# Patient Record
Sex: FEMALE | Race: WHITE | NOT HISPANIC OR LATINO | Employment: OTHER | URBAN - METROPOLITAN AREA
[De-identification: names, ages, dates, MRNs, and addresses within clinical notes are randomized per-mention and may not be internally consistent; named-entity substitution may affect disease eponyms.]

---

## 2017-03-07 ENCOUNTER — TELEPHONE (OUTPATIENT)
Dept: ONCOLOGY | Facility: HOSPITAL | Age: 77
End: 2017-03-07

## 2017-03-07 DIAGNOSIS — Z12.31 ENCOUNTER FOR MAMMOGRAM TO ESTABLISH BASELINE MAMMOGRAM: ICD-10-CM

## 2017-03-07 DIAGNOSIS — Z79.899 ENCOUNTER FOR LONG-TERM (CURRENT) USE OF MEDICATIONS: ICD-10-CM

## 2017-03-07 DIAGNOSIS — Z79.811 USE OF AROMATASE INHIBITORS: ICD-10-CM

## 2017-03-07 DIAGNOSIS — Z51.81 ENCOUNTER FOR THERAPEUTIC DRUG MONITORING: Primary | ICD-10-CM

## 2017-03-07 DIAGNOSIS — Z85.3 HISTORY OF BREAST CANCER: ICD-10-CM

## 2017-03-07 DIAGNOSIS — M85.9 DISORDER OF BONE DENSITY AND STRUCTURE, UNSPECIFIED: ICD-10-CM

## 2017-03-07 NOTE — TELEPHONE ENCOUNTER
----- Message from Yaneth David sent at 3/7/2017 12:10 PM EST -----  Contact: [phone]   Pt needs mammo and bone density ordered .

## 2017-03-07 NOTE — TELEPHONE ENCOUNTER
Pt would like a bone density scan and mammogram. Pt had last mammogram in May of 2016 and last bone density in May of 2015. D/W Dr. Agustin. OK to order both of these. Orders placed and message sent to scheduling.

## 2017-04-11 ENCOUNTER — APPOINTMENT (OUTPATIENT)
Dept: ONCOLOGY | Facility: CLINIC | Age: 77
End: 2017-04-11

## 2017-04-11 ENCOUNTER — APPOINTMENT (OUTPATIENT)
Dept: LAB | Facility: HOSPITAL | Age: 77
End: 2017-04-11

## 2017-05-23 ENCOUNTER — APPOINTMENT (OUTPATIENT)
Dept: WOMENS IMAGING | Facility: HOSPITAL | Age: 77
End: 2017-05-23

## 2017-05-23 PROCEDURE — 77063 BREAST TOMOSYNTHESIS BI: CPT | Performed by: RADIOLOGY

## 2017-05-23 PROCEDURE — 77080 DXA BONE DENSITY AXIAL: CPT | Performed by: RADIOLOGY

## 2017-05-23 PROCEDURE — G0202 SCR MAMMO BI INCL CAD: HCPCS | Performed by: RADIOLOGY

## 2017-05-30 ENCOUNTER — LAB (OUTPATIENT)
Dept: LAB | Facility: HOSPITAL | Age: 77
End: 2017-05-30

## 2017-05-30 ENCOUNTER — OFFICE VISIT (OUTPATIENT)
Dept: ONCOLOGY | Facility: CLINIC | Age: 77
End: 2017-05-30

## 2017-05-30 VITALS
BODY MASS INDEX: 41.25 KG/M2 | RESPIRATION RATE: 16 BRPM | HEIGHT: 65 IN | TEMPERATURE: 98.2 F | HEART RATE: 73 BPM | SYSTOLIC BLOOD PRESSURE: 178 MMHG | DIASTOLIC BLOOD PRESSURE: 84 MMHG | WEIGHT: 247.6 LBS | OXYGEN SATURATION: 94 %

## 2017-05-30 DIAGNOSIS — C50.112 MALIGNANT NEOPLASM OF CENTRAL PORTION OF LEFT FEMALE BREAST (HCC): Primary | ICD-10-CM

## 2017-05-30 DIAGNOSIS — C50.112 MALIGNANT NEOPLASM OF CENTRAL PORTION OF LEFT FEMALE BREAST (HCC): ICD-10-CM

## 2017-05-30 DIAGNOSIS — I82.502 CHRONIC DEEP VEIN THROMBOSIS (DVT) OF LEFT LOWER EXTREMITY, UNSPECIFIED VEIN (HCC): ICD-10-CM

## 2017-05-30 DIAGNOSIS — I82.502 CHRONIC DEEP VEIN THROMBOSIS (DVT) OF LEFT LOWER EXTREMITY, UNSPECIFIED VEIN (HCC): Primary | ICD-10-CM

## 2017-05-30 LAB
ALBUMIN SERPL-MCNC: 4.2 G/DL (ref 3.5–5.2)
ALBUMIN/GLOB SERPL: 1.3 G/DL (ref 1.1–2.4)
ALP SERPL-CCNC: 107 U/L (ref 38–116)
ALT SERPL W P-5'-P-CCNC: 15 U/L (ref 0–33)
ANION GAP SERPL CALCULATED.3IONS-SCNC: 11.1 MMOL/L
AST SERPL-CCNC: 17 U/L (ref 0–32)
BASOPHILS # BLD AUTO: 0.06 10*3/MM3 (ref 0–0.1)
BASOPHILS NFR BLD AUTO: 0.9 % (ref 0–1.1)
BILIRUB SERPL-MCNC: 0.4 MG/DL (ref 0.1–1.2)
BUN BLD-MCNC: 15 MG/DL (ref 6–20)
BUN/CREAT SERPL: 14.2 (ref 7.3–30)
CALCIUM SPEC-SCNC: 9.8 MG/DL (ref 8.5–10.2)
CHLORIDE SERPL-SCNC: 100 MMOL/L (ref 98–107)
CO2 SERPL-SCNC: 28.9 MMOL/L (ref 22–29)
CREAT BLD-MCNC: 1.06 MG/DL (ref 0.6–1.1)
DEPRECATED RDW RBC AUTO: 47.8 FL (ref 37–49)
EOSINOPHIL # BLD AUTO: 0.11 10*3/MM3 (ref 0–0.36)
EOSINOPHIL NFR BLD AUTO: 1.7 % (ref 1–5)
ERYTHROCYTE [DISTWIDTH] IN BLOOD BY AUTOMATED COUNT: 13.3 % (ref 11.7–14.5)
GFR SERPL CREATININE-BSD FRML MDRD: 51 ML/MIN/1.73
GLOBULIN UR ELPH-MCNC: 3.3 GM/DL (ref 1.8–3.5)
GLUCOSE BLD-MCNC: 102 MG/DL (ref 74–124)
HCT VFR BLD AUTO: 40.6 % (ref 34–45)
HGB BLD-MCNC: 12.9 G/DL (ref 11.5–14.9)
HOLD SPECIMEN: NORMAL
IMM GRANULOCYTES # BLD: 0.01 10*3/MM3 (ref 0–0.03)
IMM GRANULOCYTES NFR BLD: 0.2 % (ref 0–0.5)
LYMPHOCYTES # BLD AUTO: 1.98 10*3/MM3 (ref 1–3.5)
LYMPHOCYTES NFR BLD AUTO: 31 % (ref 20–49)
MCH RBC QN AUTO: 30.7 PG (ref 27–33)
MCHC RBC AUTO-ENTMCNC: 31.8 G/DL (ref 32–35)
MCV RBC AUTO: 96.7 FL (ref 83–97)
MONOCYTES # BLD AUTO: 0.61 10*3/MM3 (ref 0.25–0.8)
MONOCYTES NFR BLD AUTO: 9.6 % (ref 4–12)
NEUTROPHILS # BLD AUTO: 3.61 10*3/MM3 (ref 1.5–7)
NEUTROPHILS NFR BLD AUTO: 56.6 % (ref 39–75)
NRBC BLD MANUAL-RTO: 0 /100 WBC (ref 0–0)
PLATELET # BLD AUTO: 290 10*3/MM3 (ref 150–375)
PMV BLD AUTO: 9.5 FL (ref 8.9–12.1)
POTASSIUM BLD-SCNC: 4.7 MMOL/L (ref 3.5–4.7)
PROT SERPL-MCNC: 7.5 G/DL (ref 6.3–8)
RBC # BLD AUTO: 4.2 10*6/MM3 (ref 3.9–5)
SODIUM BLD-SCNC: 140 MMOL/L (ref 134–145)
WBC NRBC COR # BLD: 6.38 10*3/MM3 (ref 4–10)

## 2017-05-30 PROCEDURE — 80053 COMPREHEN METABOLIC PANEL: CPT

## 2017-05-30 PROCEDURE — 99213 OFFICE O/P EST LOW 20 MIN: CPT | Performed by: INTERNAL MEDICINE

## 2017-05-30 PROCEDURE — 85025 COMPLETE CBC W/AUTO DIFF WBC: CPT

## 2017-05-30 PROCEDURE — 36415 COLL VENOUS BLD VENIPUNCTURE: CPT

## 2017-07-18 RX ORDER — ANASTROZOLE 1 MG/1
TABLET ORAL
Qty: 90 TABLET | Refills: 0 | Status: SHIPPED | OUTPATIENT
Start: 2017-07-18 | End: 2018-01-05 | Stop reason: SDUPTHER

## 2017-10-16 RX ORDER — ANASTROZOLE 1 MG/1
TABLET ORAL
Qty: 90 TABLET | Refills: 0 | Status: SHIPPED | OUTPATIENT
Start: 2017-10-16 | End: 2018-01-05 | Stop reason: SDUPTHER

## 2018-01-05 ENCOUNTER — OFFICE VISIT (OUTPATIENT)
Dept: ONCOLOGY | Facility: CLINIC | Age: 78
End: 2018-01-05

## 2018-01-05 ENCOUNTER — LAB (OUTPATIENT)
Dept: LAB | Facility: HOSPITAL | Age: 78
End: 2018-01-05

## 2018-01-05 VITALS
OXYGEN SATURATION: 96 % | RESPIRATION RATE: 16 BRPM | SYSTOLIC BLOOD PRESSURE: 140 MMHG | HEIGHT: 66 IN | BODY MASS INDEX: 39.66 KG/M2 | HEART RATE: 63 BPM | TEMPERATURE: 98.1 F | WEIGHT: 246.8 LBS | DIASTOLIC BLOOD PRESSURE: 78 MMHG

## 2018-01-05 DIAGNOSIS — I82.502 CHRONIC DEEP VEIN THROMBOSIS (DVT) OF LEFT LOWER EXTREMITY, UNSPECIFIED VEIN (HCC): ICD-10-CM

## 2018-01-05 DIAGNOSIS — C50.112 MALIGNANT NEOPLASM OF CENTRAL PORTION OF LEFT FEMALE BREAST (HCC): ICD-10-CM

## 2018-01-05 DIAGNOSIS — C50.112 MALIGNANT NEOPLASM OF CENTRAL PORTION OF LEFT BREAST IN FEMALE, ESTROGEN RECEPTOR POSITIVE (HCC): Primary | ICD-10-CM

## 2018-01-05 DIAGNOSIS — Z17.0 MALIGNANT NEOPLASM OF CENTRAL PORTION OF LEFT BREAST IN FEMALE, ESTROGEN RECEPTOR POSITIVE (HCC): Primary | ICD-10-CM

## 2018-01-05 LAB
ALBUMIN SERPL-MCNC: 4.3 G/DL (ref 3.5–5.2)
ALBUMIN/GLOB SERPL: 1.3 G/DL (ref 1.1–2.4)
ALP SERPL-CCNC: 119 U/L (ref 38–116)
ALT SERPL W P-5'-P-CCNC: 14 U/L (ref 0–33)
ANION GAP SERPL CALCULATED.3IONS-SCNC: 10.4 MMOL/L
AST SERPL-CCNC: 19 U/L (ref 0–32)
BASOPHILS # BLD AUTO: 0.05 10*3/MM3 (ref 0–0.1)
BASOPHILS NFR BLD AUTO: 0.7 % (ref 0–1.1)
BILIRUB SERPL-MCNC: 0.5 MG/DL (ref 0.1–1.2)
BUN BLD-MCNC: 20 MG/DL (ref 6–20)
BUN/CREAT SERPL: 17.9 (ref 7.3–30)
CALCIUM SPEC-SCNC: 9.9 MG/DL (ref 8.5–10.2)
CHLORIDE SERPL-SCNC: 103 MMOL/L (ref 98–107)
CO2 SERPL-SCNC: 28.6 MMOL/L (ref 22–29)
CREAT BLD-MCNC: 1.12 MG/DL (ref 0.6–1.1)
DEPRECATED RDW RBC AUTO: 45.1 FL (ref 37–49)
EOSINOPHIL # BLD AUTO: 0.11 10*3/MM3 (ref 0–0.36)
EOSINOPHIL NFR BLD AUTO: 1.6 % (ref 1–5)
ERYTHROCYTE [DISTWIDTH] IN BLOOD BY AUTOMATED COUNT: 12.8 % (ref 11.7–14.5)
GFR SERPL CREATININE-BSD FRML MDRD: 47 ML/MIN/1.73
GLOBULIN UR ELPH-MCNC: 3.3 GM/DL (ref 1.8–3.5)
GLUCOSE BLD-MCNC: 109 MG/DL (ref 74–124)
HCT VFR BLD AUTO: 41.9 % (ref 34–45)
HGB BLD-MCNC: 13.1 G/DL (ref 11.5–14.9)
IMM GRANULOCYTES # BLD: 0.01 10*3/MM3 (ref 0–0.03)
IMM GRANULOCYTES NFR BLD: 0.1 % (ref 0–0.5)
LYMPHOCYTES # BLD AUTO: 1.72 10*3/MM3 (ref 1–3.5)
LYMPHOCYTES NFR BLD AUTO: 25.1 % (ref 20–49)
MCH RBC QN AUTO: 29.9 PG (ref 27–33)
MCHC RBC AUTO-ENTMCNC: 31.3 G/DL (ref 32–35)
MCV RBC AUTO: 95.7 FL (ref 83–97)
MONOCYTES # BLD AUTO: 0.66 10*3/MM3 (ref 0.25–0.8)
MONOCYTES NFR BLD AUTO: 9.6 % (ref 4–12)
NEUTROPHILS # BLD AUTO: 4.29 10*3/MM3 (ref 1.5–7)
NEUTROPHILS NFR BLD AUTO: 62.9 % (ref 39–75)
NRBC BLD MANUAL-RTO: 0 /100 WBC (ref 0–0)
PLATELET # BLD AUTO: 283 10*3/MM3 (ref 150–375)
PMV BLD AUTO: 9.5 FL (ref 8.9–12.1)
POTASSIUM BLD-SCNC: 5.4 MMOL/L (ref 3.5–4.7)
PROT SERPL-MCNC: 7.6 G/DL (ref 6.3–8)
RBC # BLD AUTO: 4.38 10*6/MM3 (ref 3.9–5)
SODIUM BLD-SCNC: 142 MMOL/L (ref 134–145)
WBC NRBC COR # BLD: 6.84 10*3/MM3 (ref 4–10)

## 2018-01-05 PROCEDURE — 80053 COMPREHEN METABOLIC PANEL: CPT | Performed by: INTERNAL MEDICINE

## 2018-01-05 PROCEDURE — 85025 COMPLETE CBC W/AUTO DIFF WBC: CPT | Performed by: INTERNAL MEDICINE

## 2018-01-05 PROCEDURE — 99213 OFFICE O/P EST LOW 20 MIN: CPT | Performed by: INTERNAL MEDICINE

## 2018-01-05 PROCEDURE — 36415 COLL VENOUS BLD VENIPUNCTURE: CPT | Performed by: INTERNAL MEDICINE

## 2018-01-05 RX ORDER — EZETIMIBE 10 MG/1
10 TABLET ORAL DAILY
COMMUNITY

## 2018-01-05 RX ORDER — ANASTROZOLE 1 MG/1
TABLET ORAL
Qty: 90 TABLET | Refills: 0 | Status: SHIPPED | OUTPATIENT
Start: 2018-01-05 | End: 2018-04-10 | Stop reason: SDUPTHER

## 2018-01-05 NOTE — PROGRESS NOTES
Subjective       REASONS FOR FOLLOWUP:   1. Extensive deep vein thrombosis of the left lower extremity following knee surgery.   2. Progression of clot in spite of Coumadin therapy. The patient is not felt to be a Coumadin failure but may have been subtherapeutic.   3. Lupus inhibitor identified on thrombophilia evaluation.   4. IVC filter placed by Dr. Perry Rosario September 2012.   5. Resolution of lupus anticoagulant 12/12.   6. Persistent active clot in February 2013, switched to Xarelto lifelong.   7. An 8 mm grade 1 lobular carcinoma, on Arimidex for 5 years. Starting 7/2014  8. Firm plaque-like area in the left breast. Mammographically negative in May 2015.     History of Present Illness  patient is 76-year-old lady with a hypercoagulable state on lifelong anticoagulation and a small lobular breast cancer the right for which she has been on Arimidex for 3.5  years.  She is tolerating Arimidex well has no new complaints.  She is continues her Xarelto without any bleeding complications.  Her leg swelling is under control with support hose.  .  Bone density in 3/17 shows some small improvement in the hips with normal density in the spine and there is no reason to switch off the Arimidex for the time being.  Mammogram in March was also unchanged and benign.  She does complain of a lot of aching in her legs and this may be related to the Arimidex but I don't think we will need to do anything although one month treatment holiday may not be unreasonable    PAST MEDICAL HISTORY:   1.Hypertension.   2.Hyperlipidemia.   3.Obesity.   PAST SURGICAL HISTORY:   1. Hysterectomy.   2. Left knee replacement surgery in July 2012 with Dr. Wilfredo Arambula.   HEMATOLOGIC/ONCOLOGIC HISTORY: History from previous dates can be found in the separate document.     SOCIAL HISTORY: The patient is  and lives with her  in [place], [place]. She is not a smoker and does not drink alcohol.   FAMILY HISTORY: No significant family  "history of clotting disorders.     Review of Systems   A comprehensive 14 point review of systems was performed and was negative except as mentioned.    Current Outpatient Prescriptions on File Prior to Visit   Medication Sig Dispense Refill   • amLODIPine (NORVASC) 10 MG tablet Take  by mouth daily.     • anastrozole (ARIMIDEX) 1 MG tablet TAKE 1 TABLET BY MOUTH EVERY DAY 90 tablet 0   • atorvastatin (LIPITOR) 40 MG tablet Take 80 mg by mouth.     • Calcium Carb-Cholecalciferol 600-200 MG-UNIT tablet Take  by mouth daily.     • ferrous sulfate 325 (65 FE) MG EC tablet Take  by mouth.     • metoprolol succinate XL (TOPROL-XL) 100 MG 24 hr tablet Take  by mouth daily.     • rivaroxaban (XARELTO) 20 MG tablet Take  by mouth.     • [DISCONTINUED] anastrozole (ARIMIDEX) 1 MG tablet TAKE 1 TABLET BY MOUTH EVERY DAY 90 tablet 0     No current facility-administered medications on file prior to visit.          ALLERGIES:    Allergies   Allergen Reactions   • No Known Drug Allergy        Objective      Vitals:    01/05/18 1139   BP: 140/78   Pulse: 63   Resp: 16   Temp: 98.1 °F (36.7 °C)   TempSrc: Oral   SpO2: 96%   Weight: 112 kg (246 lb 12.8 oz)   Height: 166.7 cm (65.63\")   PainSc: 0-No pain     Current Status 1/5/2018   ECOG score 0       Physical Exam   Pulmonary/Chest:           GENERAL:  Well-developed, well-nourished in no acute distress.   SKIN:  Warm, dry without rashes, purpura or petechiae.  HEAD:  Normocephalic.  EYES:  Pupils equal, round and reactive to light.  EOMs intact.  Conjunctivae normal.  EARS:  Hearing intact.  NOSE:  Septum midline.  No excoriations or nasal discharge.  MOUTH:  Tongue is well-papillated; no stomatitis or ulcers.  Lips normal.  THROAT:  Oropharynx without lesions or exudates.  NECK:  Supple with good range of motion; no thyromegaly or masses, no JVD.  LYMPHATICS:  No cervical, supraclavicular, axillary or inguinal adenopathy.  CHEST:  Lungs clear to percussion and auscultation. Good " airflow.  BREASTS: Right breast is benign left breast shows a  thickened area above the areola that is probably a calcified hematoma and stable  CARDIAC:  Regular rate and rhythm without murmurs, rubs or gallops. Normal S1,S2.  ABDOMEN:  Soft, nontender with no organomegaly or masses.  EXTREMITIES:  No clubbing, cyanosis or edema.  NEUROLOGICAL:  Cranial Nerves II-XII grossly intact.  No focal neurological deficits.  PSYCHIATRIC:  Normal affect and mood.        RECENT LABS:  Hematology WBC   Date Value Ref Range Status   01/05/2018 6.84 4.00 - 10.00 10*3/mm3 Final     RBC   Date Value Ref Range Status   01/05/2018 4.38 3.90 - 5.00 10*6/mm3 Final     Hemoglobin   Date Value Ref Range Status   01/05/2018 13.1 11.5 - 14.9 g/dL Final     Hematocrit   Date Value Ref Range Status   01/05/2018 41.9 34.0 - 45.0 % Final     MCV   Date Value Ref Range Status   01/05/2018 95.7 83.0 - 97.0 fL Final     MCH   Date Value Ref Range Status   01/05/2018 29.9 27.0 - 33.0 pg Final     MCHC   Date Value Ref Range Status   01/05/2018 31.3 (L) 32.0 - 35.0 g/dL Final     RDW   Date Value Ref Range Status   01/05/2018 12.8 11.7 - 14.5 % Final     RDW-SD   Date Value Ref Range Status   01/05/2018 45.1 37.0 - 49.0 fl Final     MPV   Date Value Ref Range Status   01/05/2018 9.5 8.9 - 12.1 fL Final     Platelets   Date Value Ref Range Status   01/05/2018 283 150 - 375 10*3/mm3 Final     Neutrophil %   Date Value Ref Range Status   01/05/2018 62.9 39.0 - 75.0 % Final     Lymphocyte %   Date Value Ref Range Status   01/05/2018 25.1 20.0 - 49.0 % Final     Monocyte %   Date Value Ref Range Status   01/05/2018 9.6 4.0 - 12.0 % Final     Eosinophil %   Date Value Ref Range Status   01/05/2018 1.6 1.0 - 5.0 % Final     Basophil %   Date Value Ref Range Status   01/05/2018 0.7 0.0 - 1.1 % Final     Immature Grans %   Date Value Ref Range Status   01/05/2018 0.1 0.0 - 0.5 % Final     Neutrophils, Absolute   Date Value Ref Range Status   01/05/2018  4.29 1.50 - 7.00 10*3/mm3 Final     Lymphocytes, Absolute   Date Value Ref Range Status   01/05/2018 1.72 1.00 - 3.50 10*3/mm3 Final     Monocytes, Absolute   Date Value Ref Range Status   01/05/2018 0.66 0.25 - 0.80 10*3/mm3 Final     Eosinophils, Absolute   Date Value Ref Range Status   01/05/2018 0.11 0.00 - 0.36 10*3/mm3 Final     Basophils, Absolute   Date Value Ref Range Status   01/05/2018 0.05 0.00 - 0.10 10*3/mm3 Final     Immature Grans, Absolute   Date Value Ref Range Status   01/05/2018 0.01 0.00 - 0.03 10*3/mm3 Final     nRBC   Date Value Ref Range Status   01/05/2018 0.0 0.0 - 0.0 /100 WBC Final       Bone density essentially stable           Assessment/Plan   1.Extensive deep vein thrombosis of the left lower extremity following knee surgery.   2.Progression of clot in spite of Coumadin therapy. The patient is not felt to be a Coumadin failure but may have been subtherapeutic.   3.Lupus inhibitor identified on thrombophilia evaluation.   4.IVC filter placed by Dr. Perry Rosario September 2012.   5. Resolution of lupus anticoagulant 12/12.   6.Persistent active clot in February 2013, switched to Xarelto lifelong.   7.An 8 mm grade 1 lobular carcinoma, on Arimidex for 5 years. Starting 7/2014                  8.Firm plaque-like area in the left breast. Mammographically negative in May 2015      Plan  Continue Arimidex and Xarelto  Return in 6 months for follow-up with labs  Mammogram is due in March 1/5/2018      CC:

## 2018-04-10 RX ORDER — ANASTROZOLE 1 MG/1
TABLET ORAL
Qty: 90 TABLET | Refills: 0 | Status: SHIPPED | OUTPATIENT
Start: 2018-04-10 | End: 2018-07-09 | Stop reason: SDUPTHER

## 2018-06-18 ENCOUNTER — APPOINTMENT (OUTPATIENT)
Dept: WOMENS IMAGING | Facility: HOSPITAL | Age: 78
End: 2018-06-18

## 2018-06-18 PROCEDURE — 77063 BREAST TOMOSYNTHESIS BI: CPT | Performed by: RADIOLOGY

## 2018-06-18 PROCEDURE — 77067 SCR MAMMO BI INCL CAD: CPT | Performed by: RADIOLOGY

## 2018-07-09 RX ORDER — ANASTROZOLE 1 MG/1
TABLET ORAL
Qty: 90 TABLET | Refills: 0 | Status: SHIPPED | OUTPATIENT
Start: 2018-07-09 | End: 2018-10-05 | Stop reason: SDUPTHER

## 2018-07-19 ENCOUNTER — LAB (OUTPATIENT)
Dept: LAB | Facility: HOSPITAL | Age: 78
End: 2018-07-19

## 2018-07-19 ENCOUNTER — OFFICE VISIT (OUTPATIENT)
Dept: ONCOLOGY | Facility: CLINIC | Age: 78
End: 2018-07-19

## 2018-07-19 VITALS
SYSTOLIC BLOOD PRESSURE: 142 MMHG | RESPIRATION RATE: 16 BRPM | HEART RATE: 68 BPM | DIASTOLIC BLOOD PRESSURE: 80 MMHG | HEIGHT: 65 IN | TEMPERATURE: 98.2 F | WEIGHT: 246.6 LBS | OXYGEN SATURATION: 92 % | BODY MASS INDEX: 41.09 KG/M2

## 2018-07-19 DIAGNOSIS — C50.112 MALIGNANT NEOPLASM OF CENTRAL PORTION OF LEFT BREAST IN FEMALE, ESTROGEN RECEPTOR POSITIVE (HCC): ICD-10-CM

## 2018-07-19 DIAGNOSIS — Z17.0 MALIGNANT NEOPLASM OF CENTRAL PORTION OF LEFT BREAST IN FEMALE, ESTROGEN RECEPTOR POSITIVE (HCC): ICD-10-CM

## 2018-07-19 DIAGNOSIS — I82.502 CHRONIC DEEP VEIN THROMBOSIS (DVT) OF LEFT LOWER EXTREMITY, UNSPECIFIED VEIN (HCC): ICD-10-CM

## 2018-07-19 DIAGNOSIS — I82.502 CHRONIC DEEP VEIN THROMBOSIS (DVT) OF LEFT LOWER EXTREMITY, UNSPECIFIED VEIN (HCC): Primary | ICD-10-CM

## 2018-07-19 LAB
ALBUMIN SERPL-MCNC: 4.2 G/DL (ref 3.5–5.2)
ALBUMIN/GLOB SERPL: 1.4 G/DL (ref 1.1–2.4)
ALP SERPL-CCNC: 96 U/L (ref 38–116)
ALT SERPL W P-5'-P-CCNC: 10 U/L (ref 0–33)
ANION GAP SERPL CALCULATED.3IONS-SCNC: 11.9 MMOL/L
AST SERPL-CCNC: 15 U/L (ref 0–32)
BASOPHILS # BLD AUTO: 0.05 10*3/MM3 (ref 0–0.1)
BASOPHILS NFR BLD AUTO: 0.8 % (ref 0–1.1)
BILIRUB SERPL-MCNC: 0.5 MG/DL (ref 0.1–1.2)
BUN BLD-MCNC: 16 MG/DL (ref 6–20)
BUN/CREAT SERPL: 15.7 (ref 7.3–30)
CALCIUM SPEC-SCNC: 9.7 MG/DL (ref 8.5–10.2)
CHLORIDE SERPL-SCNC: 104 MMOL/L (ref 98–107)
CO2 SERPL-SCNC: 27.1 MMOL/L (ref 22–29)
CREAT BLD-MCNC: 1.02 MG/DL (ref 0.6–1.1)
DEPRECATED RDW RBC AUTO: 48.6 FL (ref 37–49)
EOSINOPHIL # BLD AUTO: 0.12 10*3/MM3 (ref 0–0.36)
EOSINOPHIL NFR BLD AUTO: 1.9 % (ref 1–5)
ERYTHROCYTE [DISTWIDTH] IN BLOOD BY AUTOMATED COUNT: 13.8 % (ref 11.7–14.5)
GFR SERPL CREATININE-BSD FRML MDRD: 53 ML/MIN/1.73
GLOBULIN UR ELPH-MCNC: 3 GM/DL (ref 1.8–3.5)
GLUCOSE BLD-MCNC: 102 MG/DL (ref 74–124)
HCT VFR BLD AUTO: 39.2 % (ref 34–45)
HGB BLD-MCNC: 12.3 G/DL (ref 11.5–14.9)
IMM GRANULOCYTES # BLD: 0.02 10*3/MM3 (ref 0–0.03)
IMM GRANULOCYTES NFR BLD: 0.3 % (ref 0–0.5)
LYMPHOCYTES # BLD AUTO: 1.71 10*3/MM3 (ref 1–3.5)
LYMPHOCYTES NFR BLD AUTO: 27.2 % (ref 20–49)
MCH RBC QN AUTO: 30 PG (ref 27–33)
MCHC RBC AUTO-ENTMCNC: 31.4 G/DL (ref 32–35)
MCV RBC AUTO: 95.6 FL (ref 83–97)
MONOCYTES # BLD AUTO: 0.7 10*3/MM3 (ref 0.25–0.8)
MONOCYTES NFR BLD AUTO: 11.1 % (ref 4–12)
NEUTROPHILS # BLD AUTO: 3.69 10*3/MM3 (ref 1.5–7)
NEUTROPHILS NFR BLD AUTO: 58.7 % (ref 39–75)
NRBC BLD MANUAL-RTO: 0 /100 WBC (ref 0–0)
PLATELET # BLD AUTO: 271 10*3/MM3 (ref 150–375)
PMV BLD AUTO: 9.8 FL (ref 8.9–12.1)
POTASSIUM BLD-SCNC: 4.6 MMOL/L (ref 3.5–4.7)
PROT SERPL-MCNC: 7.2 G/DL (ref 6.3–8)
RBC # BLD AUTO: 4.1 10*6/MM3 (ref 3.9–5)
SODIUM BLD-SCNC: 143 MMOL/L (ref 134–145)
WBC NRBC COR # BLD: 6.29 10*3/MM3 (ref 4–10)

## 2018-07-19 PROCEDURE — 36415 COLL VENOUS BLD VENIPUNCTURE: CPT | Performed by: INTERNAL MEDICINE

## 2018-07-19 PROCEDURE — 85025 COMPLETE CBC W/AUTO DIFF WBC: CPT | Performed by: INTERNAL MEDICINE

## 2018-07-19 PROCEDURE — 99213 OFFICE O/P EST LOW 20 MIN: CPT | Performed by: INTERNAL MEDICINE

## 2018-07-19 PROCEDURE — 80053 COMPREHEN METABOLIC PANEL: CPT | Performed by: INTERNAL MEDICINE

## 2018-07-19 NOTE — PROGRESS NOTES
Subjective       REASONS FOR FOLLOWUP:   1. Extensive deep vein thrombosis of the left lower extremity following knee surgery.   2. Progression of clot in spite of Coumadin therapy. The patient is not felt to be a Coumadin failure but may have been subtherapeutic.   3. Lupus inhibitor identified on thrombophilia evaluation.   4. IVC filter placed by Dr. Perry Rosario September 2012.   5. Resolution of lupus anticoagulant 12/12.   6. Persistent active clot in February 2013, switched to Xarelto lifelong.   7. An 8 mm grade 1 lobular carcinoma, on Arimidex for 5 years. Starting 7/2014  8. Firm plaque-like area in the left breast. Mammographically negative in May 2015.     History of Present Illness  patient is 76-year-old lady with a hypercoagulable state on lifelong anticoagulation and a small lobular breast cancer the right for which she has been on Arimidex for 4  years.  She is tolerating Arimidex well has no new complaints.  She is continues her Xarelto without any bleeding complications.  Her leg swelling is under control with support hose.  .  Bone density in 3/17 shows some small improvement in the hips with normal density in the spine and there is no reason to switch off the Arimidex for the time being.      Mammogram on 6/8/18  was also unchanged and benign.        Active Ambulatory Problems     Diagnosis Date Noted   • Malignant neoplasm of central portion of left female breast (CMS/[facility]) 05/08/2016   • DVT (deep venous thrombosis) (CMS/[facility]) 05/08/2016     Resolved Ambulatory Problems     Diagnosis Date Noted   • No Resolved Ambulatory Problems     Past Medical History:   Diagnosis Date   • Arthritis    • Cancer (CMS/[facility]) 2014   • DVT (deep venous thrombosis) (CMS/[facility]) 2012   • Hyperlipidemia    • Hypertension    • Obesity    PAST SURGICAL HISTORY:   1. Hysterectomy.   2. Left knee replacement surgery in July 2012 with Dr. Wilfredo Arambula.   HEMATOLOGIC/ONCOLOGIC HISTORY:  The patient had undergone left total knee  replacement in July.  She unfortunately developed a DVT in the left lower extremity after discontinuing her Coumadin prophylaxis.  At that point she was started on Lovenox and resumed Coumadin.  When her Lovenox s  h  ots were discontinued, she developed some more pain and swelling in the leg and was found to have had some extension of clot in the left leg and was readmitted to [facility].  She underwent IVC filter placement with Dr. Perry Rosario and was starte  d   on IV heparin initially.  Coumadin was continued and we felt that she was not a definite Coumadin failure but that she may have been subtherapeutic when the clot extended.  Her thrombophilia workup did reveal the presence of a lupus inhibitor and for thi  s reason we elected to increase her INR goal to between 2.5 and 3.5.          She also was anemic in the hospital and has been taking oral iron supplementation with some improvement in her hemoglobin.  Repeat iron studies were drawn today but are pending.  Her IN  R in the office today is slightly too high at 3.6 and she will receive a slight Coumadin adjustment dropping from 27 mg of Coumadin weekly down to 24 mg Coumadin weekly (3 mg 5 days a week and 4.5 mg 2 days a week).  Since leaving the hospital she has bee  n faithfully wearing her stocking and is trying to keep the leg elevated and feels that there has been improvement.        Patient seen 10/5/12 doing well.  Lupus anticoagulant and  to be repeated 12 weeks after her original positive lupus anticoagulant.  Her   IVC filter is temporary but I doubt we can remove it at this point.   is probably elevated because of her history of rheumatoid arthritis.        Repeat testing in 12/12 shows resolution of lupus anticoagulant.   is only 1:40 positive.  Plan to repeat her Doppler and assess and consider stopping at six months and switching to aspirin.        The patient was seen on 3/11/3 clinically stable, but repeat Doppler in  February 2013 showed clot that looks active and therefore the patient was switched to Xarelto.          New breast cancer diagnosed mammographically in May 2014. Bridging by stopping her Xarelto 2 days before surgery, Lovenox 40 mg for 3 to 4 days postop and then resumption of Xarelto.      Plan and review her pathology reports in 3 weeks after surgery.              The patient was seen on 10/21/2014, having started Arimidex just 1 week ago. Tolerance so far is good.            Patient seen on 05/12/2015 with persistent induration involving the skin of the left breast  above and around the areola measuring 4 x 3 cm.  Ultrasound and mammogram ordered for followup.              Patient seen on 08/04/2015 doing well on Arimidex with thickening and induration. The plaque-like area in the left breast is unchanged with a negative mammogram and ultrasound in May.     SOCIAL HISTORY: The patient is  and lives with her  in [place], [place]. She is not a smoker and does not drink alcohol.   FAMILY HISTORY: No significant family history of clotting disorders.     Review of Systems   A comprehensive 14 point review of systems was performed and was negative except as mentioned.    Current Outpatient Prescriptions on File Prior to Visit   Medication Sig Dispense Refill   • amLODIPine (NORVASC) 10 MG tablet Take  by mouth daily.     • anastrozole (ARIMIDEX) 1 MG tablet TAKE 1 TABLET BY MOUTH DAILY 90 tablet 0   • atorvastatin (LIPITOR) 40 MG tablet Take 80 mg by mouth.     • Calcium Carb-Cholecalciferol 600-200 MG-UNIT tablet Take  by mouth daily.     • ezetimibe (ZETIA) 10 MG tablet Take 10 mg by mouth Daily.     • metoprolol succinate XL (TOPROL-XL) 100 MG 24 hr tablet Take  by mouth daily.     • rivaroxaban (XARELTO) 20 MG tablet Take  by mouth.       No current facility-administered medications on file prior to visit.          ALLERGIES:    Allergies   Allergen Reactions   • No Known Drug Allergy        Objective  "     Vitals:    07/19/18 1014   BP: 142/80   Pulse: 68   Resp: 16   Temp: 98.2 °F (36.8 °C)   TempSrc: Oral   SpO2: 92%   Weight: 112 kg (246 lb 9.6 oz)   Height: 165.1 cm (65\")  Comment: new height   PainSc: 0-No pain     Current Status 7/19/2018   ECOG score 0       Physical Exam   Pulmonary/Chest:           GENERAL:  Well-developed, well-nourished in no acute distress.   SKIN:  Warm, dry without rashes, purpura or petechiae.  HEAD:  Normocephalic.  EYES:  Pupils equal, round and reactive to light.  EOMs intact.  Conjunctivae normal.  EARS:  Hearing intact.  NOSE:  Septum midline.  No excoriations or nasal discharge.  MOUTH:  Tongue is well-papillated; no stomatitis or ulcers.  Lips normal.  THROAT:  Oropharynx without lesions or exudates.  NECK:  Supple with good range of motion; no thyromegaly or masses, no JVD.  LYMPHATICS:  No cervical, supraclavicular, axillary or inguinal adenopathy.  CHEST:  Lungs clear to percussion and auscultation. Good airflow.  BREASTS: Right breast is benign left breast shows a  thickened area above the areola that is probably a calcified hematoma and stable  CARDIAC:  Regular rate and rhythm without murmurs, rubs or gallops. Normal S1,S2.  ABDOMEN:  Soft, nontender with no organomegaly or masses.  EXTREMITIES:  No clubbing, cyanosis or edema.  NEUROLOGICAL:  Cranial Nerves II-XII grossly intact.  No focal neurological deficits.  PSYCHIATRIC:  Normal affect and mood.        RECENT LABS:  Hematology Lab Results   Component Value Date    WBC 6.29 07/19/2018    HGB 12.3 07/19/2018    HCT 39.2 07/19/2018    MCV 95.6 07/19/2018     07/19/2018         Bone density essentially stable           Assessment/Plan   1.Extensive deep vein thrombosis of the left lower extremity following knee surgery.   2.Progression of clot in spite of Coumadin therapy. The patient is not felt to be a Coumadin failure but may have been subtherapeutic.   3.Lupus inhibitor identified on thrombophilia " evaluation.   4.IVC filter placed by Dr. Perry Rosario September 2012.   5. Resolution of lupus anticoagulant 12/12.   6.Persistent active clot in February 2013, switched to Xarelto lifelong.   7.An 8 mm grade 1 lobular carcinoma, on Arimidex for 5 years. Starting 7/2014                  8.Firm plaque-like area in the left breast. Mammographically negative in May 2015      Plan  Continue Arimidex and Xarelto  Return in 6 months for follow-up with labs                7/19/2018      CC:

## 2018-10-05 RX ORDER — ANASTROZOLE 1 MG/1
TABLET ORAL
Qty: 90 TABLET | Refills: 0 | Status: SHIPPED | OUTPATIENT
Start: 2018-10-05 | End: 2019-01-11 | Stop reason: SDUPTHER

## 2019-01-14 RX ORDER — ANASTROZOLE 1 MG/1
TABLET ORAL
Qty: 90 TABLET | Refills: 0 | Status: SHIPPED | OUTPATIENT
Start: 2019-01-14 | End: 2019-04-10 | Stop reason: SDUPTHER

## 2019-01-18 ENCOUNTER — LAB (OUTPATIENT)
Dept: LAB | Facility: HOSPITAL | Age: 79
End: 2019-01-18

## 2019-01-18 ENCOUNTER — OFFICE VISIT (OUTPATIENT)
Dept: ONCOLOGY | Facility: CLINIC | Age: 79
End: 2019-01-18

## 2019-01-18 VITALS
HEART RATE: 66 BPM | BODY MASS INDEX: 40.57 KG/M2 | TEMPERATURE: 97.8 F | OXYGEN SATURATION: 95 % | WEIGHT: 243.5 LBS | HEIGHT: 65 IN | SYSTOLIC BLOOD PRESSURE: 146 MMHG | DIASTOLIC BLOOD PRESSURE: 83 MMHG | RESPIRATION RATE: 16 BRPM

## 2019-01-18 DIAGNOSIS — Z79.811 USE OF ANASTROZOLE (ARIMIDEX): ICD-10-CM

## 2019-01-18 DIAGNOSIS — C50.112 MALIGNANT NEOPLASM OF CENTRAL PORTION OF LEFT BREAST IN FEMALE, ESTROGEN RECEPTOR POSITIVE (HCC): ICD-10-CM

## 2019-01-18 DIAGNOSIS — Z17.0 MALIGNANT NEOPLASM OF CENTRAL PORTION OF LEFT BREAST IN FEMALE, ESTROGEN RECEPTOR POSITIVE (HCC): ICD-10-CM

## 2019-01-18 DIAGNOSIS — I82.502 CHRONIC DEEP VEIN THROMBOSIS (DVT) OF LEFT LOWER EXTREMITY, UNSPECIFIED VEIN (HCC): ICD-10-CM

## 2019-01-18 DIAGNOSIS — Z79.899 ENCOUNTER FOR LONG-TERM (CURRENT) USE OF HIGH-RISK MEDICATION: ICD-10-CM

## 2019-01-18 DIAGNOSIS — C50.112 MALIGNANT NEOPLASM OF CENTRAL PORTION OF LEFT BREAST IN FEMALE, ESTROGEN RECEPTOR POSITIVE (HCC): Primary | ICD-10-CM

## 2019-01-18 DIAGNOSIS — Z09 ENCOUNTER FOR FOLLOW-UP EXAMINATION AFTER COMPLETED TREATMENT FOR CONDITIONS OTHER THAN MALIGNANT NEOPLASM: ICD-10-CM

## 2019-01-18 DIAGNOSIS — Z17.0 MALIGNANT NEOPLASM OF CENTRAL PORTION OF LEFT BREAST IN FEMALE, ESTROGEN RECEPTOR POSITIVE (HCC): Primary | ICD-10-CM

## 2019-01-18 LAB
ALBUMIN SERPL-MCNC: 4.2 G/DL (ref 3.5–5.2)
ALBUMIN/GLOB SERPL: 1.4 G/DL (ref 1.1–2.4)
ALP SERPL-CCNC: 104 U/L (ref 38–116)
ALT SERPL W P-5'-P-CCNC: 14 U/L (ref 0–33)
ANION GAP SERPL CALCULATED.3IONS-SCNC: 13.4 MMOL/L
AST SERPL-CCNC: 17 U/L (ref 0–32)
BASOPHILS # BLD AUTO: 0.04 10*3/MM3 (ref 0–0.1)
BASOPHILS NFR BLD AUTO: 0.6 % (ref 0–1.1)
BILIRUB SERPL-MCNC: 0.4 MG/DL (ref 0.1–1.2)
BUN BLD-MCNC: 21 MG/DL (ref 6–20)
BUN/CREAT SERPL: 19.6 (ref 7.3–30)
CALCIUM SPEC-SCNC: 9.7 MG/DL (ref 8.5–10.2)
CHLORIDE SERPL-SCNC: 103 MMOL/L (ref 98–107)
CO2 SERPL-SCNC: 25.6 MMOL/L (ref 22–29)
CREAT BLD-MCNC: 1.07 MG/DL (ref 0.6–1.1)
DEPRECATED RDW RBC AUTO: 47 FL (ref 37–49)
EOSINOPHIL # BLD AUTO: 0.1 10*3/MM3 (ref 0–0.36)
EOSINOPHIL NFR BLD AUTO: 1.6 % (ref 1–5)
ERYTHROCYTE [DISTWIDTH] IN BLOOD BY AUTOMATED COUNT: 13.3 % (ref 11.7–14.5)
GFR SERPL CREATININE-BSD FRML MDRD: 50 ML/MIN/1.73
GLOBULIN UR ELPH-MCNC: 3 GM/DL (ref 1.8–3.5)
GLUCOSE BLD-MCNC: 117 MG/DL (ref 74–124)
HCT VFR BLD AUTO: 40 % (ref 34–45)
HGB BLD-MCNC: 12.6 G/DL (ref 11.5–14.9)
IMM GRANULOCYTES # BLD AUTO: 0.01 10*3/MM3 (ref 0–0.03)
IMM GRANULOCYTES NFR BLD AUTO: 0.2 % (ref 0–0.5)
LYMPHOCYTES # BLD AUTO: 1.68 10*3/MM3 (ref 1–3.5)
LYMPHOCYTES NFR BLD AUTO: 26.2 % (ref 20–49)
MCH RBC QN AUTO: 30.1 PG (ref 27–33)
MCHC RBC AUTO-ENTMCNC: 31.5 G/DL (ref 32–35)
MCV RBC AUTO: 95.7 FL (ref 83–97)
MONOCYTES # BLD AUTO: 0.66 10*3/MM3 (ref 0.25–0.8)
MONOCYTES NFR BLD AUTO: 10.3 % (ref 4–12)
NEUTROPHILS # BLD AUTO: 3.92 10*3/MM3 (ref 1.5–7)
NEUTROPHILS NFR BLD AUTO: 61.1 % (ref 39–75)
NRBC BLD AUTO-RTO: 0 /100 WBC (ref 0–0)
PLATELET # BLD AUTO: 272 10*3/MM3 (ref 150–375)
PMV BLD AUTO: 9.6 FL (ref 8.9–12.1)
POTASSIUM BLD-SCNC: 4.3 MMOL/L (ref 3.5–4.7)
PROT SERPL-MCNC: 7.2 G/DL (ref 6.3–8)
RBC # BLD AUTO: 4.18 10*6/MM3 (ref 3.9–5)
SODIUM BLD-SCNC: 142 MMOL/L (ref 134–145)
WBC NRBC COR # BLD: 6.41 10*3/MM3 (ref 4–10)

## 2019-01-18 PROCEDURE — 36415 COLL VENOUS BLD VENIPUNCTURE: CPT | Performed by: INTERNAL MEDICINE

## 2019-01-18 PROCEDURE — 99214 OFFICE O/P EST MOD 30 MIN: CPT | Performed by: INTERNAL MEDICINE

## 2019-01-18 PROCEDURE — 85025 COMPLETE CBC W/AUTO DIFF WBC: CPT | Performed by: INTERNAL MEDICINE

## 2019-01-18 PROCEDURE — 80053 COMPREHEN METABOLIC PANEL: CPT | Performed by: INTERNAL MEDICINE

## 2019-01-18 NOTE — PROGRESS NOTES
Subjective       REASONS FOR FOLLOWUP:   1. Extensive deep vein thrombosis of the left lower extremity following knee surgery.   2. Progression of clot in spite of Coumadin therapy. The patient is not felt to be a Coumadin failure but may have been subtherapeutic.   3. Lupus inhibitor identified on thrombophilia evaluation.   4. IVC filter placed by Dr. Perry Rosario September 2012.   5. Resolution of lupus anticoagulant 12/12.   6. Persistent active clot in February 2013, switched to Xarelto lifelong.   7. An 8 mm grade 1 lobular carcinoma, on Arimidex for 5 years. Starting 7/2014  8. Firm plaque-like area in the left breast. Mammographically negative in May 2015.     History of Present Illness  patient is 76-year-old lady with a hypercoagulable state on lifelong anticoagulation and a small lobular breast cancer the right for which she has been on Arimidex for 4.5  years.  She is tolerating Arimidex well has no new complaints.  She is continues her Xarelto without any bleeding complications.  Her leg swelling is under control with support hose.  .    Bone density in 3/17 shows some small improvement in the hips with normal density in the spine and there is no reason to switch off the Arimidex for the time being.      Mammogram on 6/8/18  was also unchanged and benign.      We discussed repeating your bone density when we see her back for her 5 year checkup in 6 months and if her bone density stable because of the lobular histology I would recommend 2 more years of aromatase inhibitor and she is agreeable      Active Ambulatory Problems     Diagnosis Date Noted   • Malignant neoplasm of central portion of left female breast (CMS/[facility]) 05/08/2016   • DVT (deep venous thrombosis) (CMS/[facility]) 05/08/2016     Resolved Ambulatory Problems     Diagnosis Date Noted   • No Resolved Ambulatory Problems     Past Medical History:   Diagnosis Date   • Arthritis    • Cancer (CMS/[facility]) 2014   • DVT (deep venous thrombosis) (CMS/[facility])  2012   • Hyperlipidemia    • Hypertension    • Obesity    PAST SURGICAL HISTORY:   1. Hysterectomy.   2. Left knee replacement surgery in July 2012 with Dr. Wilfredo Arambula.   HEMATOLOGIC/ONCOLOGIC HISTORY:  The patient had undergone left total knee replacement in July.  She unfortunately developed a DVT in the left lower extremity after discontinuing her Coumadin prophylaxis.  At that point she was started on Lovenox and resumed Coumadin.  When her Lovenox s  h  ots were discontinued, she developed some more pain and swelling in the leg and was found to have had some extension of clot in the left leg and was readmitted to [facility].  She underwent IVC filter placement with Dr. Perry Rosario and was starte  d   on IV heparin initially.  Coumadin was continued and we felt that she was not a definite Coumadin failure but that she may have been subtherapeutic when the clot extended.  Her thrombophilia workup did reveal the presence of a lupus inhibitor and for thi  s reason we elected to increase her INR goal to between 2.5 and 3.5.          She also was anemic in the hospital and has been taking oral iron supplementation with some improvement in her hemoglobin.  Repeat iron studies were drawn today but are pending.  Her IN  R in the office today is slightly too high at 3.6 and she will receive a slight Coumadin adjustment dropping from 27 mg of Coumadin weekly down to 24 mg Coumadin weekly (3 mg 5 days a week and 4.5 mg 2 days a week).  Since leaving the hospital she has bee  n faithfully wearing her stocking and is trying to keep the leg elevated and feels that there has been improvement.        Patient seen 10/5/12 doing well.  Lupus anticoagulant and  to be repeated 12 weeks after her original positive lupus anticoagulant.  Her   IVC filter is temporary but I doubt we can remove it at this point.   is probably elevated because of her history of rheumatoid arthritis.        Repeat testing in 12/12 shows  resolution of lupus anticoagulant.   is only 1:40 positive.  Plan to repeat her Doppler and assess and consider stopping at six months and switching to aspirin.        The patient was seen on 3/11/3 clinically stable, but repeat Doppler in February 2013 showed clot that looks active and therefore the patient was switched to Xarelto.          New breast cancer diagnosed mammographically in May 2014. Bridging by stopping her Xarelto 2 days before surgery, Lovenox 40 mg for 3 to 4 days postop and then resumption of Xarelto.      Plan and review her pathology reports in 3 weeks after surgery.              The patient was seen on 10/21/2014, having started Arimidex just 1 week ago. Tolerance so far is good.            Patient seen on 05/12/2015 with persistent induration involving the skin of the left breast  above and around the areola measuring 4 x 3 cm.  Ultrasound and mammogram ordered for followup.              Patient seen on 08/04/2015 doing well on Arimidex with thickening and induration. The plaque-like area in the left breast is unchanged with a negative mammogram and ultrasound in May.     SOCIAL HISTORY: The patient is  and lives with her  in [place] [address], [place]. She is not a smoker and does not drink alcohol.   FAMILY HISTORY: No significant family history of clotting disorders.     Review of Systems   Constitutional: Positive for fatigue (1/18/19).   Respiratory: Negative for chest tightness and shortness of breath.    Cardiovascular: Negative for chest pain.   Gastrointestinal: Negative for constipation, diarrhea, nausea and vomiting.   Genitourinary: Positive for frequency (1/18/19). Negative for difficulty urinating.   Neurological: Negative for dizziness and headaches.   Psychiatric/Behavioral: Negative for sleep disturbance.      A comprehensive 14 point review of systems was performed and was negative except as mentioned.    Current Outpatient Medications on File Prior to Visit  "  Medication Sig Dispense Refill   • amLODIPine (NORVASC) 10 MG tablet Take  by mouth daily.     • anastrozole (ARIMIDEX) 1 MG tablet TAKE 1 TABLET BY MOUTH DAILY 90 tablet 0   • atorvastatin (LIPITOR) 40 MG tablet Take 80 mg by mouth.     • Calcium Carb-Cholecalciferol 600-200 MG-UNIT tablet Take  by mouth daily.     • ezetimibe (ZETIA) 10 MG tablet Take 10 mg by mouth Daily.     • metoprolol succinate XL (TOPROL-XL) 100 MG 24 hr tablet Take  by mouth daily.     • rivaroxaban (XARELTO) 20 MG tablet Take  by mouth.       No current facility-administered medications on file prior to visit.          ALLERGIES:    Allergies   Allergen Reactions   • No Known Drug Allergy        Objective      Vitals:    01/18/19 1058   BP: 146/83   Pulse: 66   Resp: 16   Temp: 97.8 °F (36.6 °C)   SpO2: 95%   Weight: 110 kg (243 lb 8 oz)   Height: 165.1 cm (65\")   PainSc: 0-No pain     Current Status 1/18/2019   ECOG score 0       Physical Exam   Pulmonary/Chest:           GENERAL:  Well-developed, well-nourished in no acute distress.   SKIN:  Warm, dry without rashes, purpura or petechiae.  HEAD:  Normocephalic.  EYES:  Pupils equal, round and reactive to light.  EOMs intact.  Conjunctivae normal.  EARS:  Hearing intact.  NOSE:  Septum midline.  No excoriations or nasal discharge.  MOUTH:  Tongue is well-papillated; no stomatitis or ulcers.  Lips normal.  THROAT:  Oropharynx without lesions or exudates.  NECK:  Supple with good range of motion; no thyromegaly or masses, no JVD.  LYMPHATICS:  No cervical, supraclavicular, axillary or inguinal adenopathy.  CHEST:  Lungs clear to percussion and auscultation. Good airflow.  BREASTS: Right breast is benign left breast shows a  thickened area above the areola that is probably a calcified hematoma and stable  CARDIAC:  Regular rate and rhythm without murmurs, rubs or gallops. Normal S1,S2.  ABDOMEN:  Soft, nontender with no organomegaly or masses.  EXTREMITIES:  No clubbing, cyanosis or " edema.  NEUROLOGICAL:  Cranial Nerves II-XII grossly intact.  No focal neurological deficits.  PSYCHIATRIC:  Normal affect and mood.        RECENT LABS:  Hematology Lab Results   Component Value Date    WBC 6.41 01/18/2019    HGB 12.6 01/18/2019    HCT 40.0 01/18/2019    MCV 95.7 01/18/2019     01/18/2019         Bone density essentially stable           Assessment/Plan   1.Extensive deep vein thrombosis of the left lower extremity following knee surgery.   2.Progression of clot in spite of Coumadin therapy. The patient is not felt to be a Coumadin failure but may have been subtherapeutic.   3.Lupus inhibitor identified on thrombophilia evaluation.   4.IVC filter placed by Dr. Perry Rosario September 2012.   5. Resolution of lupus anticoagulant 12/12.   6.Persistent active clot in February 2013, switched to Xarelto lifelong.   7.An 8 mm grade 1 lobular carcinoma, on Arimidex for 5 years. Starting 7/2014                  8.Firm plaque-like area in the left breast. Mammographically negative in May 2015      Plan  Continue Arimidex and Xarelto  Return in 6 months for follow-up with labs and DEXA scan and mammogram    We discussed the rationale for extended hormonal therapy beyond 5 years and as long as her bone density is no worse she is agreeable                1/18/2019      CC:

## 2019-04-11 RX ORDER — ANASTROZOLE 1 MG/1
TABLET ORAL
Qty: 90 TABLET | Refills: 1 | Status: SHIPPED | OUTPATIENT
Start: 2019-04-11 | End: 2019-10-15 | Stop reason: SDUPTHER

## 2019-06-25 ENCOUNTER — APPOINTMENT (OUTPATIENT)
Dept: WOMENS IMAGING | Facility: HOSPITAL | Age: 79
End: 2019-06-25

## 2019-06-25 PROCEDURE — 77063 BREAST TOMOSYNTHESIS BI: CPT | Performed by: RADIOLOGY

## 2019-06-25 PROCEDURE — 77067 SCR MAMMO BI INCL CAD: CPT | Performed by: RADIOLOGY

## 2019-06-25 PROCEDURE — 77080 DXA BONE DENSITY AXIAL: CPT | Performed by: RADIOLOGY

## 2019-07-02 ENCOUNTER — LAB (OUTPATIENT)
Dept: LAB | Facility: HOSPITAL | Age: 79
End: 2019-07-02

## 2019-07-02 ENCOUNTER — OFFICE VISIT (OUTPATIENT)
Dept: ONCOLOGY | Facility: CLINIC | Age: 79
End: 2019-07-02

## 2019-07-02 VITALS
DIASTOLIC BLOOD PRESSURE: 94 MMHG | HEIGHT: 65 IN | BODY MASS INDEX: 40.54 KG/M2 | WEIGHT: 243.3 LBS | TEMPERATURE: 98 F | OXYGEN SATURATION: 97 % | SYSTOLIC BLOOD PRESSURE: 172 MMHG | HEART RATE: 68 BPM | RESPIRATION RATE: 16 BRPM

## 2019-07-02 DIAGNOSIS — Z17.0 MALIGNANT NEOPLASM OF CENTRAL PORTION OF LEFT BREAST IN FEMALE, ESTROGEN RECEPTOR POSITIVE (HCC): ICD-10-CM

## 2019-07-02 DIAGNOSIS — C50.112 MALIGNANT NEOPLASM OF CENTRAL PORTION OF LEFT BREAST IN FEMALE, ESTROGEN RECEPTOR POSITIVE (HCC): ICD-10-CM

## 2019-07-02 DIAGNOSIS — Z17.0 MALIGNANT NEOPLASM OF CENTRAL PORTION OF LEFT BREAST IN FEMALE, ESTROGEN RECEPTOR POSITIVE (HCC): Primary | ICD-10-CM

## 2019-07-02 DIAGNOSIS — M85.89 OSTEOPENIA OF MULTIPLE SITES: ICD-10-CM

## 2019-07-02 DIAGNOSIS — I82.502 CHRONIC DEEP VEIN THROMBOSIS (DVT) OF LEFT LOWER EXTREMITY, UNSPECIFIED VEIN (HCC): ICD-10-CM

## 2019-07-02 DIAGNOSIS — C50.112 MALIGNANT NEOPLASM OF CENTRAL PORTION OF LEFT BREAST IN FEMALE, ESTROGEN RECEPTOR POSITIVE (HCC): Primary | ICD-10-CM

## 2019-07-02 LAB
ALBUMIN SERPL-MCNC: 4.5 G/DL (ref 3.5–5.2)
ALBUMIN/GLOB SERPL: 1.5 G/DL (ref 1.1–2.4)
ALP SERPL-CCNC: 110 U/L (ref 38–116)
ALT SERPL W P-5'-P-CCNC: 13 U/L (ref 0–33)
ANION GAP SERPL CALCULATED.3IONS-SCNC: 13.1 MMOL/L (ref 5–15)
AST SERPL-CCNC: 16 U/L (ref 0–32)
BASOPHILS # BLD AUTO: 0.05 10*3/MM3 (ref 0–0.2)
BASOPHILS NFR BLD AUTO: 0.7 % (ref 0–1.5)
BILIRUB SERPL-MCNC: 0.5 MG/DL (ref 0.2–1.2)
BUN BLD-MCNC: 16 MG/DL (ref 6–20)
BUN/CREAT SERPL: 15.5 (ref 7.3–30)
CALCIUM SPEC-SCNC: 9.9 MG/DL (ref 8.5–10.2)
CHLORIDE SERPL-SCNC: 101 MMOL/L (ref 98–107)
CO2 SERPL-SCNC: 25.9 MMOL/L (ref 22–29)
CREAT BLD-MCNC: 1.03 MG/DL (ref 0.6–1.1)
DEPRECATED RDW RBC AUTO: 46.3 FL (ref 37–54)
EOSINOPHIL # BLD AUTO: 0.1 10*3/MM3 (ref 0–0.4)
EOSINOPHIL NFR BLD AUTO: 1.5 % (ref 0.3–6.2)
ERYTHROCYTE [DISTWIDTH] IN BLOOD BY AUTOMATED COUNT: 13.2 % (ref 12.3–15.4)
GFR SERPL CREATININE-BSD FRML MDRD: 52 ML/MIN/1.73
GLOBULIN UR ELPH-MCNC: 3.1 GM/DL (ref 1.8–3.5)
GLUCOSE BLD-MCNC: 97 MG/DL (ref 74–124)
HCT VFR BLD AUTO: 41.5 % (ref 34–46.6)
HGB BLD-MCNC: 13.1 G/DL (ref 12–15.9)
IMM GRANULOCYTES # BLD AUTO: 0.01 10*3/MM3 (ref 0–0.05)
IMM GRANULOCYTES NFR BLD AUTO: 0.1 % (ref 0–0.5)
LYMPHOCYTES # BLD AUTO: 2.09 10*3/MM3 (ref 0.7–3.1)
LYMPHOCYTES NFR BLD AUTO: 31.3 % (ref 19.6–45.3)
MCH RBC QN AUTO: 30.3 PG (ref 26.6–33)
MCHC RBC AUTO-ENTMCNC: 31.6 G/DL (ref 31.5–35.7)
MCV RBC AUTO: 95.8 FL (ref 79–97)
MONOCYTES # BLD AUTO: 0.71 10*3/MM3 (ref 0.1–0.9)
MONOCYTES NFR BLD AUTO: 10.6 % (ref 5–12)
NEUTROPHILS # BLD AUTO: 3.72 10*3/MM3 (ref 1.7–7)
NEUTROPHILS NFR BLD AUTO: 55.8 % (ref 42.7–76)
NRBC BLD AUTO-RTO: 0 /100 WBC (ref 0–0.2)
PLATELET # BLD AUTO: 267 10*3/MM3 (ref 140–450)
PMV BLD AUTO: 9.5 FL (ref 6–12)
POTASSIUM BLD-SCNC: 4.5 MMOL/L (ref 3.5–4.7)
PROT SERPL-MCNC: 7.6 G/DL (ref 6.3–8)
RBC # BLD AUTO: 4.33 10*6/MM3 (ref 3.77–5.28)
SODIUM BLD-SCNC: 140 MMOL/L (ref 134–145)
WBC NRBC COR # BLD: 6.68 10*3/MM3 (ref 3.4–10.8)

## 2019-07-02 PROCEDURE — 85025 COMPLETE CBC W/AUTO DIFF WBC: CPT | Performed by: NURSE PRACTITIONER

## 2019-07-02 PROCEDURE — 99214 OFFICE O/P EST MOD 30 MIN: CPT | Performed by: NURSE PRACTITIONER

## 2019-07-02 PROCEDURE — 36415 COLL VENOUS BLD VENIPUNCTURE: CPT | Performed by: NURSE PRACTITIONER

## 2019-07-02 PROCEDURE — 80053 COMPREHEN METABOLIC PANEL: CPT | Performed by: NURSE PRACTITIONER

## 2019-07-02 NOTE — PROGRESS NOTES
Subjective       REASONS FOR FOLLOWUP:   1. Extensive deep vein thrombosis of the left lower extremity following knee surgery.   2. Progression of clot in spite of Coumadin therapy. The patient is not felt to be a Coumadin failure but may have been subtherapeutic.   3. Lupus inhibitor identified on thrombophilia evaluation.   4. IVC filter placed by Dr. Perry Rosario September 2012.   5. Resolution of lupus anticoagulant 12/12.   6. Persistent active clot in February 2013, switched to Xarelto lifelong.   7. An 8 mm grade 1 lobular carcinoma, on Arimidex for 5 years. Starting 7/2014  8. Firm plaque-like area in the left breast. Mammographically negative in May 2015.     History of Present Illness  Ms. Ferrer is 79-year-old lady with history of hypercoagulable state on lifelong anticoagulation and a small lobular breast cancer on the left, now on Arimidex for 5  years.  She returns today for six-month follow-up.  She just underwent annual mammogram which thankfully was benign.  She also underwent repeat DEXA scan.  We reviewed results of this as well, showing osteopenia with some improvement in bone density.  She does continue to take calcium plus vitamin D.    She is tolerating Arimidex well with no significant arthralgias beyond her baseline arthritis.  She also continues to tolerate Xarelto with no bleeding issues.    She and her  deny any new concerns today.    Active Ambulatory Problems     Diagnosis Date Noted   • Malignant neoplasm of central portion of left female breast (CMS/[facility]) 05/08/2016   • DVT (deep venous thrombosis) (CMS/[facility]) 05/08/2016     Resolved Ambulatory Problems     Diagnosis Date Noted   • No Resolved Ambulatory Problems     Past Medical History:   Diagnosis Date   • Arthritis    • Cancer (CMS/[facility]) 2014   • DVT (deep venous thrombosis) (CMS/[facility]) 2012   • Hyperlipidemia    • Hypertension    • Obesity    PAST SURGICAL HISTORY:   1. Hysterectomy.   2. Left knee replacement surgery in July  2012 with Dr. Wilfredo Arambula.   HEMATOLOGIC/ONCOLOGIC HISTORY:  The patient had undergone left total knee replacement in July.  She unfortunately developed a DVT in the left lower extremity after discontinuing her Coumadin prophylaxis.  At that point she was started on Lovenox and resumed Coumadin.  When her Lovenox s  h  ots were discontinued, she developed some more pain and swelling in the leg and was found to have had some extension of clot in the left leg and was readmitted to [facility].  She underwent IVC filter placement with Dr. Perry Rosario and was starte  d   on IV heparin initially.  Coumadin was continued and we felt that she was not a definite Coumadin failure but that she may have been subtherapeutic when the clot extended.  Her thrombophilia workup did reveal the presence of a lupus inhibitor and for thi  s reason we elected to increase her INR goal to between 2.5 and 3.5.          She also was anemic in the hospital and has been taking oral iron supplementation with some improvement in her hemoglobin.  Repeat iron studies were drawn today but are pending.  Her IN  R in the office today is slightly too high at 3.6 and she will receive a slight Coumadin adjustment dropping from 27 mg of Coumadin weekly down to 24 mg Coumadin weekly (3 mg 5 days a week and 4.5 mg 2 days a week).  Since leaving the hospital she has bee  n faithfully wearing her stocking and is trying to keep the leg elevated and feels that there has been improvement.        Patient seen 10/5/12 doing well.  Lupus anticoagulant and  to be repeated 12 weeks after her original positive lupus anticoagulant.  Her   IVC filter is temporary but I doubt we can remove it at this point.   is probably elevated because of her history of rheumatoid arthritis.        Repeat testing in 12/12 shows resolution of lupus anticoagulant.   is only 1:40 positive.  Plan to repeat her Doppler and assess and consider stopping at six months and  switching to aspirin.        The patient was seen on 3/11/3 clinically stable, but repeat Doppler in February 2013 showed clot that looks active and therefore the patient was switched to Xarelto.          New breast cancer diagnosed mammographically in May 2014. Bridging by stopping her Xarelto 2 days before surgery, Lovenox 40 mg for 3 to 4 days postop and then resumption of Xarelto.      Plan and review her pathology reports in 3 weeks after surgery.              The patient was seen on 10/21/2014, having started Arimidex just 1 week ago. Tolerance so far is good.            Patient seen on 05/12/2015 with persistent induration involving the skin of the left breast  above and around the areola measuring 4 x 3 cm.  Ultrasound and mammogram ordered for followup.              Patient seen on 08/04/2015 doing well on Arimidex with thickening and induration. The plaque-like area in the left breast is unchanged with a negative mammogram and ultrasound in May.     SOCIAL HISTORY: The patient is  and lives with her  in [place], [place]. She is not a smoker and does not drink alcohol.   FAMILY HISTORY: No significant family history of clotting disorders.     Review of Systems   Constitutional: Negative for fatigue.   Respiratory: Negative for chest tightness and shortness of breath.    Cardiovascular: Negative for chest pain.   Gastrointestinal: Negative for constipation, diarrhea, nausea and vomiting.   Genitourinary: Negative for difficulty urinating and frequency.   Neurological: Negative for dizziness and headaches.   Psychiatric/Behavioral: Negative for sleep disturbance.      A comprehensive 14 point review of systems was performed and was negative except as mentioned.    Current Outpatient Medications on File Prior to Visit   Medication Sig Dispense Refill   • amLODIPine (NORVASC) 10 MG tablet Take  by mouth daily.     • anastrozole (ARIMIDEX) 1 MG tablet TAKE 1 TABLET BY MOUTH DAILY 90 tablet 1   •  "atorvastatin (LIPITOR) 40 MG tablet Take 80 mg by mouth.     • Calcium Carb-Cholecalciferol 600-200 MG-UNIT tablet Take  by mouth daily.     • ezetimibe (ZETIA) 10 MG tablet Take 10 mg by mouth Daily.     • metoprolol succinate XL (TOPROL-XL) 100 MG 24 hr tablet Take  by mouth daily.     • rivaroxaban (XARELTO) 20 MG tablet Take  by mouth.       No current facility-administered medications on file prior to visit.          ALLERGIES:    Allergies   Allergen Reactions   • No Known Drug Allergy        Objective      Vitals:    07/02/19 1400   BP: 172/94   Pulse: 68   Resp: 16   Temp: 98 °F (36.7 °C)   TempSrc: Oral   SpO2: 97%   Weight: 110 kg (243 lb 4.8 oz)   Height: 165.1 cm (65\")   PainSc: 0-No pain     Current Status 7/2/2019   ECOG score 0       Physical Exam   Pulmonary/Chest:           GENERAL:  Well-developed, well-nourished in no acute distress.   SKIN:  Warm, dry without rashes, purpura or petechiae.  HEAD:  Normocephalic.  EYES:  Pupils equal, round and reactive to light.  EOMs intact.  Conjunctivae normal.  EARS:  Hearing intact.  NOSE:  Septum midline.  No excoriations or nasal discharge.  MOUTH:  Tongue is well-papillated; no stomatitis or ulcers.  Lips normal.  THROAT:  Oropharynx without lesions or exudates.  NECK:  Supple with good range of motion; no thyromegaly or masses, no JVD.  LYMPHATICS:  No cervical, supraclavicular, axillary or inguinal adenopathy.  CHEST:  Lungs clear to auscultation bilaterally. Good airflow.  BREASTS: Right breast is benign; left breast shows a  thickened area above the areola that is unchanged. No tenderness to palpation.  CARDIAC:  Regular rate and rhythm without murmurs, rubs or gallops. Normal S1,S2.  ABDOMEN:  Soft, nontender with no organomegaly or masses.  EXTREMITIES:  No clubbing, cyanosis or edema.  NEUROLOGICAL:  Cranial Nerves II-XII grossly intact.  No focal neurological deficits.  PSYCHIATRIC:  Normal affect and mood.        RECENT LABS:  Results from last 7 " days   Lab Units 07/02/19  1344   WBC 10*3/mm3 6.68   NEUTROS ABS 10*3/mm3 3.72   HEMOGLOBIN g/dL 13.1   HEMATOCRIT % 41.5   PLATELETS 10*3/mm3 267             RADIOGRAPHIC DATA:  Mammogram 6/25/2019:  Finding 1: stable focal asymmetry in the left breast, benign-negative.  Finding 2: stable calcifications in the left breast are benign-negative    DEXA scan 6/25/2019:  T score in the left femoral neck measures -1.8, in the range of osteopenia compared to previous exam dated 5/23/2017, there is been a 9.7% increase in bone density.    Lumbar spine T score 1.9, in the range of normal.  Compared to previous exam dated 5/23/2017, there has been a 5.2% increase in bone density.    Impression: patient's bone density is in the range of osteopenia.    Above findings of both mammogram and DEXA scan reviewed with patient and her  today, 7/2/2019.    Assessment/Plan   1. Extensive deep vein thrombosis of the left lower extremity following knee surgery.   2. Progression of clot in spite of Coumadin therapy. The patient is not felt to be a Coumadin failure but may have been subtherapeutic. Now on Xarelto.  3. Lupus inhibitor identified on thrombophilia evaluation.   4. IVC filter placed by Dr. Perry Rosario September 2012.   5. Resolution of lupus anticoagulant 12/12.   6. Persistent active clot in February 2013, switched to Xarelto lifelong.   7. An 8 mm grade 1 lobular carcinoma of the breast, on Arimidex for 5 years. Starting 7/2014. Tolerating well.  8. Firm plaque-like area in the left breast. Mammographically negative in May 2015    Plan  1. Continue Arimidex and Xarelto  2. Patient will return in 6 months for follow-up with Dr. Agustin, CBC and CMP.  3.  Based on most recent DEXA scan report showing improved bone mineral density, we will have the patient continue on Arimidex for at least 2 more years.             7/2/2019      CC:

## 2019-08-03 ENCOUNTER — APPOINTMENT (OUTPATIENT)
Dept: CT IMAGING | Facility: HOSPITAL | Age: 79
End: 2019-08-03

## 2019-08-03 ENCOUNTER — APPOINTMENT (OUTPATIENT)
Dept: CARDIOLOGY | Facility: HOSPITAL | Age: 79
End: 2019-08-03

## 2019-08-03 ENCOUNTER — HOSPITAL ENCOUNTER (INPATIENT)
Facility: HOSPITAL | Age: 79
LOS: 5 days | Discharge: HOME | End: 2019-08-08
Attending: EMERGENCY MEDICINE | Admitting: INTERNAL MEDICINE

## 2019-08-03 DIAGNOSIS — R55 NEAR SYNCOPE: ICD-10-CM

## 2019-08-03 DIAGNOSIS — M62.81 MUSCLE WEAKNESS (GENERALIZED): ICD-10-CM

## 2019-08-03 DIAGNOSIS — I48.92 NEW ONSET ATRIAL FLUTTER (HCC): ICD-10-CM

## 2019-08-03 DIAGNOSIS — I49.5 SICK SINUS SYNDROME (HCC): Primary | ICD-10-CM

## 2019-08-03 DIAGNOSIS — R53.1 GENERALIZED WEAKNESS: ICD-10-CM

## 2019-08-03 LAB
ALBUMIN SERPL-MCNC: 3.3 G/DL (ref 3.5–5.2)
ALBUMIN/GLOB SERPL: 0.9 G/DL
ALP SERPL-CCNC: 179 U/L (ref 39–117)
ALT SERPL W P-5'-P-CCNC: 26 U/L (ref 1–33)
ANION GAP SERPL CALCULATED.3IONS-SCNC: 15.7 MMOL/L (ref 5–15)
AST SERPL-CCNC: 31 U/L (ref 1–32)
BACTERIA UR QL AUTO: ABNORMAL /HPF
BASOPHILS # BLD AUTO: 0.04 10*3/MM3 (ref 0–0.2)
BASOPHILS NFR BLD AUTO: 0.3 % (ref 0–1.5)
BILIRUB SERPL-MCNC: 0.8 MG/DL (ref 0.2–1.2)
BILIRUB UR QL STRIP: NEGATIVE
BUN BLD-MCNC: 24 MG/DL (ref 8–23)
BUN/CREAT SERPL: 16.7 (ref 7–25)
CALCIUM SPEC-SCNC: 9.2 MG/DL (ref 8.6–10.5)
CHLORIDE SERPL-SCNC: 98 MMOL/L (ref 98–107)
CLARITY UR: CLEAR
CO2 SERPL-SCNC: 22.3 MMOL/L (ref 22–29)
COLOR UR: YELLOW
CREAT BLD-MCNC: 1.44 MG/DL (ref 0.57–1)
D-LACTATE SERPL-SCNC: 0.9 MMOL/L (ref 0.5–2)
DEPRECATED RDW RBC AUTO: 46.5 FL (ref 37–54)
EOSINOPHIL # BLD AUTO: 0.03 10*3/MM3 (ref 0–0.4)
EOSINOPHIL NFR BLD AUTO: 0.2 % (ref 0.3–6.2)
ERYTHROCYTE [DISTWIDTH] IN BLOOD BY AUTOMATED COUNT: 13.6 % (ref 12.3–15.4)
GFR SERPL CREATININE-BSD FRML MDRD: 35 ML/MIN/1.73
GLOBULIN UR ELPH-MCNC: 3.7 GM/DL
GLUCOSE BLD-MCNC: 192 MG/DL (ref 65–99)
GLUCOSE BLDC GLUCOMTR-MCNC: 156 MG/DL (ref 70–130)
GLUCOSE UR STRIP-MCNC: NEGATIVE MG/DL
HCT VFR BLD AUTO: 34.9 % (ref 34–46.6)
HGB BLD-MCNC: 11.1 G/DL (ref 12–15.9)
HGB UR QL STRIP.AUTO: ABNORMAL
HOLD SPECIMEN: NORMAL
HOLD SPECIMEN: NORMAL
HYALINE CASTS UR QL AUTO: ABNORMAL /LPF
IMM GRANULOCYTES # BLD AUTO: 0.12 10*3/MM3 (ref 0–0.05)
IMM GRANULOCYTES NFR BLD AUTO: 0.9 % (ref 0–0.5)
KETONES UR QL STRIP: NEGATIVE
LEUKOCYTE ESTERASE UR QL STRIP.AUTO: ABNORMAL
LIPASE SERPL-CCNC: 31 U/L (ref 13–60)
LYMPHOCYTES # BLD AUTO: 0.85 10*3/MM3 (ref 0.7–3.1)
LYMPHOCYTES NFR BLD AUTO: 6.3 % (ref 19.6–45.3)
MAGNESIUM SERPL-MCNC: 2.2 MG/DL (ref 1.6–2.4)
MCH RBC QN AUTO: 29.8 PG (ref 26.6–33)
MCHC RBC AUTO-ENTMCNC: 31.8 G/DL (ref 31.5–35.7)
MCV RBC AUTO: 93.6 FL (ref 79–97)
MONOCYTES # BLD AUTO: 1.22 10*3/MM3 (ref 0.1–0.9)
MONOCYTES NFR BLD AUTO: 9 % (ref 5–12)
NEUTROPHILS # BLD AUTO: 11.32 10*3/MM3 (ref 1.7–7)
NEUTROPHILS NFR BLD AUTO: 83.3 % (ref 42.7–76)
NITRITE UR QL STRIP: NEGATIVE
NRBC BLD AUTO-RTO: 0 /100 WBC (ref 0–0.2)
PH UR STRIP.AUTO: 6 [PH] (ref 5–8)
PLATELET # BLD AUTO: 220 10*3/MM3 (ref 140–450)
PMV BLD AUTO: 10.2 FL (ref 6–12)
POTASSIUM BLD-SCNC: 3.9 MMOL/L (ref 3.5–5.2)
PROCALCITONIN SERPL-MCNC: 8.32 NG/ML (ref 0.1–0.25)
PROT SERPL-MCNC: 7 G/DL (ref 6–8.5)
PROT UR QL STRIP: ABNORMAL
RBC # BLD AUTO: 3.73 10*6/MM3 (ref 3.77–5.28)
RBC # UR: ABNORMAL /HPF
REF LAB TEST METHOD: ABNORMAL
SODIUM BLD-SCNC: 136 MMOL/L (ref 136–145)
SP GR UR STRIP: >=1.03 (ref 1–1.03)
SQUAMOUS #/AREA URNS HPF: ABNORMAL /HPF
T3FREE SERPL-MCNC: 2.61 PG/ML (ref 2–4.4)
T4 FREE SERPL-MCNC: 1.05 NG/DL (ref 0.93–1.7)
TROPONIN T SERPL-MCNC: <0.01 NG/ML (ref 0–0.03)
TSH SERPL DL<=0.05 MIU/L-ACNC: 12.1 MIU/ML (ref 0.27–4.2)
UROBILINOGEN UR QL STRIP: ABNORMAL
WBC NRBC COR # BLD: 13.58 10*3/MM3 (ref 3.4–10.8)
WBC UR QL AUTO: ABNORMAL /HPF
WHOLE BLOOD HOLD SPECIMEN: NORMAL
WHOLE BLOOD HOLD SPECIMEN: NORMAL

## 2019-08-03 PROCEDURE — 99284 EMERGENCY DEPT VISIT MOD MDM: CPT

## 2019-08-03 PROCEDURE — 93970 EXTREMITY STUDY: CPT

## 2019-08-03 PROCEDURE — 87150 DNA/RNA AMPLIFIED PROBE: CPT | Performed by: INTERNAL MEDICINE

## 2019-08-03 PROCEDURE — 85025 COMPLETE CBC W/AUTO DIFF WBC: CPT | Performed by: EMERGENCY MEDICINE

## 2019-08-03 PROCEDURE — 87088 URINE BACTERIA CULTURE: CPT | Performed by: INTERNAL MEDICINE

## 2019-08-03 PROCEDURE — 80053 COMPREHEN METABOLIC PANEL: CPT | Performed by: EMERGENCY MEDICINE

## 2019-08-03 PROCEDURE — 83735 ASSAY OF MAGNESIUM: CPT | Performed by: EMERGENCY MEDICINE

## 2019-08-03 PROCEDURE — 84439 ASSAY OF FREE THYROXINE: CPT | Performed by: INTERNAL MEDICINE

## 2019-08-03 PROCEDURE — 93010 ELECTROCARDIOGRAM REPORT: CPT | Performed by: INTERNAL MEDICINE

## 2019-08-03 PROCEDURE — 84484 ASSAY OF TROPONIN QUANT: CPT | Performed by: INTERNAL MEDICINE

## 2019-08-03 PROCEDURE — 87186 SC STD MICRODIL/AGAR DIL: CPT | Performed by: INTERNAL MEDICINE

## 2019-08-03 PROCEDURE — 81001 URINALYSIS AUTO W/SCOPE: CPT | Performed by: EMERGENCY MEDICINE

## 2019-08-03 PROCEDURE — 82962 GLUCOSE BLOOD TEST: CPT

## 2019-08-03 PROCEDURE — 83605 ASSAY OF LACTIC ACID: CPT | Performed by: INTERNAL MEDICINE

## 2019-08-03 PROCEDURE — 25010000002 CEFTRIAXONE PER 250 MG: Performed by: INTERNAL MEDICINE

## 2019-08-03 PROCEDURE — 84443 ASSAY THYROID STIM HORMONE: CPT | Performed by: EMERGENCY MEDICINE

## 2019-08-03 PROCEDURE — 87086 URINE CULTURE/COLONY COUNT: CPT | Performed by: INTERNAL MEDICINE

## 2019-08-03 PROCEDURE — 25010000002 AMIODARONE IN DEXTROSE 5% 360-4.14 MG/200ML-% SOLUTION: Performed by: INTERNAL MEDICINE

## 2019-08-03 PROCEDURE — 74177 CT ABD & PELVIS W/CONTRAST: CPT

## 2019-08-03 PROCEDURE — 25010000002 AMIODARONE IN DEXTROSE 5% 150-4.21 MG/100ML-% SOLUTION: Performed by: INTERNAL MEDICINE

## 2019-08-03 PROCEDURE — 87040 BLOOD CULTURE FOR BACTERIA: CPT | Performed by: INTERNAL MEDICINE

## 2019-08-03 PROCEDURE — 99222 1ST HOSP IP/OBS MODERATE 55: CPT | Performed by: INTERNAL MEDICINE

## 2019-08-03 PROCEDURE — 84481 FREE ASSAY (FT-3): CPT | Performed by: INTERNAL MEDICINE

## 2019-08-03 PROCEDURE — 25010000002 IOPAMIDOL 61 % SOLUTION: Performed by: EMERGENCY MEDICINE

## 2019-08-03 PROCEDURE — 93005 ELECTROCARDIOGRAM TRACING: CPT | Performed by: EMERGENCY MEDICINE

## 2019-08-03 PROCEDURE — 83690 ASSAY OF LIPASE: CPT | Performed by: EMERGENCY MEDICINE

## 2019-08-03 PROCEDURE — 94799 UNLISTED PULMONARY SVC/PX: CPT

## 2019-08-03 PROCEDURE — 84145 PROCALCITONIN (PCT): CPT | Performed by: INTERNAL MEDICINE

## 2019-08-03 RX ORDER — BIOTIN 5 MG
TABLET ORAL
COMMUNITY

## 2019-08-03 RX ORDER — SODIUM CHLORIDE 9 MG/ML
125 INJECTION, SOLUTION INTRAVENOUS CONTINUOUS
Status: DISCONTINUED | OUTPATIENT
Start: 2019-08-03 | End: 2019-08-03

## 2019-08-03 RX ORDER — DEXTROMETHORPHAN HYDROBROMIDE AND PROMETHAZINE HYDROCHLORIDE 15; 6.25 MG/5ML; MG/5ML
SOLUTION ORAL 4 TIMES DAILY PRN
COMMUNITY
End: 2020-07-15

## 2019-08-03 RX ORDER — SODIUM CHLORIDE 9 MG/ML
125 INJECTION, SOLUTION INTRAVENOUS CONTINUOUS
Status: DISCONTINUED | OUTPATIENT
Start: 2019-08-03 | End: 2019-08-05

## 2019-08-03 RX ORDER — NITROFURANTOIN MACROCRYSTALS 100 MG/1
100 CAPSULE ORAL 4 TIMES DAILY
COMMUNITY
End: 2019-08-08 | Stop reason: HOSPADM

## 2019-08-03 RX ORDER — CEFTRIAXONE SODIUM 1 G/50ML
1 INJECTION, SOLUTION INTRAVENOUS EVERY 24 HOURS
Status: DISCONTINUED | OUTPATIENT
Start: 2019-08-03 | End: 2019-08-05

## 2019-08-03 RX ORDER — SODIUM CHLORIDE 0.9 % (FLUSH) 0.9 %
10 SYRINGE (ML) INJECTION AS NEEDED
Status: DISCONTINUED | OUTPATIENT
Start: 2019-08-03 | End: 2019-08-08

## 2019-08-03 RX ORDER — SODIUM CHLORIDE 0.9 % (FLUSH) 0.9 %
3-10 SYRINGE (ML) INJECTION AS NEEDED
Status: DISCONTINUED | OUTPATIENT
Start: 2019-08-03 | End: 2019-08-08

## 2019-08-03 RX ORDER — SODIUM CHLORIDE 0.9 % (FLUSH) 0.9 %
3 SYRINGE (ML) INJECTION EVERY 12 HOURS SCHEDULED
Status: DISCONTINUED | OUTPATIENT
Start: 2019-08-03 | End: 2019-08-08

## 2019-08-03 RX ADMIN — CEFTRIAXONE SODIUM 1 G: 1 INJECTION, SOLUTION INTRAVENOUS at 14:48

## 2019-08-03 RX ADMIN — AMIODARONE HYDROCHLORIDE 0.5 MG/MIN: 1.8 INJECTION, SOLUTION INTRAVENOUS at 21:47

## 2019-08-03 RX ADMIN — AMIODARONE HYDROCHLORIDE 1 MG/MIN: 1.8 INJECTION, SOLUTION INTRAVENOUS at 15:05

## 2019-08-03 RX ADMIN — SODIUM CHLORIDE, PRESERVATIVE FREE 3 ML: 5 INJECTION INTRAVENOUS at 14:00

## 2019-08-03 RX ADMIN — IOPAMIDOL 85 ML: 612 INJECTION, SOLUTION INTRAVENOUS at 10:24

## 2019-08-03 RX ADMIN — SODIUM CHLORIDE 125 ML/HR: 9 INJECTION, SOLUTION INTRAVENOUS at 09:31

## 2019-08-03 RX ADMIN — RIVAROXABAN 20 MG: 20 TABLET, FILM COATED ORAL at 21:47

## 2019-08-03 RX ADMIN — AMIODARONE HYDROCHLORIDE 150 MG: 1.5 INJECTION, SOLUTION INTRAVENOUS at 14:48

## 2019-08-03 RX ADMIN — SODIUM CHLORIDE, PRESERVATIVE FREE 3 ML: 5 INJECTION INTRAVENOUS at 20:38

## 2019-08-03 NOTE — ED PROVIDER NOTES
" EMERGENCY DEPARTMENT ENCOUNTER    CHIEF COMPLAINT  Chief Complaint: diarrhea  History given by: patient, patient's family  History limited by: none  Room Number: N338/1  PMD: Micheal Dixon      HPI:  Pt is a 79 y.o. female who presents complaining of diarrhea and nausea that started one week ago. Pt has also been having dysuria for the last week and was diagnosed with a UTI two days ago for which she was started on macrobid. Pt reports seeing \"bright red\" blood x1 in her stool last night. Pt also c/o fever T-max 100.2 degrees, generalized weakness, and chills for the last three days. Pt states that she has had a non-productive cough for the last two months. Pt also c/o intermittent lightheadedness and lower abd pain (L side greater than R). Pt denies vomiting, CP, and SOA. Pt has hx of total hysterectomy. Pt denies hx of any cardiac issues and does not have a cardiologist that she sees. Pt denies any other recent abx use aside from macrobid. Pt is currently taking xarelto. Patient's family at bedside.     Duration: one week  Onset: gradual  Timing: intermittent  Location: n/a  Radiation: n/a  Quality: diarrhea  Intensity/Severity: moderate  Progression: unchanged  Associated Symptoms: dysuria, \"bright red\" blood in stool, fever T-max 100.2 degrees, generalized weakness, chills, non-productive cough, intermittent lightheadedness, lower abd pain (L greater than R)  Aggravating Factors: none  Alleviating Factors: none  Previous Episodes: none  Treatment before arrival: Pt was started on macrobid for UTI two days ago.     PAST MEDICAL HISTORY  Active Ambulatory Problems     Diagnosis Date Noted   • Malignant neoplasm of central portion of left female breast (CMS/[facility]) 05/08/2016   • DVT (deep venous thrombosis) (CMS/[facility]) 05/08/2016   • Osteopenia of multiple sites 07/02/2019     Resolved Ambulatory Problems     Diagnosis Date Noted   • No Resolved Ambulatory Problems     Past Medical History:   Diagnosis Date   • " "Arthritis    • Cancer (CMS/[facility]) 2014   • DVT (deep venous thrombosis) (CMS/[facility]) 2012   • Hyperlipidemia    • Hypertension    • Obesity        PAST SURGICAL HISTORY  Past Surgical History:   Procedure Laterality Date   • BREAST BIOPSY Left 05/2014   • BREAST LUMPECTOMY W/ NEEDLE LOCALIZATION Left 06/26/2014   • HYSTERECTOMY     • TOTAL KNEE ARTHROPLASTY  07/2012    Left   • VENA CAVA FILTER INSERTION  09/2012    IVC filter        FAMILY HISTORY  Family History   Problem Relation Age of Onset   • Kidney cancer Mother 78   • Clotting disorder Neg Hx        SOCIAL HISTORY  Social History     Socioeconomic History   • Marital status:      Spouse name: Bharathi   • Number of children: Not on file   • Years of education: Not on file   • Highest education level: Not on file   Occupational History     Employer: RETIRED   Tobacco Use   • Smoking status: Never Smoker   • Smokeless tobacco: Never Used   Substance and Sexual Activity   • Alcohol use: No   • Drug use: No   • Sexual activity: Defer       ALLERGIES  No known drug allergy    REVIEW OF SYSTEMS  Review of Systems   Constitutional: Positive for chills and fever (T-max 100.2 degrees).   HENT: Negative for rhinorrhea and sore throat.    Eyes: Negative for visual disturbance.   Respiratory: Positive for cough (non-productive, for two months). Negative for shortness of breath.    Cardiovascular: Negative for chest pain, palpitations and leg swelling.   Gastrointestinal: Positive for abdominal pain (lower, L greater than R), blood in stool (\"bright red\" x1 last night), diarrhea and nausea. Negative for vomiting.   Endocrine: Negative.    Genitourinary: Positive for dysuria. Negative for decreased urine volume and frequency.   Musculoskeletal: Negative for neck pain.   Skin: Negative for rash.   Neurological: Positive for weakness (generalized) and light-headedness (intermittent). Negative for syncope and headaches.   Psychiatric/Behavioral: Negative.    All other systems " reviewed and are negative.      PHYSICAL EXAM  ED Triage Vitals [08/03/19 0905]   Temp Heart Rate Resp BP SpO2   -- 95 15 -- 95 %      Temp src Heart Rate Source Patient Position BP Location FiO2 (%)   -- -- -- -- --       Physical Exam   Constitutional: She is oriented to person, place, and time.  Non-toxic appearance. She appears distressed (mild).   HENT:   Head: Normocephalic and atraumatic.   Eyes: EOM are normal.   Neck: Normal range of motion. Neck supple.   Cardiovascular: Normal heart sounds and intact distal pulses. An irregular rhythm present. Tachycardia present.   No murmur heard.  Pulses:       Posterior tibial pulses are 2+ on the right side, and 2+ on the left side.   HR over 100 bpm   Pulmonary/Chest: Effort normal and breath sounds normal. No respiratory distress.   Good air movement bilaterally   Abdominal: Soft. Bowel sounds are normal. There is no tenderness. There is no rebound and no guarding.   Minor discomfort to palpation, diffuse, no guarding/rebound    Musculoskeletal: Normal range of motion. She exhibits no edema ( no significant).   Neurological: She is alert and oriented to person, place, and time.   Skin: Skin is warm and dry.   Psychiatric: Affect normal.   Nursing note and vitals reviewed.      LAB RESULTS  Lab Results (last 24 hours)     Procedure Component Value Units Date/Time    CBC & Differential [[id number]] Collected:  08/03/19 0930    Specimen:  Blood Updated:  08/03/19 0956    Narrative:       The following orders were created for panel order CBC & Differential.  Procedure                               Abnormality         Status                     ---------                               -----------         ------                     CBC Auto Differential[[id number]]        Abnormal            Final result                 Please view results for these tests on the individual orders.    Comprehensive Metabolic Panel [[id number]]  (Abnormal) Collected:  08/03/19 0930     Specimen:  Blood Updated:  08/03/19 1003     Glucose 192 mg/dL      BUN 24 mg/dL      Creatinine 1.44 mg/dL      Sodium 136 mmol/L      Potassium 3.9 mmol/L      Chloride 98 mmol/L      CO2 22.3 mmol/L      Calcium 9.2 mg/dL      Total Protein 7.0 g/dL      Albumin 3.30 g/dL      ALT (SGPT) 26 U/L      AST (SGOT) 31 U/L      Alkaline Phosphatase 179 U/L      Total Bilirubin 0.8 mg/dL      eGFR Non African Amer 35 mL/min/1.73      Globulin 3.7 gm/dL      A/G Ratio 0.9 g/dL      BUN/Creatinine Ratio 16.7     Anion Gap 15.7 mmol/L     Narrative:       GFR Normal >60  Chronic Kidney Disease <60  Kidney Failure <15    Lipase [[id number]]  (Normal) Collected:  08/03/19 0930    Specimen:  Blood Updated:  08/03/19 1003     Lipase 31 U/L     CBC Auto Differential [[id number]]  (Abnormal) Collected:  08/03/19 0930    Specimen:  Blood Updated:  08/03/19 0956     WBC 13.58 10*3/mm3      RBC 3.73 10*6/mm3      Hemoglobin 11.1 g/dL      Hematocrit 34.9 %      MCV 93.6 fL      MCH 29.8 pg      MCHC 31.8 g/dL      RDW 13.6 %      RDW-SD 46.5 fl      MPV 10.2 fL      Platelets 220 10*3/mm3      Neutrophil % 83.3 %      Lymphocyte % 6.3 %      Monocyte % 9.0 %      Eosinophil % 0.2 %      Basophil % 0.3 %      Immature Grans % 0.9 %      Neutrophils, Absolute 11.32 10*3/mm3      Lymphocytes, Absolute 0.85 10*3/mm3      Monocytes, Absolute 1.22 10*3/mm3      Eosinophils, Absolute 0.03 10*3/mm3      Basophils, Absolute 0.04 10*3/mm3      Immature Grans, Absolute 0.12 10*3/mm3      nRBC 0.0 /100 WBC     Magnesium [[id number]]  (Normal) Collected:  08/03/19 0930    Specimen:  Blood Updated:  08/03/19 1003     Magnesium 2.2 mg/dL     TSH [[id number]]  (Abnormal) Collected:  08/03/19 0930    Specimen:  Blood Updated:  08/03/19 1010     TSH 12.100 mIU/mL           I ordered the above labs and reviewed the results    RADIOLOGY  CT Abdomen Pelvis With Contrast   Final Result       1. Slight heterogeneity at the mid aspect of the left kidney with  mild   left perinephric fat stranding, may reflect pyelonephritis.   2. Some hazy density with fat inferior to the left kidney may be related   to left renal inflammation, also near the proximal sigmoid colon, but   does not appear localized to any of the numerous colonic diverticula.   Follow-up as indications persist.       Discussed by telephone with Dr. Scott at 1040, 08/03/2019.       This report was finalized on 8/3/2019 10:44 AM by Dr. Jean Simms M.D.             I ordered the above noted radiological studies.  Interpreted by radiologist.discussed with Dr Simms after patient left ED. Reviewed by me in PACS.       PROCEDURES  Critical Care  Performed by: Angelina Scott MD  Authorized by: Angelina Scott MD     Critical care provider statement:     Critical care time (minutes):  32    Critical care time was exclusive of:  Separately billable procedures and treating other patients    Critical care was necessary to treat or prevent imminent or life-threatening deterioration of the following conditions:  Circulatory failure    Critical care was time spent personally by me on the following activities:  Development of treatment plan with patient or surrogate, discussions with consultants, evaluation of patient's response to treatment, examination of patient, obtaining history from patient or surrogate, ordering and performing treatments and interventions, ordering and review of laboratory studies, ordering and review of radiographic studies, pulse oximetry, re-evaluation of patient's condition and review of old charts    I assumed direction of critical care for this patient from another provider in my specialty: no            EKG          EKG time: 0940  Rhythm/Rate: a-flutter, rate in 70s with occasional PVCs and an episode of asystolic tracing lasting approximately 4 seconds   P waves and NC: LVH  QRS, axis: Q waves in inferior leads, long QT   ST and T waves: non-specific ST and T wave findings      Interpreted Contemporaneously by me, independently viewed.     EKG          EKG time: 0941  Rhythm/Rate: a-flutter, rate in 100s  P waves and MD: LVH  QRS, axis: Q waves in inferior leads  ST and T waves: non-specific ST and T wave findings     Interpreted Contemporaneously by me, independently viewed.    EKG          EKG time: 0942  Rhythm/Rate: a-flutter, rate in 110s  P waves and MD: LVH  QRS, axis: long period of absence of electrical activity for greater than 4 seconds   ST and T waves: non-specific ST and T wave findings     Interpreted Contemporaneously by me, independently viewed.     PROGRESS AND CONSULTS     0943 Rechecked pt. Pt's EKG is showing atrial-flutter with an episode of asystolic tracing lasting approximately 4 seconds. Discussed the plan to speak with Cardiology and admit the pt to the ICU. Pt understands and agrees with the plan, all questions answered.    0948 Received a call from Dr. Lopez (Cardiologist) and discussed patient's case including concerning EKG findings. Dr. Lopez agrees with the plan to admit the pt to medicine, observe in the ICU and will see the pt in consult.     0950 Call placed to Pulmonology.     1001 Received a call from Dr. Hood (Pulmonologist) and discussed patient's case. Dr. Hood is aware CT abdomen is pending and agrees with the plan to admit the pt to the ICU for further testing/treatment as needed.     MEDICAL DECISION MAKING  Results were reviewed/discussed with the patient and they were also made aware of online access. Pt also made aware that some labs, such as cultures, will not be resulted during ER visit and follow up with PMD is necessary.     MDM  Number of Diagnoses or Management Options  Generalized weakness:   Near syncope:   New onset atrial flutter (CMS/HCC):   Sick sinus syndrome (CMS/HCC):      Amount and/or Complexity of Data Reviewed  Clinical lab tests: ordered and reviewed (WBC - 13.58)  Tests in the radiology section of CPT®:  ordered  Tests in the medicine section of CPT®: ordered and reviewed (EKGs - see procedure note)  Discussion of test results with the performing providers: yes (Dr. Simms -- Radiologist)  Obtain history from someone other than the patient: yes (Pt's family)  Discuss the patient with other providers: yes (Dr. Lopez -- Cardiologist, Dr. Hood -- Pulmonologist)  Independent visualization of images, tracings, or specimens: yes    Critical Care  Total time providing critical care: 30-74 minutes (32 minutes)         DIAGNOSIS  Final diagnoses:   Sick sinus syndrome (CMS/HCC)   New onset atrial flutter (CMS/HCC)   Generalized weakness   Near syncope       DISPOSITION  ADMISSION    Discussed treatment plan and reason for admission with pt/family and admitting physician.  Pt/family voiced understanding of the plan for admission for further testing/treatment as needed.         Latest Documented Vital Signs:  As of 10:50 AM  BP- 127/80 HR- 104 Temp- 98.7 °F (37.1 °C) (Oral) O2 sat- 95%    --  Documentation assistance provided by praneeth Hernandez for Dr. Scott.  Information recorded by the scribe was done at my direction and has been verified and validated by me.     Seun Hernandez  08/03/19 1059       Angelina Scott MD  08/04/19 4730

## 2019-08-03 NOTE — H&P
Patient Care Team:  Micheal Dixon as PCP - General (Cardiology)  Provider, No Known as PCP - Family Medicine  Angela Agustin MD as PCP - Claims Attributed  Angela Agustin MD as Consulting Physician (Hematology and Oncology)  Jorge Luis Pichardo MD as Referring Physician (Family Medicine)    Chief complaint:  Generalized weakness    History of present illness:  Subjective   This is a 79-year-old female patient, lifelong non-smoker who presented here today for generalized weakness and near syncope.    She stated that she became sick on Sunday.  Initially started with burning during urination and then followed by dry heaves, decreased p.o. intake, generalized weakness and episodes of dizziness and near syncope described as about to pass out.  She visited her primary care physician on Thursday (8/1/2019) and was diagnosed with UTI.  She was prescribed nitrofurantoin.  Her symptoms did not improve and she gradually got worse with more weakness and more events of near syncope.    In addition, she described having frequent loose stool which started around Sunday as well and have gotten worse.  She denies eating outside or eating undercooked meat.  She had associated dry heaves as above but no vomiting.  She had mild abdominal discomfort as well.  She stated that she saw a red blood one time when she wiped but otherwise no blood in her stool.  Her daughter was present at bedside stated that she had to take her to the bathroom multiple times today and did not see any blood.    On arrival to the ED, patient was noted to be in a flutter with RVR and had a very long sinus pause.  Cardiology contacted but they felt that this was precipitated by metabolic issues and opted to see the patient as a consultant only.  Patient denies prior history of cardiac issues or arrhythmia but reported feeling palpitation and skipped beats during her latest illness.  She denies chest pain.    She reported cough which started  couple of months ago, predominantly dry and associated with shortness of breath on activity.  She has not seen a physician for that.  Cough is getting worse.      I reviewed the data from [facility]:  Had UA on 8/1 which showed proteinuria and positive blood and leukocyte esterase but negative nitrite    Labs reviewed:  Creatinine 1.4 (baseline 1); TSH 12; WBC 13.5; hemoglobin 11      Review of Systems:  Constitutional: No fever or chills.  Decreased appetite and p.o. intake.  ENMT: No sinus congestion or postnasal drip.  Cardiovascular: No chest pain  or legs swelling but palpitation.    Respiratory: Cough as above associated with dyspnea.  No wheezing.  Gastrointestinal: Diarrhea.  Mild abdominal discomfort.  Dry heaves.  No vomiting  Neurology: No headache, numbness or dizziness but generalized weakness  Musculoskeletal: Joints pain but no stiffness or swelling.  Psychiatry: No depression or anxiety.  Genitourinary: Dysuria and decreased urination lately.  Endo: No weight changes. No cold or warm intolerance.  Lymphatic: No swollen glands.  Integumentary: No rash.    History  Past Medical History:   Diagnosis Date   • Arthritis     Osteoarthritis   • Cancer (CMS/HCC) 2014    Stage I lobular left breast cancer   • DVT (deep venous thrombosis) (CMS/HCC) 2012    Secondary to knee surgery   • Hyperlipidemia    • Hypertension    • Obesity      Past Surgical History:   Procedure Laterality Date   • BREAST BIOPSY Left 05/2014   • BREAST LUMPECTOMY W/ NEEDLE LOCALIZATION Left 06/26/2014   • HYSTERECTOMY     • TOTAL KNEE ARTHROPLASTY  07/2012    Left   • VENA CAVA FILTER INSERTION  09/2012    IVC filter      Family History   Problem Relation Age of Onset   • Kidney cancer Mother 78   • Clotting disorder Neg Hx      Social History     Tobacco Use   • Smoking status: Never Smoker   • Smokeless tobacco: Never Used   Substance Use Topics   • Alcohol use: No   • Drug use: No       (Not in a hospital admission)  Allergies:  No  known drug allergy    Objective   Vital Signs  Temp:  [98.7 °F (37.1 °C)] 98.7 °F (37.1 °C)  Heart Rate:  [] 101  Resp:  [15] 15  BP: (117-136)/(68-90) 117/68    Physical Exam:  Constitutional: Not in acute distress.  Eyes: Injected conjunctiva, EOMI. pupils equal reactive to light.  ENMT: Li and Mallampati 3. No oral thrush.  Large tongue.  Neck: No thyromegaly.  Trachea midline.  Large neck  Heart: Irregular rhythm and rate.  No pitting edema.  No audible murmur.  Lungs: Good and equal air entry bilaterally.  Nonlabored breathing at rest.  Bilateral basal crackles.  No wheezing.  Abdomen: Obese. Soft. No tenderness or dullness.  Positive bowel sounds  Extremities: No cyanosis, clubbing. Moves all extremities.  Warm extremities and well-perfused  Neuro: Conscious, alert, oriented x3.  Strength 5/5 in arms and legs.  Psych: Appropriate mood and affect.    Integumentary: No rash or ecchymosis.  Normal skin turgor.  Normal capillary refill.  Lymphatic: No palpable cervical or supraclavicular lymph nodes.      Diagnostic imaging:  I personally and independently reviewed the following images:     CT abdomen 8/3/2019:  Minimal subpleural reticular infiltrates in the lungs.      EKG 8/3/19:  A flutter. Long sinus pauses.         Assessment     1. A flutter with RVR, NEW  2. Prolonged sinus pauses  3. ROSE, 2ary to dehydration  4. Suspected UTI complicated by Mild left hydronephrosis  5. Sepsis, secondary #4  6. Diarrhea  7. Nausea  8. Anemia  9. Elevated TSH  10. Chronic cough  11. Mild colonic diverticulosis  12. Lupus anticoagulant  13. Extensive left LE DVT post knee surgery in 2012, failed warfarin on chronic Xarelto s/p IVC filter 2012  14. Hx of Breast ca, lobular carcinoma, s/p chemotherapy 2014, in remission      Plan:  · Consult cardiology for a flutter and sinus pauses. My require temporary pacemaker. Not clear why she has pauses, will defer to cardiology. She's dehydrated which can put her in A  flutter but she does not really have severe seosis  · Check cardiac enzymes and echocardiogram.    · FT3 and FT4 due to elevated TSH.  · Check stool cx and PCR.   · Check urine culture.  Rocephin for UTI in the meanwhile.  · Doppler legs. R/O PE but cannot do CTA due to ROSE. She's borderline hypoxic at times but am not sure if it is related to the arrhythmia or poor waveform.  · IV hydration with normal saline.  · Stool for occult blood  · CT chest without contrast due to chronic cough  · Hold beta-blocker due to sinus pauses  · N.p.o. for possible cardiac intervention  · Resume Xarelto        I discussed the patients findings and my recommendations with patient, family and nursing staff.     Lety Hood MD  08/03/19  10:01 AM    Time: Critical care 35 min      This note was dictated utilizing Dragon dictation

## 2019-08-03 NOTE — CONSULTS
Patient Name: Dorota Ferrer  :1940  79 y.o.    Date of Admission: 8/3/2019  Date of Consultation:  19  Encounter Provider: Jong Perez MD  Place of Service: [facility] CARDIOLOGY  Referring Provider: Lety Hood MD  Patient Care Team:  Micheal Dixon as PCP - General (Cardiology)  Provider, No Known as PCP - Family Medicine  Angela Agustin MD as PCP - Claims Attributed  Angela Agustin MD as Consulting Physician (Hematology and Oncology)  Jorge Luis Pichardo MD as Referring Physician (Family Medicine)      Chief complaint: nasuea    History of Present Illness:   79 year old female patient with history of breast cancer, DVT (Xarelto), hypertension and hyperlipidemia. She presented to the ED 8/3/19 with reports of nausea, generalized weakness, intermittent lightheadedness, abdominal pain and fever for the past 3 days with associated diarrhea and blood noted in her stool last night. She was recently diagnosed with a UTI and placed on Macrobid. EKG in the ED noted atrial flutter with an episode of asystole lasting approximately 4 seconds.     Creatinine 1.44. TSH 12.100. CT abdomen showed evidence of possible pyelonephritis to left kidney.  Patient was started on antibiotics.  Still with tachycardia and occasional postconversion pauses.      Past Medical History:   Diagnosis Date   • Arthritis     Osteoarthritis   • Cancer (CMS/HCC)     Stage I lobular left breast cancer   • DVT (deep venous thrombosis) (CMS/HCC)     Secondary to knee surgery   • Hyperlipidemia    • Hypertension    • Obesity        Past Surgical History:   Procedure Laterality Date   • BREAST BIOPSY Left 2014   • BREAST LUMPECTOMY W/ NEEDLE LOCALIZATION Left 2014   • HYSTERECTOMY     • TOTAL KNEE ARTHROPLASTY  2012    Left   • VENA CAVA FILTER INSERTION  2012    IVC filter          Prior to Admission medications    Medication Sig Start Date End Date Taking?  Authorizing Provider   amLODIPine (NORVASC) 10 MG tablet Take  by mouth daily. 5/13/14  Yes Levon Davis MD   anastrozole (ARIMIDEX) 1 MG tablet TAKE 1 TABLET BY MOUTH DAILY 4/11/19  Yes Angela Agustin MD   atorvastatin (LIPITOR) 40 MG tablet Take 80 mg by mouth. 5/13/14  Yes Levon Davis MD   Calcium Carb-Cholecalciferol 600-200 MG-UNIT tablet Take  by mouth daily. 5/13/14  Yes Levon Davis MD   ezetimibe (ZETIA) 10 MG tablet Take 10 mg by mouth Daily.   Yes Levon Davis MD   Krill Oil 1000 MG capsule Take  by mouth.   Yes Levon Davis MD   metoprolol succinate XL (TOPROL-XL) 100 MG 24 hr tablet Take  by mouth daily. 5/13/14  Yes Levon Davis MD   nitrofurantoin (MACRODANTIN) 100 MG capsule Take 100 mg by mouth 4 (Four) Times a Day.   Yes Levon Davis MD   promethazine-dextromethorphan (PROMETHAZINE-DM) 6.25-15 MG/5ML solution Take  by mouth 4 (Four) Times a Day As Needed for Cough.   Yes Levon Davis MD   rivaroxaban (XARELTO) 20 MG tablet Take  by mouth. 5/13/14  Yes Levon Davis MD       Allergies   Allergen Reactions   • No Known Drug Allergy Other (See Comments)     No known allergies       Social History     Socioeconomic History   • Marital status:      Spouse name: Bharathi   • Number of children: Not on file   • Years of education: Not on file   • Highest education level: Not on file   Occupational History     Employer: RETIRED   Tobacco Use   • Smoking status: Never Smoker   • Smokeless tobacco: Never Used   Substance and Sexual Activity   • Alcohol use: No   • Drug use: No   • Sexual activity: Defer       Family History   Problem Relation Age of Onset   • Kidney cancer Mother 78   • Clotting disorder Neg Hx        REVIEW OF SYSTEMS:   All systems reviewed.  Pertinent positives identified in HPI.  All other systems are negative.      Objective:     Vitals:    08/03/19 1054 08/03/19 1103 08/03/19 1120 08/03/19 1133   BP:   "109/78 112/83 121/76   Pulse:  100 105 82   Resp: 25      Temp: 98.9 °F (37.2 °C)      TempSrc: Oral      SpO2: 93% 94% 92% (!) 89%   Weight: 109 kg (239 lb 13.8 oz)      Height: 165.1 cm (65\")        Body mass index is 39.91 kg/m².   Temp:  [98.7 °F (37.1 °C)-98.9 °F (37.2 °C)] 98.9 °F (37.2 °C)  Heart Rate:  [] 82  Resp:  [15-25] 25  BP: (109-136)/(68-90) 121/76    Intake/Output Summary (Last 24 hours) at 8/3/2019 1340  Last data filed at 8/3/2019 1048  Gross per 24 hour   Intake 500 ml   Output --   Net 500 ml     Flowsheet Rows      First Filed Value   Admission Height  165.1 cm (65\") Documented at 08/03/2019 0905   Admission Weight  109 kg (241 lb) Documented at 08/03/2019 0905          General Appearance:    Alert, cooperative, in no acute distress   Head:    Normocephalic, without obvious abnormality, atraumatic       Neck:   No adenopathy, supple, no thyromegaly, no carotid bruit, no    JVD   Lungs:     Clear to auscultation bilaterally, no wheezes, rales, or     rhonchi    Heart:   Tachycardic, regular rhythm,  No murmur, rub, or gallop   Chest Wall:    No abnormalities observed   Abdomen:     Normal bowel sounds, soft, non-tender, non-distended,            no rebound tenderness   Extremities:   No cyanosis, clubbing, or edema   Pulses:   Pulses palpable and equal bilaterally   Skin:   No bleeding or rash       Neurologic:   Cranial nerves 2 - 12 grossly intact, sensation intact                                                                       Lab Review:     Results from last 7 days   Lab Units 08/03/19  0930   SODIUM mmol/L 136   POTASSIUM mmol/L 3.9   CHLORIDE mmol/L 98   CO2 mmol/L 22.3   BUN mg/dL 24*   CREATININE mg/dL 1.44*   CALCIUM mg/dL 9.2   BILIRUBIN mg/dL 0.8   ALK PHOS U/L 179*   ALT (SGPT) U/L 26   AST (SGOT) U/L 31   GLUCOSE mg/dL 192*     Results from last 7 days   Lab Units 08/03/19  0930   TROPONIN T ng/mL <0.010     Results from last 7 days   Lab Units 08/03/19  0930   WBC " 10*3/mm3 13.58*   HEMOGLOBIN g/dL 11.1*   HEMATOCRIT % 34.9   PLATELETS 10*3/mm3 220         Results from last 7 days   Lab Units 08/03/19  0930   MAGNESIUM mg/dL 2.2                   EKG 8/3/19      Admit EKG 8/3/19      I personally viewed and interpreted the patient's EKG/Telemetry data.        Assessment and Plan:   1.  Atrial flutter with a rapid ventricular rate  2.  Postconversion pauses  3.  Acute kidney injury  4.  Urinary tract infection: Complicated  5.  Lupus anticoagulant  6.  Chronic left lower extremity DVT  7.  History of breast cancer    -Difficult situation.  Patient has an indication for beta-blocker however this is not maintaining sinus rhythm and she is having prolonged postconversion pauses without syncope.    -With this patient there is always potential to maintain sinus rhythm and to avoid postconversion pauses however in many cases we do need to consider pacemaker.  It is not ideal to consider that at this time as the patient does have evidence of a complicated urinary tract infection.    -No additional beta-blocker today.  Start amiodarone drip.  If she tolerates this and is maintaining sinus and will restart half dose Toprol tomorrow.    Electrophysiology consultation on Monday.    Continue anticoagulation    Jong Perez MD  08/03/19  1:39 PM

## 2019-08-03 NOTE — ED NOTES
Pt arrives with c/o nausea and diarrhea x7 days and being diagnosed with a UTI on Thursday. Pt is currently on Macrobid for this. Patient appears extremely pale. A-fib on the monitor. Attempted straight catheterization without success, Dr. Scott aware.      Delfina Anderson, RN  08/03/19 4180

## 2019-08-03 NOTE — ED NOTES
Patient EKG shows pause of approximately 6 seconds. Patient pale and diaphoretic. Dr. Scott to bed side. Crash cart at bed side and pads placed on patient. Patient rhythm is now in atrial flutter from atrial fibrillation.      Delfina Anderson RN  08/03/19 0907

## 2019-08-03 NOTE — ED TRIAGE NOTES
Patient presents to er via private vehicle from home.  Patient was diagnosed with an UTI two days ago at PCP and given macrobid.  Patient reports n/v/d for five days and is worse today.

## 2019-08-03 NOTE — PLAN OF CARE
Problem: Patient Care Overview  Goal: Plan of Care Review  Outcome: Ongoing (interventions implemented as appropriate)   08/03/19 1135   Coping/Psychosocial   Plan of Care Reviewed With patient;daughter   Plan of Care Review   Progress improving

## 2019-08-04 ENCOUNTER — APPOINTMENT (OUTPATIENT)
Dept: CARDIOLOGY | Facility: HOSPITAL | Age: 79
End: 2019-08-04

## 2019-08-04 LAB
ASCENDING AORTA: 3 CM
BACTERIA BLD CULT: ABNORMAL
BH CV ECHO MEAS - ACS: 1.9 CM
BH CV ECHO MEAS - AO MAX PG (FULL): 9 MMHG
BH CV ECHO MEAS - AO MAX PG: 15.7 MMHG
BH CV ECHO MEAS - AO MEAN PG (FULL): 4 MMHG
BH CV ECHO MEAS - AO MEAN PG: 8 MMHG
BH CV ECHO MEAS - AO ROOT AREA (BSA CORRECTED): 1.8
BH CV ECHO MEAS - AO ROOT AREA: 11.3 CM^2
BH CV ECHO MEAS - AO ROOT DIAM: 3.8 CM
BH CV ECHO MEAS - AO V2 MAX: 198 CM/SEC
BH CV ECHO MEAS - AO V2 MEAN: 133 CM/SEC
BH CV ECHO MEAS - AO V2 VTI: 37.7 CM
BH CV ECHO MEAS - AVA(I,A): 1.8 CM^2
BH CV ECHO MEAS - AVA(I,D): 1.8 CM^2
BH CV ECHO MEAS - AVA(V,A): 1.8 CM^2
BH CV ECHO MEAS - AVA(V,D): 1.8 CM^2
BH CV ECHO MEAS - BSA(HAYCOCK): 2.3 M^2
BH CV ECHO MEAS - BSA: 2.1 M^2
BH CV ECHO MEAS - BZI_BMI: 39.8 KILOGRAMS/M^2
BH CV ECHO MEAS - BZI_METRIC_HEIGHT: 165.1 CM
BH CV ECHO MEAS - BZI_METRIC_WEIGHT: 108.4 KG
BH CV ECHO MEAS - EDV(TEICH): 95.4 ML
BH CV ECHO MEAS - EF(CUBED): 63.8 %
BH CV ECHO MEAS - EF(MOD-BP): 57 %
BH CV ECHO MEAS - EF(TEICH): 55.4 %
BH CV ECHO MEAS - ESV(TEICH): 42.5 ML
BH CV ECHO MEAS - FS: 28.7 %
BH CV ECHO MEAS - IVS/LVPW: 1.1
BH CV ECHO MEAS - IVSD: 1 CM
BH CV ECHO MEAS - LAT PEAK E' VEL: 10 CM/SEC
BH CV ECHO MEAS - LV MASS(C)D: 147.6 GRAMS
BH CV ECHO MEAS - LV MASS(C)DI: 69.2 GRAMS/M^2
BH CV ECHO MEAS - LV MAX PG: 6.7 MMHG
BH CV ECHO MEAS - LV MEAN PG: 4 MMHG
BH CV ECHO MEAS - LV V1 MAX: 129 CM/SEC
BH CV ECHO MEAS - LV V1 MEAN: 93.2 CM/SEC
BH CV ECHO MEAS - LV V1 VTI: 23.6 CM
BH CV ECHO MEAS - LVIDD: 4.6 CM
BH CV ECHO MEAS - LVIDS: 3.3 CM
BH CV ECHO MEAS - LVOT AREA (M): 2.8 CM^2
BH CV ECHO MEAS - LVOT AREA: 2.8 CM^2
BH CV ECHO MEAS - LVOT DIAM: 1.9 CM
BH CV ECHO MEAS - LVPWD: 0.9 CM
BH CV ECHO MEAS - MED PEAK E' VEL: 6 CM/SEC
BH CV ECHO MEAS - MR MAX PG: 71.9 MMHG
BH CV ECHO MEAS - MR MAX VEL: 424 CM/SEC
BH CV ECHO MEAS - MV DEC SLOPE: 754 CM/SEC^2
BH CV ECHO MEAS - MV DEC TIME: 0.14 SEC
BH CV ECHO MEAS - MV E MAX VEL: 108 CM/SEC
BH CV ECHO MEAS - MV MAX PG: 6.7 MMHG
BH CV ECHO MEAS - MV MEAN PG: 4 MMHG
BH CV ECHO MEAS - MV P1/2T MAX VEL: 112 CM/SEC
BH CV ECHO MEAS - MV P1/2T: 43.5 MSEC
BH CV ECHO MEAS - MV V2 MAX: 129 CM/SEC
BH CV ECHO MEAS - MV V2 MEAN: 85.4 CM/SEC
BH CV ECHO MEAS - MV V2 VTI: 23.9 CM
BH CV ECHO MEAS - MVA P1/2T LCG: 2 CM^2
BH CV ECHO MEAS - MVA(P1/2T): 5.1 CM^2
BH CV ECHO MEAS - MVA(VTI): 2.8 CM^2
BH CV ECHO MEAS - PA ACC TIME: 0.11 SEC
BH CV ECHO MEAS - PA MAX PG (FULL): 5.2 MMHG
BH CV ECHO MEAS - PA MAX PG: 7.5 MMHG
BH CV ECHO MEAS - PA PR(ACCEL): 31.3 MMHG
BH CV ECHO MEAS - PA V2 MAX: 137 CM/SEC
BH CV ECHO MEAS - PULM DIAS VEL: 41.5 CM/SEC
BH CV ECHO MEAS - PULM S/D: 1.3
BH CV ECHO MEAS - PULM SYS VEL: 51.9 CM/SEC
BH CV ECHO MEAS - PVA(V,A): 1.8 CM^2
BH CV ECHO MEAS - PVA(V,D): 1.8 CM^2
BH CV ECHO MEAS - QP/QS: 0.64
BH CV ECHO MEAS - RAP SYSTOLE: 3 MMHG
BH CV ECHO MEAS - RV MAX PG: 2.4 MMHG
BH CV ECHO MEAS - RV MEAN PG: 1 MMHG
BH CV ECHO MEAS - RV V1 MAX: 76.7 CM/SEC
BH CV ECHO MEAS - RV V1 MEAN: 51 CM/SEC
BH CV ECHO MEAS - RV V1 VTI: 13.6 CM
BH CV ECHO MEAS - RVOT AREA: 3.1 CM^2
BH CV ECHO MEAS - RVOT DIAM: 2 CM
BH CV ECHO MEAS - RVSP: 41 MMHG
BH CV ECHO MEAS - SI(AO): 200.4 ML/M^2
BH CV ECHO MEAS - SI(CUBED): 28.3 ML/M^2
BH CV ECHO MEAS - SI(LVOT): 31.4 ML/M^2
BH CV ECHO MEAS - SI(TEICH): 24.8 ML/M^2
BH CV ECHO MEAS - SV(AO): 427.6 ML
BH CV ECHO MEAS - SV(CUBED): 60.5 ML
BH CV ECHO MEAS - SV(LVOT): 66.9 ML
BH CV ECHO MEAS - SV(RVOT): 42.7 ML
BH CV ECHO MEAS - SV(TEICH): 52.8 ML
BH CV ECHO MEAS - TAPSE (>1.6): 1.6 CM2
BH CV ECHO MEAS - TR MAX VEL: 308 CM/SEC
BH CV ECHO MEASUREMENTS AVERAGE E/E' RATIO: 13.5
BH CV LOW VAS LEFT COMMON FEMORAL SPONT: 1
BH CV LOW VAS LEFT MID FEMORAL SPONT: 1
BH CV LOW VAS LEFT POPLITEAL SPONT: 1
BH CV LOW VAS LEFT POSTERIOR TIBIAL VESSEL: 1
BH CV LOW VAS LEFT PROXIMAL FEMORAL SPONT: 1
BH CV LOWER VASCULAR LEFT COMMON FEMORAL AUGMENT: NORMAL
BH CV LOWER VASCULAR LEFT COMMON FEMORAL COMPETENT: NORMAL
BH CV LOWER VASCULAR LEFT COMMON FEMORAL COMPRESS: NORMAL
BH CV LOWER VASCULAR LEFT COMMON FEMORAL PHASIC: NORMAL
BH CV LOWER VASCULAR LEFT COMMON FEMORAL SPONT: NORMAL
BH CV LOWER VASCULAR LEFT COMMON FEMORAL THROMBUS: NORMAL
BH CV LOWER VASCULAR LEFT DISTAL FEMORAL COMPRESS: NORMAL
BH CV LOWER VASCULAR LEFT GASTRONEMIUS COMPRESS: NORMAL
BH CV LOWER VASCULAR LEFT GREATER SAPH AK COMPRESS: NORMAL
BH CV LOWER VASCULAR LEFT GREATER SAPH BK COMPRESS: NORMAL
BH CV LOWER VASCULAR LEFT MID FEMORAL AUGMENT: NORMAL
BH CV LOWER VASCULAR LEFT MID FEMORAL COMPETENT: NORMAL
BH CV LOWER VASCULAR LEFT MID FEMORAL COMPRESS: NORMAL
BH CV LOWER VASCULAR LEFT MID FEMORAL PHASIC: NORMAL
BH CV LOWER VASCULAR LEFT MID FEMORAL SPONT: NORMAL
BH CV LOWER VASCULAR LEFT PERONEAL COMPRESS: NORMAL
BH CV LOWER VASCULAR LEFT POPLITEAL AUGMENT: NORMAL
BH CV LOWER VASCULAR LEFT POPLITEAL COMPETENT: NORMAL
BH CV LOWER VASCULAR LEFT POPLITEAL COMPRESS: NORMAL
BH CV LOWER VASCULAR LEFT POPLITEAL PHASIC: NORMAL
BH CV LOWER VASCULAR LEFT POPLITEAL SPONT: NORMAL
BH CV LOWER VASCULAR LEFT POSTERIOR TIBIAL COMPRESS: NORMAL
BH CV LOWER VASCULAR LEFT POSTERIOR TIBIAL THROMBUS: NORMAL
BH CV LOWER VASCULAR LEFT PROXIMAL FEMORAL COMPRESS: NORMAL
BH CV LOWER VASCULAR LEFT PROXIMAL FEMORAL THROMBUS: NORMAL
BH CV LOWER VASCULAR LEFT SAPHENOFEMORAL JUNCTION COMPRESS: NORMAL
BH CV LOWER VASCULAR RIGHT COMMON FEMORAL AUGMENT: NORMAL
BH CV LOWER VASCULAR RIGHT COMMON FEMORAL COMPETENT: NORMAL
BH CV LOWER VASCULAR RIGHT COMMON FEMORAL COMPRESS: NORMAL
BH CV LOWER VASCULAR RIGHT COMMON FEMORAL PHASIC: NORMAL
BH CV LOWER VASCULAR RIGHT COMMON FEMORAL SPONT: NORMAL
BH CV LOWER VASCULAR RIGHT DISTAL FEMORAL COMPRESS: NORMAL
BH CV LOWER VASCULAR RIGHT GASTRONEMIUS COMPRESS: NORMAL
BH CV LOWER VASCULAR RIGHT GREATER SAPH AK COMPRESS: NORMAL
BH CV LOWER VASCULAR RIGHT GREATER SAPH BK COMPRESS: NORMAL
BH CV LOWER VASCULAR RIGHT MID FEMORAL AUGMENT: NORMAL
BH CV LOWER VASCULAR RIGHT MID FEMORAL COMPETENT: NORMAL
BH CV LOWER VASCULAR RIGHT MID FEMORAL COMPRESS: NORMAL
BH CV LOWER VASCULAR RIGHT MID FEMORAL PHASIC: NORMAL
BH CV LOWER VASCULAR RIGHT MID FEMORAL SPONT: NORMAL
BH CV LOWER VASCULAR RIGHT PERONEAL COMPRESS: NORMAL
BH CV LOWER VASCULAR RIGHT POPLITEAL AUGMENT: NORMAL
BH CV LOWER VASCULAR RIGHT POPLITEAL COMPETENT: NORMAL
BH CV LOWER VASCULAR RIGHT POPLITEAL COMPRESS: NORMAL
BH CV LOWER VASCULAR RIGHT POPLITEAL PHASIC: NORMAL
BH CV LOWER VASCULAR RIGHT POPLITEAL SPONT: NORMAL
BH CV LOWER VASCULAR RIGHT POSTERIOR TIBIAL COMPRESS: NORMAL
BH CV LOWER VASCULAR RIGHT PROXIMAL FEMORAL COMPRESS: NORMAL
BH CV LOWER VASCULAR RIGHT SAPHENOFEMORAL JUNCTION COMPRESS: NORMAL
BH CV XLRA - RV BASE: 3 CM
BH CV XLRA - TDI S': 14 CM/SEC
GLUCOSE BLDC GLUCOMTR-MCNC: 111 MG/DL (ref 70–130)
GLUCOSE BLDC GLUCOMTR-MCNC: 118 MG/DL (ref 70–130)
GLUCOSE BLDC GLUCOMTR-MCNC: 124 MG/DL (ref 70–130)
GLUCOSE BLDC GLUCOMTR-MCNC: 126 MG/DL (ref 70–130)
GLUCOSE BLDC GLUCOMTR-MCNC: 138 MG/DL (ref 70–130)
LEFT ATRIUM VOLUME INDEX: 34 ML/M2
MAXIMAL PREDICTED HEART RATE: 143 BPM
SINUS: 3.3 CM
STJ: 2.9 CM
STRESS TARGET HR: 122 BPM

## 2019-08-04 PROCEDURE — 99232 SBSQ HOSP IP/OBS MODERATE 35: CPT | Performed by: INTERNAL MEDICINE

## 2019-08-04 PROCEDURE — 93306 TTE W/DOPPLER COMPLETE: CPT

## 2019-08-04 PROCEDURE — 93010 ELECTROCARDIOGRAM REPORT: CPT | Performed by: INTERNAL MEDICINE

## 2019-08-04 PROCEDURE — 82962 GLUCOSE BLOOD TEST: CPT

## 2019-08-04 PROCEDURE — 93306 TTE W/DOPPLER COMPLETE: CPT | Performed by: INTERNAL MEDICINE

## 2019-08-04 PROCEDURE — 94799 UNLISTED PULMONARY SVC/PX: CPT

## 2019-08-04 PROCEDURE — 25010000002 PERFLUTREN (DEFINITY) 8.476 MG IN SODIUM CHLORIDE 0.9 % 10 ML INJECTION: Performed by: INTERNAL MEDICINE

## 2019-08-04 PROCEDURE — 93005 ELECTROCARDIOGRAM TRACING: CPT | Performed by: INTERNAL MEDICINE

## 2019-08-04 PROCEDURE — 25010000002 AMIODARONE IN DEXTROSE 5% 360-4.14 MG/200ML-% SOLUTION: Performed by: INTERNAL MEDICINE

## 2019-08-04 PROCEDURE — 25010000002 CEFTRIAXONE PER 250 MG: Performed by: INTERNAL MEDICINE

## 2019-08-04 PROCEDURE — 25010000002 PROMETHAZINE PER 50 MG: Performed by: INTERNAL MEDICINE

## 2019-08-04 RX ORDER — PROMETHAZINE HYDROCHLORIDE 25 MG/ML
25 INJECTION, SOLUTION INTRAMUSCULAR; INTRAVENOUS EVERY 8 HOURS PRN
Status: DISCONTINUED | OUTPATIENT
Start: 2019-08-04 | End: 2019-08-08

## 2019-08-04 RX ADMIN — PERFLUTREN 1 ML: 6.52 INJECTION, SUSPENSION INTRAVENOUS at 09:41

## 2019-08-04 RX ADMIN — RIVAROXABAN 20 MG: 20 TABLET, FILM COATED ORAL at 17:17

## 2019-08-04 RX ADMIN — METOPROLOL TARTRATE 25 MG: 25 TABLET ORAL at 10:52

## 2019-08-04 RX ADMIN — SODIUM CHLORIDE, PRESERVATIVE FREE 3 ML: 5 INJECTION INTRAVENOUS at 08:51

## 2019-08-04 RX ADMIN — PROMETHAZINE HYDROCHLORIDE 25 MG: 25 INJECTION INTRAMUSCULAR; INTRAVENOUS at 13:20

## 2019-08-04 RX ADMIN — METOPROLOL TARTRATE 25 MG: 25 TABLET ORAL at 20:55

## 2019-08-04 RX ADMIN — CEFTRIAXONE SODIUM 1 G: 1 INJECTION, SOLUTION INTRAVENOUS at 14:44

## 2019-08-04 RX ADMIN — AMIODARONE HYDROCHLORIDE 0.5 MG/MIN: 1.8 INJECTION, SOLUTION INTRAVENOUS at 08:51

## 2019-08-04 RX ADMIN — AMIODARONE HYDROCHLORIDE 0.5 MG/MIN: 1.8 INJECTION, SOLUTION INTRAVENOUS at 22:03

## 2019-08-04 NOTE — PROGRESS NOTES
"                                              LOS: 1 day   Patient Care Team:  Micheal Dixon as PCP - General (Cardiology)  Provider, No Known as PCP - Family Medicine  Angela Agustin MD as PCP - Claims Attributed  Angela Agustin MD as Consulting Physician (Hematology and Oncology)  Jorge Luis Pichardo MD as Referring Physician (Family Medicine)    Chief Complaint:  F/up A. fib with RVR, sinus pauses, ICU care, sepsis, UTI    Subjective   Interval History  Has dry heaves.  No nausea or vomiting.  Started on amiodarone today and currently back and normal sinus rhythm though had some prolonged pauses overnight.    REVIEW OF SYSTEMS:   CARDIOVASCULAR: No chest pain, chest pressure or chest discomfort. No reported palpitations or edema.   RESPIRATORY: Still having persistent dry cough.  No shortness of breath at rest but has a history of difficulty breathing on ambulation.  GASTROINTESTINAL: Nausea but no vomiting or diarrhea. No abdominal pain or blood.   HEMATOLOGIC: No bleeding or bruising.     Ventilator/Non-Invasive Ventilation Settings (From admission, onward)    None                Physical Exam:     Vital Signs  Temp:  [98.5 °F (36.9 °C)-100.5 °F (38.1 °C)] 98.5 °F (36.9 °C)  Heart Rate:  [] 98  Resp:  [19] 19  BP: ()/() 115/84    Intake/Output Summary (Last 24 hours) at 8/4/2019 1438  Last data filed at 8/4/2019 0600  Gross per 24 hour   Intake 351.86 ml   Output 1250 ml   Net -898.14 ml     Flowsheet Rows      First Filed Value   Admission Height  165.1 cm (65\") Documented at 08/03/2019 0905   Admission Weight  109 kg (241 lb) Documented at 08/03/2019 0905          General Appearance:    Alert, cooperative, in no acute distress   ENMT:  Mallampati score 3, moist mucous membrane   Eyes: Pupils equals and reactive to light. EOMI.  Injected conjunctivo-   Neck:   Large. Trachea midline. No thyromegaly.   Lungs:    Bilateral basal crackles.  No wheezing.  Nonlabored breathing.    Heart:    " Regular rhythm and normal rate, normal S1 and S2, no            murmur   Skin:    No abnormalities observed.  Normal skin turgor   Abdomen:     Obese. Soft. No tenderness. No HSM.  Positive bowel sounds   Neuro:   Conscious, alert, oriented x3. Strength 5/5 in upper and lower ext   Extremities:   Moves all extremities well, no edema, no cyanosis, no             Redness          Results Review:        Results from last 7 days   Lab Units 08/03/19  0930   SODIUM mmol/L 136   POTASSIUM mmol/L 3.9   CHLORIDE mmol/L 98   CO2 mmol/L 22.3   BUN mg/dL 24*   CREATININE mg/dL 1.44*   GLUCOSE mg/dL 192*   CALCIUM mg/dL 9.2     Results from last 7 days   Lab Units 08/03/19 0930   TROPONIN T ng/mL <0.010     Results from last 7 days   Lab Units 08/03/19 0930   WBC 10*3/mm3 13.58*   HEMOGLOBIN g/dL 11.1*   HEMATOCRIT % 34.9   PLATELETS 10*3/mm3 220               I reviewed the patient's new clinical results.  I personally viewed and interpreted the patient's CXR        Medication Review:     ceftriaxone 1 g Intravenous Q24H   metoprolol tartrate 25 mg Oral Q12H   rivaroxaban 20 mg Oral Daily With Dinner   sodium chloride 250 mL Intravenous Once   sodium chloride 3 mL Intravenous Q12H         amiodarone 0.5 mg/min Last Rate: 0.5 mg/min (08/04/19 0851)   sodium chloride 125 mL/hr Last Rate: 125 mL/hr (08/03/19 0931)         Assessment   1. New A fib with RVR on presentation, converted to NSR  2. Postconversion pauses  3. ROSE, 2ary to dehydration  4. Suspected UTI complicated by Mild left hydronephrosis  5. Sepsis, secondary #4  6. E Coli bacteremia, 2ary to # 4  7. Diarrhea, reportedly, non so far during her hospitalisation  8. Nausea  9. Anemia  10. Sick euthyroid/elevated TSH  11. Chronic cough  12. Mild colonic diverticulosis  13. Lupus anticoagulant  14. Extensive left LE DVT post knee surgery in 2012, failed warfarin on chronic Xarelto s/p IVC filter 2012  15. Hx of Breast ca, lobular carcinoma, s/p chemotherapy 2014, in  remission      Plan   · Rocephin for bacteremia awaiting sensitivity. Recheck blood culture in AM to ensure sterility  · Repeat BMP in AM  · Continue IV hydration  · Amiodarone for A. fib per cardiology.  Low-dose beta-blocker started as well  · Continue monitoring in the coronary care unit due to sinus pauses and risk for severe bradycardia arrhythmia.  · Consult EP cardiology  · Start Phenergan as needed for nausea  · Continue Xarelto  · Check CT of chest due to chronic cough.   · Urine culture  · Upgraded diet as tolerated    Discussed with patient, family and nursing staff    Time>35 min face to face >1/2 directed toward counseling and coordination of care    Lety Hood MD  08/04/19  2:38 PM          This note was dictated utilizing Dragon dictation

## 2019-08-04 NOTE — PLAN OF CARE
Problem: Patient Care Overview  Goal: Plan of Care Review  Outcome: Ongoing (interventions implemented as appropriate)   08/04/19 1800   Coping/Psychosocial   Plan of Care Reviewed With patient;spouse;daughter   Plan of Care Review   Progress improving   OTHER   Outcome Summary Pt remains on amio gtt. and started on low dose metoprolol; converted to sinus rhythm about 12:44,HR 70s remained in sinus rhythm until about 1700, back to afib, converted again about 1900 to sinus rhythm HR 70s.will continue to monitor.

## 2019-08-04 NOTE — PLAN OF CARE
Problem: Patient Care Overview  Goal: Plan of Care Review  Outcome: Ongoing (interventions implemented as appropriate)   08/04/19 0608   Coping/Psychosocial   Plan of Care Reviewed With patient   OTHER   Outcome Summary Pt remains in CCU on amio gtt. Heart rate increases with any movement but deceases to baseline of 100-110's quickly. Rhythm remains Afib. Pt has no complains of Chest pain. Purewick in place working well, 800 UOP. CTM.

## 2019-08-04 NOTE — PROGRESS NOTES
"Patient Care Team:  Micheal Dixon as PCP - General (Cardiology)  Provider, No Known as PCP - Family Medicine  Angela Agustin MD as PCP - Claims Attributed  Angela Agustin MD as Consulting Physician (Hematology and Oncology)  Jorge Luis Pichardo MD as Referring Physician (Family Medicine)    Chief Complaint: Follow-up paroxysmal atrial fibrillation with postconversion pauses    Interval History:   Maintaining atrial fibrillation with a mildly increased ventricular rate.  Continuing on anticoagulation and amiodarone therapy.  Noted to have E. coli bacteremia     Objective   Vital Signs  Temp:  [98.5 °F (36.9 °C)-100.5 °F (38.1 °C)] 98.5 °F (36.9 °C)  Heart Rate:  [] 100  Resp:  [19-25] 19  BP: ()/() 127/80    Intake/Output Summary (Last 24 hours) at 8/4/2019 0940  Last data filed at 8/4/2019 0600  Gross per 24 hour   Intake 851.86 ml   Output 1250 ml   Net -398.14 ml     Flowsheet Rows      First Filed Value   Admission Height  165.1 cm (65\") Documented at 08/03/2019 0905   Admission Weight  109 kg (241 lb) Documented at 08/03/2019 0905          General Appearance:    Alert, cooperative, in no acute distress   Head:    Normocephalic, without obvious abnormality, atraumatic       Neck:   No adenopathy, supple, no thyromegaly, no carotid bruit, no    JVD   Lungs:     Clear to auscultation bilaterally, no wheezes, rales, or     rhonchi    Heart:   Mild tachycardia irregular rhythm,  No murmur, rub, or gallop   Chest Wall:    No abnormalities observed   Abdomen:     Normal bowel sounds, soft, non-tender, non-distended,            no rebound tenderness   Extremities:   No cyanosis, clubbing, or edema   Pulses:   Pulses palpable and equal bilaterally   Skin:   No bleeding or rash       Neurologic:   Cranial nerves 2 - 12 grossly intact, sensation intact             ceftriaxone 1 g Intravenous Q24H   rivaroxaban 20 mg Oral Daily With Dinner   sodium chloride 250 mL Intravenous Once   sodium chloride 3 mL " Intravenous Q12H         amiodarone 0.5 mg/min Last Rate: 0.5 mg/min (08/04/19 0851)   sodium chloride 125 mL/hr Last Rate: 125 mL/hr (08/03/19 0931)       Results Review:    Results from last 7 days   Lab Units 08/03/19 0930   SODIUM mmol/L 136   POTASSIUM mmol/L 3.9   CHLORIDE mmol/L 98   CO2 mmol/L 22.3   BUN mg/dL 24*   CREATININE mg/dL 1.44*   GLUCOSE mg/dL 192*   CALCIUM mg/dL 9.2     Results from last 7 days   Lab Units 08/03/19 0930   TROPONIN T ng/mL <0.010     Results from last 7 days   Lab Units 08/03/19 0930   WBC 10*3/mm3 13.58*   HEMOGLOBIN g/dL 11.1*   HEMATOCRIT % 34.9   PLATELETS 10*3/mm3 220             Results from last 7 days   Lab Units 08/03/19 0930   MAGNESIUM mg/dL 2.2         @LABRCNT(bnp)@  I reviewed the patient's new clinical results.  I personally viewed and interpreted the patient's EKG/Telemetry data            Assessment/Plan   1.  Atrial fibrillation with a rapid ventricular rate  2.  Postconversion pauses  3.  Acute kidney injury  4.  Urinary tract infection: Complicated  5.  Lupus anticoagulant  6.  Chronic left lower extremity DVT  7.  History of breast cancer     -Difficult situation.  Patient has an indication for beta-blocker however this is not maintaining sinus rhythm and she is having prolonged postconversion pauses without syncope.     -With this patient there is always potential to maintain sinus rhythm and to avoid postconversion pauses however in many cases we do need to consider pacemaker. We should  Not consider that at this time as the patient does have evidence of a complicated urinary tract infection and bacteremia     -Continue amiodarone drip.  I will start back on a low-dose of metoprolol tartrate and see if she tolerates this with rate control and no evidence of significant pauses     Electrophysiology consultation on Monday.     Continue anticoagulation

## 2019-08-05 ENCOUNTER — APPOINTMENT (OUTPATIENT)
Dept: CT IMAGING | Facility: HOSPITAL | Age: 79
End: 2019-08-05

## 2019-08-05 LAB
ANION GAP SERPL CALCULATED.3IONS-SCNC: 12.8 MMOL/L (ref 5–15)
BASOPHILS # BLD AUTO: 0.05 10*3/MM3 (ref 0–0.2)
BASOPHILS NFR BLD AUTO: 0.5 % (ref 0–1.5)
BUN BLD-MCNC: 13 MG/DL (ref 8–23)
BUN/CREAT SERPL: 11 (ref 7–25)
CALCIUM SPEC-SCNC: 8.7 MG/DL (ref 8.6–10.5)
CHLORIDE SERPL-SCNC: 96 MMOL/L (ref 98–107)
CO2 SERPL-SCNC: 24.2 MMOL/L (ref 22–29)
CREAT BLD-MCNC: 1.18 MG/DL (ref 0.57–1)
DEPRECATED RDW RBC AUTO: 47.4 FL (ref 37–54)
EOSINOPHIL # BLD AUTO: 0.23 10*3/MM3 (ref 0–0.4)
EOSINOPHIL NFR BLD AUTO: 2.5 % (ref 0.3–6.2)
ERYTHROCYTE [DISTWIDTH] IN BLOOD BY AUTOMATED COUNT: 13.9 % (ref 12.3–15.4)
GFR SERPL CREATININE-BSD FRML MDRD: 44 ML/MIN/1.73
GLUCOSE BLD-MCNC: 128 MG/DL (ref 65–99)
GLUCOSE BLDC GLUCOMTR-MCNC: 125 MG/DL (ref 70–130)
HCT VFR BLD AUTO: 32 % (ref 34–46.6)
HGB BLD-MCNC: 10.2 G/DL (ref 12–15.9)
IMM GRANULOCYTES # BLD AUTO: 0.06 10*3/MM3 (ref 0–0.05)
IMM GRANULOCYTES NFR BLD AUTO: 0.6 % (ref 0–0.5)
LYMPHOCYTES # BLD AUTO: 1.06 10*3/MM3 (ref 0.7–3.1)
LYMPHOCYTES NFR BLD AUTO: 11.3 % (ref 19.6–45.3)
MCH RBC QN AUTO: 29.7 PG (ref 26.6–33)
MCHC RBC AUTO-ENTMCNC: 31.9 G/DL (ref 31.5–35.7)
MCV RBC AUTO: 93 FL (ref 79–97)
MONOCYTES # BLD AUTO: 1.34 10*3/MM3 (ref 0.1–0.9)
MONOCYTES NFR BLD AUTO: 14.3 % (ref 5–12)
NEUTROPHILS # BLD AUTO: 6.62 10*3/MM3 (ref 1.7–7)
NEUTROPHILS NFR BLD AUTO: 70.8 % (ref 42.7–76)
NRBC BLD AUTO-RTO: 0.1 /100 WBC (ref 0–0.2)
PLATELET # BLD AUTO: 217 10*3/MM3 (ref 140–450)
PMV BLD AUTO: 10.8 FL (ref 6–12)
POTASSIUM BLD-SCNC: 3.3 MMOL/L (ref 3.5–5.2)
RBC # BLD AUTO: 3.44 10*6/MM3 (ref 3.77–5.28)
SODIUM BLD-SCNC: 133 MMOL/L (ref 136–145)
WBC NRBC COR # BLD: 9.36 10*3/MM3 (ref 3.4–10.8)

## 2019-08-05 PROCEDURE — 99232 SBSQ HOSP IP/OBS MODERATE 35: CPT | Performed by: INTERNAL MEDICINE

## 2019-08-05 PROCEDURE — 82962 GLUCOSE BLOOD TEST: CPT

## 2019-08-05 PROCEDURE — 85025 COMPLETE CBC W/AUTO DIFF WBC: CPT | Performed by: INTERNAL MEDICINE

## 2019-08-05 PROCEDURE — 25010000003 CEFTRIAXONE PER 250 MG: Performed by: INTERNAL MEDICINE

## 2019-08-05 PROCEDURE — 25010000002 AMIODARONE IN DEXTROSE 5% 360-4.14 MG/200ML-% SOLUTION: Performed by: INTERNAL MEDICINE

## 2019-08-05 PROCEDURE — 63710000001 DIPHENHYDRAMINE PER 50 MG: Performed by: INTERNAL MEDICINE

## 2019-08-05 PROCEDURE — 99221 1ST HOSP IP/OBS SF/LOW 40: CPT | Performed by: NURSE PRACTITIONER

## 2019-08-05 PROCEDURE — 93005 ELECTROCARDIOGRAM TRACING: CPT | Performed by: INTERNAL MEDICINE

## 2019-08-05 PROCEDURE — 80048 BASIC METABOLIC PNL TOTAL CA: CPT | Performed by: INTERNAL MEDICINE

## 2019-08-05 PROCEDURE — 93010 ELECTROCARDIOGRAM REPORT: CPT | Performed by: INTERNAL MEDICINE

## 2019-08-05 PROCEDURE — 71250 CT THORAX DX C-: CPT

## 2019-08-05 RX ORDER — POTASSIUM CHLORIDE 1.5 G/1.77G
40 POWDER, FOR SOLUTION ORAL AS NEEDED
Status: DISCONTINUED | OUTPATIENT
Start: 2019-08-05 | End: 2019-08-08

## 2019-08-05 RX ORDER — DIPHENHYDRAMINE HCL 25 MG
25 CAPSULE ORAL NIGHTLY PRN
Status: DISCONTINUED | OUTPATIENT
Start: 2019-08-05 | End: 2019-08-08

## 2019-08-05 RX ORDER — POTASSIUM CHLORIDE 7.45 MG/ML
10 INJECTION INTRAVENOUS
Status: DISCONTINUED | OUTPATIENT
Start: 2019-08-05 | End: 2019-08-08

## 2019-08-05 RX ORDER — ACETAMINOPHEN 325 MG/1
325 TABLET ORAL NIGHTLY PRN
Status: DISCONTINUED | OUTPATIENT
Start: 2019-08-05 | End: 2019-08-08

## 2019-08-05 RX ORDER — POTASSIUM CHLORIDE 750 MG/1
40 CAPSULE, EXTENDED RELEASE ORAL AS NEEDED
Status: DISCONTINUED | OUTPATIENT
Start: 2019-08-05 | End: 2019-08-08

## 2019-08-05 RX ORDER — CEFTRIAXONE SODIUM 2 G/50ML
2 INJECTION, SOLUTION INTRAVENOUS EVERY 24 HOURS
Status: DISCONTINUED | OUTPATIENT
Start: 2019-08-05 | End: 2019-08-07

## 2019-08-05 RX ADMIN — POTASSIUM CHLORIDE 40 MEQ: 750 CAPSULE, EXTENDED RELEASE ORAL at 06:51

## 2019-08-05 RX ADMIN — METOPROLOL TARTRATE 25 MG: 25 TABLET ORAL at 09:14

## 2019-08-05 RX ADMIN — METOPROLOL TARTRATE 25 MG: 25 TABLET ORAL at 21:04

## 2019-08-05 RX ADMIN — AMIODARONE HYDROCHLORIDE 0.5 MG/MIN: 1.8 INJECTION, SOLUTION INTRAVENOUS at 22:27

## 2019-08-05 RX ADMIN — ACETAMINOPHEN 325 MG: 325 TABLET, FILM COATED ORAL at 21:06

## 2019-08-05 RX ADMIN — AMIODARONE HYDROCHLORIDE 0.5 MG/MIN: 1.8 INJECTION, SOLUTION INTRAVENOUS at 09:14

## 2019-08-05 RX ADMIN — POTASSIUM CHLORIDE 40 MEQ: 750 CAPSULE, EXTENDED RELEASE ORAL at 13:01

## 2019-08-05 RX ADMIN — RIVAROXABAN 20 MG: 20 TABLET, FILM COATED ORAL at 17:53

## 2019-08-05 RX ADMIN — CEFTRIAXONE SODIUM 2 G: 2 INJECTION, SOLUTION INTRAVENOUS at 12:43

## 2019-08-05 RX ADMIN — SODIUM CHLORIDE, PRESERVATIVE FREE 3 ML: 5 INJECTION INTRAVENOUS at 09:14

## 2019-08-05 RX ADMIN — DIPHENHYDRAMINE HYDROCHLORIDE 25 MG: 25 CAPSULE ORAL at 21:06

## 2019-08-05 NOTE — PLAN OF CARE
"Problem: Patient Care Overview  Goal: Plan of Care Review  Outcome: Ongoing (interventions implemented as appropriate)   08/05/19 8334   Coping/Psychosocial   Plan of Care Reviewed With patient;spouse   OTHER   Outcome Summary Pt. remains in CCU, sinus rhythm most of shift. One episode of atrial flutter this am with exertion. No pause noted after conversion. Pt. weak and tired. Intm. c/o nausea or feeling \"queasy\". Relieved with sips of cold water. Pt. ambulated to chair this evening with no changes in rhythm. Amio and metoprolol remain. Continuing to monitor.        Problem: Tissue Perfusion, Ineffective Peripheral (Adult)  Goal: Identify Related Risk Factors and Signs and Symptoms  Outcome: Outcome(s) achieved Date Met: 08/05/19    Goal: Adequate Tissue Perfusion  Outcome: Ongoing (interventions implemented as appropriate)      Problem: Skin Injury Risk (Adult)  Goal: Identify Related Risk Factors and Signs and Symptoms  Outcome: Outcome(s) achieved Date Met: 08/05/19    Goal: Skin Health and Integrity  Outcome: Ongoing (interventions implemented as appropriate)      Problem: Fall Risk (Adult)  Goal: Identify Related Risk Factors and Signs and Symptoms  Outcome: Outcome(s) achieved Date Met: 08/05/19    Goal: Absence of Fall  Outcome: Ongoing (interventions implemented as appropriate)      Problem: Arrhythmia/Dysrhythmia (Symptomatic) (Adult)  Goal: Signs and Symptoms of Listed Potential Problems Will be Absent, Minimized or Managed (Arrhythmia/Dysrhythmia)  Outcome: Ongoing (interventions implemented as appropriate)      Problem: Infection, Risk/Actual (Adult)  Goal: Identify Related Risk Factors and Signs and Symptoms  Outcome: Outcome(s) achieved Date Met: 08/05/19    Goal: Infection Prevention/Resolution  Outcome: Ongoing (interventions implemented as appropriate)        "

## 2019-08-05 NOTE — PLAN OF CARE
Problem: Patient Care Overview  Goal: Plan of Care Review  Outcome: Ongoing (interventions implemented as appropriate)   08/05/19 0625   Coping/Psychosocial   Plan of Care Reviewed With patient   Plan of Care Review   Progress improving   OTHER   Outcome Summary Pt remains in sinus overnight with occasional PVC/PAC. Amio gtt remains and PO metoprolol given. HR low 61, highest in mid 70s. SBP >110. 1400 UO over past 24H. Pt tolerating full liquid diet overnight. Will CTM.        Problem: Tissue Perfusion, Ineffective Peripheral (Adult)  Goal: Identify Related Risk Factors and Signs and Symptoms  Outcome: Ongoing (interventions implemented as appropriate)    Goal: Adequate Tissue Perfusion  Outcome: Ongoing (interventions implemented as appropriate)      Problem: Skin Injury Risk (Adult)  Goal: Identify Related Risk Factors and Signs and Symptoms  Outcome: Ongoing (interventions implemented as appropriate)    Goal: Skin Health and Integrity  Outcome: Ongoing (interventions implemented as appropriate)      Problem: Fall Risk (Adult)  Goal: Identify Related Risk Factors and Signs and Symptoms  Outcome: Ongoing (interventions implemented as appropriate)    Goal: Absence of Fall  Outcome: Ongoing (interventions implemented as appropriate)

## 2019-08-05 NOTE — PROGRESS NOTES
"                                              LOS: 2 days   Patient Care Team:  Micheal Dixon as PCP - General (Cardiology)  Provider, No Known as PCP - Family Medicine  Angela Agustin MD as PCP - Claims Attributed  Angela Agustin MD as Consulting Physician (Hematology and Oncology)  Jorge Luis Pichardo MD as Referring Physician (Family Medicine)    Chief Complaint:  F/up A. fib with RVR, sinus pauses, ICU care, sepsis, UTI    Subjective   Interval History  Cough improved today.  She has been tolerating  full liquid diet.  No more sinus pauses.  She is in normal sinus rhythm and intermittently goes into A. fib RVR    REVIEW OF SYSTEMS:   CARDIOVASCULAR: No chest pain, chest pressure or chest discomfort. No reported palpitations or edema.   RESPIRATORY: Improved cough.  Has difficulty breathing on activities.  GASTROINTESTINAL: No nausea, vomiting or diarrhea. No abdominal pain or blood.   HEMATOLOGIC: No bleeding or bruising.     Ventilator/Non-Invasive Ventilation Settings (From admission, onward)    None                Physical Exam:     Vital Signs  Temp:  [98 °F (36.7 °C)-98.7 °F (37.1 °C)] 98.3 °F (36.8 °C)  Heart Rate:  [] 74  BP: (115-139)/(60-85) 137/85    Intake/Output Summary (Last 24 hours) at 8/5/2019 1648  Last data filed at 8/5/2019 1637  Gross per 24 hour   Intake 1366.7 ml   Output 2550 ml   Net -1183.3 ml     Flowsheet Rows      First Filed Value   Admission Height  165.1 cm (65\") Documented at 08/03/2019 0905   Admission Weight  109 kg (241 lb) Documented at 08/03/2019 0905        No change in PE  General Appearance:    Alert, cooperative, in no acute distress   ENMT:  Mallampati score 3, moist mucous membrane   Eyes: Pupils equals and reactive to light. EOMI.  Injected conjunctivo-   Neck:   Large. Trachea midline. No thyromegaly.   Lungs:    Bilateral basal crackles.  No wheezing.  Nonlabored breathing.    Heart:    Regular rhythm and normal rate, normal S1 and S2, no            murmur "   Skin:    No abnormalities observed.  Normal skin turgor   Abdomen:     Obese. Soft. No tenderness. No HSM.  Positive bowel sounds   Neuro:   Conscious, alert, oriented x3. Strength 5/5 in upper and lower ext   Extremities:   Moves all extremities well, no edema, no cyanosis, no             Redness          Results Review:        Results from last 7 days   Lab Units 08/05/19 0449 08/03/19  0930   SODIUM mmol/L 133* 136   POTASSIUM mmol/L 3.3* 3.9   CHLORIDE mmol/L 96* 98   CO2 mmol/L 24.2 22.3   BUN mg/dL 13 24*   CREATININE mg/dL 1.18* 1.44*   GLUCOSE mg/dL 128* 192*   CALCIUM mg/dL 8.7 9.2     Results from last 7 days   Lab Units 08/03/19  0930   TROPONIN T ng/mL <0.010     Results from last 7 days   Lab Units 08/05/19  0449 08/03/19  0930   WBC 10*3/mm3 9.36 13.58*   HEMOGLOBIN g/dL 10.2* 11.1*   HEMATOCRIT % 32.0* 34.9   PLATELETS 10*3/mm3 217 220               I reviewed the patient's new clinical results.  I personally viewed and interpreted the patient's CXR        Medication Review:     ceftriaxone 2 g Intravenous Q24H   metoprolol tartrate 25 mg Oral Q12H   rivaroxaban 20 mg Oral Daily With Dinner   sodium chloride 250 mL Intravenous Once   sodium chloride 3 mL Intravenous Q12H         amiodarone 0.5 mg/min Last Rate: 0.5 mg/min (08/05/19 0914)     Diagnostic imaging:  I personally and Independently reviewed and interpreted the following images  CT chest dated 8/5/2019: I agree with the radiologist interpretation    Assessment   1. New A fib with RVR on presentation, converted to NSR  2. Postconversion pauses  3. ROSE, 2ary to dehydration, improving  4. E. coli UTI complicated by Mild left hydronephrosis  5. Sepsis, secondary #4  6. E Coli bacteremia, 2ary to # 4  7. Diarrhea, reportedly, non so far during her hospitalisation  8. Nausea  9. Anemia  10. Sick euthyroid/elevated TSH  11. Chronic cough  12. Mild colonic diverticulosis  13. Lupus anticoagulant  14. Extensive left LE DVT post knee surgery in  2012, failed warfarin on chronic Xarelto s/p IVC filter 2012  15. Hx of Breast ca, lobular carcinoma, s/p chemotherapy 2014, in remission  16. Abnormal CT chest with bilateral subpleural infiltrates.  Also possible narrowing of the left upper lobe bronchus  17. Left breast nodule, incidental on CT chest  18. Hyponatremia  19. Hypokalemia      Plan   · Rocephin for bacteremia awaiting sensitivity.  Increase the dose to 2 g daily for bacteremia.  Recheck blood culture tomorrow to ensure stability  · Amiodarone for A. fib per cardiology.  Low-dose beta-blocker started as well  · Consult EP cardiology  · Upgrade diet to regular  · Continue Xarelto  · Needs mammography in the setting of known breast nodule and perhaps biopsy.  This can be addressed as outpatient  · Repeat CT chest in 1 month with high-resolution images due to the interstitial infiltrates and also to evaluate the endo bronchial narrowing in the left upper lobe..  Check PFT as well.  She may need a bronchoscopy.  · Replace potassium    Discussed with patient, family and nursing staff  Transfer to floor if okay with cardiology      Lety Hood MD  08/05/19  4:48 PM          This note was dictated utilizing Dragon dictation

## 2019-08-05 NOTE — PROGRESS NOTES
Discharge Planning Assessment  [facility]     Patient Name: Dorota Ferrer  MRN: [id number]  Today's Date: 8/5/2019    Admit Date: 8/3/2019    Discharge Needs Assessment     Row Name 08/05/19 1559       Living Environment    Current Living Arrangements  home/apartment/condo    Primary Care Provided by  self    Provides Primary Care For  no one    Family Caregiver if Needed  spouse    Able to Return to Prior Arrangements  yes       Resource/Environmental Concerns    Resource/Environmental Concerns  none    Transportation Concerns  car, none       Transition Planning    Patient/Family Anticipates Transition to  home with family    Patient/Family Anticipated Services at Transition  none    Transportation Anticipated  family or friend will provide       Discharge Needs Assessment    Concerns to be Addressed  no discharge needs identified    Equipment Currently Used at Home  none    Anticipated Changes Related to Illness  none    Equipment Needed After Discharge  none        Discharge Plan     Row Name 08/05/19 1603       Plan    Plan  Home with spouse  Denies needs    Plan Comments  IMM noted  CCP spoke to patient to discuss discharge planning.  CCP role explained.  Face sheet verified.  Her  Bharathi [phone] is her emergency contact.  Patient lives in an house with her .   She uses no DME to ambulate.   She is  independent with ADLs. She has a no history of Home Health.  She has not been to rehab the past.    Plan is Home . Pt states that her 2 daughter help her  if needed   CCP following        Destination      No service coordination in this encounter.      Durable Medical Equipment      No service coordination in this encounter.      Dialysis/Infusion      No service coordination in this encounter.      Home Medical Care      No service coordination in this encounter.      Therapy      No service coordination in this encounter.      Community Resources      No service coordination in this  encounter.          Demographic Summary    No documentation.       Functional Status    No documentation.       Psychosocial    No documentation.       Abuse/Neglect    No documentation.       Legal    No documentation.       Substance Abuse    No documentation.       Patient Forms    No documentation.           Naheed De La Torre RN

## 2019-08-05 NOTE — CONSULTS
Electrophysiology Hospital Consult            Patient Name: Dorota Ferrer  Age/Sex: 79 y.o. female  : 1940  MRN: [id number]    Date of Admission: 8/3/2019  Date of Encounter Visit: 19  Encounter Provider: AMERICA Gustafson  Referring Provider: Lety Hood MD  Place of Service: [facility] CARDIOLOGY  Patient Care Team:  Micheal Dixon as PCP - General (Cardiology)  Provider, No Known as PCP - Family Medicine  Angela Agustin MD as PCP - Claims Attributed  Angela Agustin MD as Consulting Physician (Hematology and Oncology)  Jorge Luis Pichardo MD as Referring Physician (Family Medicine)      Subjective:   EP Consultation for: Paroxysmal atrial fib/flutter with conversion pauses, sick sinus syndrome    Chief Complaint: Weakness and near syncope    History of Present Illness:  Dorota Ferrer is a 79 y.o. female patient with no prior cardiac history other than hypertension and hyperlipidemia.  She is also been treated for breast cancer and is on rivaroxaban chronically for a DVT.  She presented to the emergency room on 8/3/2019 with complaints of generalized weakness, lightheadedness, abdominal pain and fever for about 3 days prior to admission.  She was recently diagnosed with UTI and was being treated with nitrofurantoin.  In the emergency room she was noted to be in atrial flutter and had approximately a 4-second conversion pause.  She was on 100 mg of metoprolol succinate prior to admission for treatment of her hypertension.    She is currently being treated for E.Coli UTI complicated by mild left hydronephrosis, sepsis, E. Coli bacteremia.     She continues to have paroxysmal atrial fib/flutter with very short conversion pauses.  She was started on IV amiodarone and beta-blocker had been held but trying restarting a low-dose this morning.  EP has been consulted for assistance with management and consideration of possible pacemaker.    She denies chest pain  or dyspnea. She had not been up out of bed yet but they are planning to get her up this afternoon.     Past Medical History:  Past Medical History:   Diagnosis Date   • Arthritis     Osteoarthritis   • Cancer (CMS/HCC) 2014    Stage I lobular left breast cancer   • DVT (deep venous thrombosis) (CMS/[facility]) 2012    Secondary to knee surgery   • Hyperlipidemia    • Hypertension    • Obesity        Past Surgical History:   Procedure Laterality Date   • BREAST BIOPSY Left 05/2014   • BREAST LUMPECTOMY W/ NEEDLE LOCALIZATION Left 06/26/2014   • HYSTERECTOMY     • TOTAL KNEE ARTHROPLASTY  07/2012    Left   • VENA CAVA FILTER INSERTION  09/2012    IVC filter        Home Medications:   Medications Prior to Admission   Medication Sig Dispense Refill Last Dose   • amLODIPine (NORVASC) 10 MG tablet Take  by mouth daily.   8/2/2019 at Unknown time   • anastrozole (ARIMIDEX) 1 MG tablet TAKE 1 TABLET BY MOUTH DAILY 90 tablet 1 8/2/2019 at Unknown time   • atorvastatin (LIPITOR) 40 MG tablet Take 80 mg by mouth.   8/2/2019 at Unknown time   • Calcium Carb-Cholecalciferol 600-200 MG-UNIT tablet Take  by mouth daily.   8/2/2019 at Unknown time   • ezetimibe (ZETIA) 10 MG tablet Take 10 mg by mouth Daily.   8/2/2019 at Unknown time   • Krill Oil 1000 MG capsule Take  by mouth.   8/2/2019 at Unknown time   • metoprolol succinate XL (TOPROL-XL) 100 MG 24 hr tablet Take  by mouth daily.   8/2/2019 at Unknown time   • nitrofurantoin (MACRODANTIN) 100 MG capsule Take 100 mg by mouth 4 (Four) Times a Day.   8/2/2019 at Unknown time   • promethazine-dextromethorphan (PROMETHAZINE-DM) 6.25-15 MG/5ML solution Take  by mouth 4 (Four) Times a Day As Needed for Cough.   8/2/2019 at Unknown time   • rivaroxaban (XARELTO) 20 MG tablet Take  by mouth.   8/2/2019 at Unknown time       Allergies:  Allergies   Allergen Reactions   • No Known Drug Allergy Other (See Comments)     No known allergies       Past Social History:  Social History      Socioeconomic History   • Marital status:      Spouse name: Bharathi   • Number of children: Not on file   • Years of education: Not on file   • Highest education level: Not on file   Occupational History     Employer: RETIRED   Tobacco Use   • Smoking status: Never Smoker   • Smokeless tobacco: Never Used   Substance and Sexual Activity   • Alcohol use: No   • Drug use: No   • Sexual activity: Defer       Past Family History:  Family History   Problem Relation Age of Onset   • Kidney cancer Mother 78   • Clotting disorder Neg Hx        Review of Systems: All systems reviewed. Pertinent positives identified in HPI. All other systems are negative.     14 point ROS was performed and is negative except as outlined in HPI.     Objective:     Objective:  Vital Signs (last 24 hours)       08/04 0700  -  08/05 0659 08/05 0700  -  08/05 1358   Most Recent    Temp (°F) 98.3 -  98.7    98 -  98.3     98.3 (36.8)    Heart Rate 64 -  112    62 -  74     74    BP 91/75 -  139/77    119/68 -  137/85     137/85    SpO2 (%) (!)89 -  96    92 -  96     94        Temp:  [98 °F (36.7 °C)-98.7 °F (37.1 °C)] 98.3 °F (36.8 °C)  Heart Rate:  [] 74  BP: (115-139)/(60-85) 137/85  Body mass index is 38.34 kg/m².        Physical Exam:     General Appearance: No acute distress, well developed and well nourished.   Eyes: Conjunctiva and lids: No erythema, swelling, or discharge. Sclera non-icteric.   HENT: Atraumatic, normocephalic. External eyes, ears, and nose normal.   Respiratory: No signs of respiratory distress. Respiration rhythm and depth normal.   Clear to auscultation. No rales, crackles, rhonchi, or wheezing auscultated.   Cardiovascular:  Heart Rate and Rhythm: Normal, Heart Sounds: Normal S1 and S2. No S3 or S4 noted.  Murmurs: No murmurs noted. No rubs, thrills, or gallops.   Arterial Pulses: Posterior tibialis and dorsalis pedis pulses normal.   Lower Extremities: No edema noted.  Gastrointestinal:  Abdomen soft,  non-distended, non-tender.  Musculoskeletal: Normal movement of extremities  Skin: Warm and dry.   Psychiatric: Patient alert and oriented to person, place, and time. Speech and behavior appropriate. Normal mood and affect.    Labs:   Lab Review:     Results from last 7 days   Lab Units 19  0449 19  0930   SODIUM mmol/L 133* 136   POTASSIUM mmol/L 3.3* 3.9   CHLORIDE mmol/L 96* 98   CO2 mmol/L 24.2 22.3   BUN mg/dL 13 24*   CREATININE mg/dL 1.18* 1.44*   GLUCOSE mg/dL 128* 192*   CALCIUM mg/dL 8.7 9.2   AST (SGOT) U/L  --  31   ALT (SGPT) U/L  --  26     Results from last 7 days   Lab Units 19  0930   TROPONIN T ng/mL <0.010     Results from last 7 days   Lab Units 19  0449   WBC 10*3/mm3 9.36   HEMOGLOBIN g/dL 10.2*   HEMATOCRIT % 32.0*   PLATELETS 10*3/mm3 217             Results from last 7 days   Lab Units 19  0930   MAGNESIUM mg/dL 2.2                 Results from last 7 days   Lab Units 19  0930   TSH mIU/mL 12.100*         Imagin/4/2019 Echo:     Interpretation Summary     · Calculated right ventricular systolic pressure from tricuspid regurgitation is 41 mmHg.  · Left ventricular systolic function is normal.  · There is calcification of the aortic valve.  · Left atrial cavity size is mildly dilated.  · Calculated EF = 57%.          EKG:                             I personally viewed and interpreted the patient's EKG/Telemetry tracings.    Assessment:       Sick sinus syndrome with tachycardia (CMS/HCC)        Plan:     Dr. Salmeron and I saw the patient this afternoon.  She is having more prolonged periods in sinus rhythm and when she is in A. fib and converted back to sinus rhythm her most recent pauses have been very short and asymptomatic.  Would continue with the IV amio for now and we will see how she does with the addition of low-dose beta-blocker.  Would like to avoid pacemaker implantation at this time given her current treatment for infection, will  continue to follow along with you.    Thank you for allowing me to participate in the care of Dorota Ferrer. Feel free to contact me directly with any further questions or concerns.    AMERICA Gustafson  [facility]  08/05/19  1:58 PM

## 2019-08-05 NOTE — PROGRESS NOTES
"Patient Care Team:  Micheal Dixon as PCP - General (Cardiology)  Provider, No Known as PCP - Family Medicine  Angela Agustin MD as PCP - Claims Attributed  Angela Agustin MD as Consulting Physician (Hematology and Oncology)  Jorge Luis Pichardo MD as Referring Physician (Family Medicine)    Chief Complaint: Follow-up paroxysmal atrial fibrillation, postconversion pauses.    Interval History: Sinus rhythm today.  No palpitations      Objective   Vital Signs  Temp:  [98 °F (36.7 °C)-98.7 °F (37.1 °C)] 98 °F (36.7 °C)  Heart Rate:  [] 73  BP: (106-139)/(54-84) 132/76    Intake/Output Summary (Last 24 hours) at 8/5/2019 0839  Last data filed at 8/5/2019 0444  Gross per 24 hour   Intake 1867 ml   Output 1400 ml   Net 467 ml     Flowsheet Rows      First Filed Value   Admission Height  165.1 cm (65\") Documented at 08/03/2019 0905   Admission Weight  109 kg (241 lb) Documented at 08/03/2019 0905          General Appearance:    Alert, cooperative, in no acute distress   Head:    Normocephalic, without obvious abnormality, atraumatic       Neck:   No adenopathy, supple, no thyromegaly, no carotid bruit, no    JVD   Lungs:     Clear to auscultation bilaterally, no wheezes, rales, or     rhonchi    Heart:    Normal rate, regular rhythm,  No murmur, rub, or gallop   Chest Wall:    No abnormalities observed   Abdomen:     Normal bowel sounds, soft, non-tender, non-distended,            no rebound tenderness   Extremities:   No cyanosis, clubbing, or edema   Pulses:   Pulses palpable and equal bilaterally   Skin:   No bleeding or rash       Neurologic:   Cranial nerves 2 - 12 grossly intact, sensation intact             ceftriaxone 1 g Intravenous Q24H   metoprolol tartrate 25 mg Oral Q12H   rivaroxaban 20 mg Oral Daily With Dinner   sodium chloride 250 mL Intravenous Once   sodium chloride 3 mL Intravenous Q12H         amiodarone 0.5 mg/min Last Rate: 0.5 mg/min (08/04/19 2203)   sodium chloride 125 mL/hr Last Rate: 125 " mL/hr (08/03/19 0931)       Results Review:    Results from last 7 days   Lab Units 08/05/19  0449   SODIUM mmol/L 133*   POTASSIUM mmol/L 3.3*   CHLORIDE mmol/L 96*   CO2 mmol/L 24.2   BUN mg/dL 13   CREATININE mg/dL 1.18*   GLUCOSE mg/dL 128*   CALCIUM mg/dL 8.7     Results from last 7 days   Lab Units 08/03/19  0930   TROPONIN T ng/mL <0.010     Results from last 7 days   Lab Units 08/05/19  0449   WBC 10*3/mm3 9.36   HEMOGLOBIN g/dL 10.2*   HEMATOCRIT % 32.0*   PLATELETS 10*3/mm3 217             Results from last 7 days   Lab Units 08/03/19  0930   MAGNESIUM mg/dL 2.2         @LABNT(bnp)@  I reviewed the patient's new clinical results.  I personally viewed and interpreted the patient's EKG/Telemetry data            Assessment/Plan   1.  Atrial fibrillation with a rapid ventricular rate: Currently sinus rhythm  2.  Postconversion pauses  3.  Acute kidney injury  4.  Urinary tract infection: Complicated  5.  Lupus anticoagulant  6.  Chronic left lower extremity DVT  7.  History of breast cancer     -Difficult situation.  Patient has an indication for beta-blocker however this was not maintaining sinus rhythm and she was having prolonged postconversion pauses without syncope.     -With this patient there is always potential to maintain sinus rhythm and to avoid postconversion pauses however in many cases we do need to consider pacemaker. We should  Not consider that at this time as the patient does have evidence of a complicated urinary tract infection and bacteremia      -Continue amiodarone drip.    Tolerating low-dose metoprolol     Electrophysiology consultation  today

## 2019-08-06 PROBLEM — D64.9 ANEMIA: Status: ACTIVE | Noted: 2019-08-06

## 2019-08-06 LAB
ANION GAP SERPL CALCULATED.3IONS-SCNC: 11.8 MMOL/L (ref 5–15)
BACTERIA SPEC AEROBE CULT: ABNORMAL
BUN BLD-MCNC: 12 MG/DL (ref 8–23)
BUN/CREAT SERPL: 10.3 (ref 7–25)
CALCIUM SPEC-SCNC: 8.7 MG/DL (ref 8.6–10.5)
CHLORIDE SERPL-SCNC: 102 MMOL/L (ref 98–107)
CO2 SERPL-SCNC: 25.2 MMOL/L (ref 22–29)
CREAT BLD-MCNC: 1.17 MG/DL (ref 0.57–1)
GFR SERPL CREATININE-BSD FRML MDRD: 45 ML/MIN/1.73
GLUCOSE BLD-MCNC: 129 MG/DL (ref 65–99)
GRAM STN SPEC: ABNORMAL
POTASSIUM BLD-SCNC: 4.1 MMOL/L (ref 3.5–5.2)
SODIUM BLD-SCNC: 139 MMOL/L (ref 136–145)

## 2019-08-06 PROCEDURE — 63710000001 DIPHENHYDRAMINE PER 50 MG: Performed by: INTERNAL MEDICINE

## 2019-08-06 PROCEDURE — 25010000002 AMIODARONE IN DEXTROSE 5% 360-4.14 MG/200ML-% SOLUTION: Performed by: INTERNAL MEDICINE

## 2019-08-06 PROCEDURE — 25010000003 CEFTRIAXONE PER 250 MG: Performed by: INTERNAL MEDICINE

## 2019-08-06 PROCEDURE — 93005 ELECTROCARDIOGRAM TRACING: CPT | Performed by: INTERNAL MEDICINE

## 2019-08-06 PROCEDURE — 99222 1ST HOSP IP/OBS MODERATE 55: CPT | Performed by: INTERNAL MEDICINE

## 2019-08-06 PROCEDURE — 93010 ELECTROCARDIOGRAM REPORT: CPT | Performed by: INTERNAL MEDICINE

## 2019-08-06 PROCEDURE — 80048 BASIC METABOLIC PNL TOTAL CA: CPT | Performed by: INTERNAL MEDICINE

## 2019-08-06 PROCEDURE — 87040 BLOOD CULTURE FOR BACTERIA: CPT | Performed by: INTERNAL MEDICINE

## 2019-08-06 PROCEDURE — 99233 SBSQ HOSP IP/OBS HIGH 50: CPT | Performed by: NURSE PRACTITIONER

## 2019-08-06 RX ORDER — AMIODARONE HYDROCHLORIDE 200 MG/1
400 TABLET ORAL EVERY 12 HOURS SCHEDULED
Status: DISCONTINUED | OUTPATIENT
Start: 2019-08-06 | End: 2019-08-07

## 2019-08-06 RX ADMIN — DIPHENHYDRAMINE HYDROCHLORIDE 25 MG: 25 CAPSULE ORAL at 22:01

## 2019-08-06 RX ADMIN — CEFTRIAXONE SODIUM 2 G: 2 INJECTION, SOLUTION INTRAVENOUS at 13:36

## 2019-08-06 RX ADMIN — AMIODARONE HYDROCHLORIDE 0.5 MG/MIN: 1.8 INJECTION, SOLUTION INTRAVENOUS at 09:21

## 2019-08-06 RX ADMIN — METOPROLOL TARTRATE 25 MG: 25 TABLET ORAL at 08:20

## 2019-08-06 RX ADMIN — ACETAMINOPHEN 325 MG: 325 TABLET, FILM COATED ORAL at 22:01

## 2019-08-06 RX ADMIN — METOPROLOL TARTRATE 25 MG: 25 TABLET ORAL at 20:22

## 2019-08-06 RX ADMIN — RIVAROXABAN 20 MG: 20 TABLET, FILM COATED ORAL at 17:47

## 2019-08-06 RX ADMIN — AMIODARONE HYDROCHLORIDE 400 MG: 200 TABLET ORAL at 20:22

## 2019-08-06 RX ADMIN — SODIUM CHLORIDE, PRESERVATIVE FREE 3 ML: 5 INJECTION INTRAVENOUS at 08:21

## 2019-08-06 NOTE — CONSULTS
.     REASON FOR CONSULTATION:     Provide an opinion on any further workup or treatment of left breast nodule on chest CT scan.                             REQUESTING PHYSICIAN: Lety Hood MD     RECORDS OBTAINED:  Records of the patients history including those obtained from the referring provider were reviewed and summarized in detail.    HISTORY OF PRESENT ILLNESS:  The patient is a 79 y.o. year old female who is a patient having medical history of breast cancer, lobular carcinoma, Stage I, for which she has been on Arimidex since 2014. She also has history of recurrent DVT with lupus anticoagulant on lifelong anticoagulation with Xarelto who is not responding to Coumadin therapy previously. Patient was admitted this time for urosepsis with positive E. coli in the blood starting with urinary tract infection according to her daughter. The patient has improved clinically according to her daughter.     We are consulted to see the patient for a left breast nodule detected on her CT scan examination of the chest obtained on 08/05/2019, yesterday. Her left breast nodule is known to us and we have been following and monitoring this for years. Her most recent mammogram study at the [facility] obtained on 06/25/2019 reported a stable left upper-outer quadrant nodule described as stable, egg shell rim calcification. No changes compared to mammogram study dating back to 11/2015. The patient herself is also aware of this and reports there is no change of the size of this nodule as well as the skin overlapped with that.     The CT scan for chest on 08/05/2019 and CT for abdomen and pelvis on 08/03/2019 showed no apparent evidence of metastatic disease.     Patient also has new onset of atrial fibrillation with rapid ventricular response and also has been by Cardiologist. Patient currently is on Lopressor together with IV Rocephin. She is also continued on Xarelto. Her home medication Arimidex is on  hold. Patient is on amiodarone IV drip.           Past Medical History:   Diagnosis Date   • Arthritis     Osteoarthritis   • Cancer (CMS/HCC)     Stage I lobular left breast cancer   • DVT (deep venous thrombosis) (CMS/HCC)     Secondary to knee surgery   • Hyperlipidemia    • Hypertension    • Obesity      Past Surgical History:   Procedure Laterality Date   • BREAST BIOPSY Left 2014   • BREAST LUMPECTOMY W/ NEEDLE LOCALIZATION Left 2014   • HYSTERECTOMY     • TOTAL KNEE ARTHROPLASTY  2012    Left   • VENA CAVA FILTER INSERTION  2012    IVC filter        HEMATOLOGIC/ONCOLOGIC HISTORY:  (History from previous dates can be found in the separate document.)  See our clinic note on 2019 by AMERICA Garza.    MEDICATIONS    Current Facility-Administered Medications:   •  acetaminophen (TYLENOL) tablet 325 mg, 325 mg, Oral, Nightly PRN, 325 mg at 19 **AND** diphenhydrAMINE (BENADRYL) capsule 25 mg, 25 mg, Oral, Nightly PRN, Lety Hood MD, 25 mg at 19  •  [COMPLETED] amiodarone in dextrose 5% (NEXTERONE) loading dose 150mg/100mL, 150 mg, Intravenous, Once, 150 mg at 19 1448 **FOLLOWED BY** [] amiodarone (NEXTERONE) 360 mg/200 mL (1.8 mg/mL) infusion, 1 mg/min, Intravenous, Continuous, Last Rate: 33.3 mL/hr at 19 1506, 1 mg/min at 19 1506 **FOLLOWED BY** amiodarone (NEXTERONE) 360 mg/200 mL (1.8 mg/mL) infusion, 0.5 mg/min, Intravenous, Continuous, Jong Perez MD, Last Rate: 16.67 mL/hr at 19 0921, 0.5 mg/min at 19 0921  •  cefTRIAXone (ROCEPHIN) IVPB 2 g, 2 g, Intravenous, Q24H, Lety Hood MD, Last Rate: 100 mL/hr at 19 1336, 2 g at 19 1336  •  metoprolol tartrate (LOPRESSOR) tablet 25 mg, 25 mg, Oral, Q12H, Jong Perez MD, 25 mg at 19 0820  •  potassium chloride (MICRO-K) CR capsule 40 mEq, 40 mEq, Oral, PRN, 40 mEq at 19 1301 **OR** potassium chloride (KLOR-CON) packet 40 mEq,  40 mEq, Oral, PRN **OR** potassium chloride 10 mEq in 100 mL IVPB, 10 mEq, Intravenous, Q1H PRN, Jesse Alfonso MD  •  promethazine (PHENERGAN) injection 25 mg, 25 mg, Intravenous, Q8H PRN, Lety Hood MD, 25 mg at 08/04/19 1320  •  rivaroxaban (XARELTO) tablet 20 mg, 20 mg, Oral, Daily With Dinner, Lety Hood MD, 20 mg at 08/05/19 1753  •  sodium chloride 0.9 % bolus 250 mL, 250 mL, Intravenous, Once, Angelina Scott MD, Stopped at 08/03/19 1048  •  sodium chloride 0.9 % flush 10 mL, 10 mL, Intravenous, PRN, Angelina Scott MD  •  sodium chloride 0.9 % flush 3 mL, 3 mL, Intravenous, Q12H, Lety Hood MD, 3 mL at 08/06/19 0821  •  sodium chloride 0.9 % flush 3-10 mL, 3-10 mL, Intravenous, PRN, Lety Hood MD    ALLERGIES:     Allergies   Allergen Reactions   • No Known Drug Allergy Other (See Comments)     No known allergies       SOCIAL HISTORY:       Social History     Socioeconomic History   • Marital status:      Spouse name: Bharatih   • Number of children: Not on file   • Years of education: Not on file   • Highest education level: Not on file   Occupational History     Employer: RETIRED   Tobacco Use   • Smoking status: Never Smoker   • Smokeless tobacco: Never Used   Substance and Sexual Activity   • Alcohol use: No   • Drug use: No   • Sexual activity: Defer         FAMILY HISTORY:  Family History   Problem Relation Age of Onset   • Kidney cancer Mother 78   • Clotting disorder Neg Hx        REVIEW OF SYSTEMS:  Review of Systems   Constitutional: Positive for chills, diaphoresis, fatigue and fever.   HENT: Negative for facial swelling and mouth sores.    Eyes: Negative for visual disturbance.   Respiratory: Positive for cough and shortness of breath.    Cardiovascular: Positive for palpitations. Negative for chest pain and leg swelling.   Gastrointestinal: Negative for blood in stool, nausea and vomiting.   Endocrine: Negative for cold intolerance.   Genitourinary: Positive for dysuria and  frequency.   Musculoskeletal: Positive for arthralgias. Negative for joint swelling.   Skin: Negative.    Neurological: Negative for dizziness and headaches.   Hematological: Negative for adenopathy. Does not bruise/bleed easily.              Vitals:    08/06/19 1003 08/06/19 1103 08/06/19 1205 08/06/19 1343   BP: 134/79 127/74  137/82   BP Location:       Patient Position:       Pulse: 75 72 79    Resp:       Temp:  98.1 °F (36.7 °C)     TempSrc:  Oral     SpO2: 90% 95% 94% 94%   Weight:       Height:         Current Status 7/2/2019   ECOG score 0      PHYSICAL EXAM:      CONSTITUTIONAL:  Vital signs reviewed.  Well-developed well-nourished female lying in bed, not in acute distress but it looks weak.    EYES:  Conjunctiva and lids unremarkable.  PERRLA  EARS,NOSE,MOUTH,THROAT:  Nose appear unremarkable.  Lips appear unremarkable.  RESPIRATORY:  Normal respiratory effort.  Lungs clear to auscultation bilaterally.  CARDIOVASCULAR: Irregular rhythm, rate controlled.  Normal S1, S2.  No murmurs rubs or gallops.  No significant lower extremity edema.  GASTROINTESTINAL: Abdomen appears unremarkable.  Nontender.  No hepatomegaly.  No splenomegaly.  LYMPHATIC:  No cervical, supraclavicular, axillary lymphadenopathy.  MUSCULOSKELETAL:  Unremarkable digits/nails.  No cyanosis or clubbing.  SKIN:  Warm.  No rashes.   BREAST: Left breast upper outer quadrant right above the nipple he has a nodule about 2.5 cm in diameter, no tenderness.  There is no redness or swelling of the skin.  PSYCHIATRIC:  Normal judgment and insight.  Normal mood and affect.      RECENT LABS:    Results from last 7 days   Lab Units 08/05/19  0449 08/03/19  0930   WBC 10*3/mm3 9.36 13.58*   HEMOGLOBIN g/dL 10.2* 11.1*   HEMATOCRIT % 32.0* 34.9   PLATELETS 10*3/mm3 217 220     Results from last 7 days   Lab Units 08/06/19  0421 08/05/19  0449 08/03/19  0930   SODIUM mmol/L 139 133* 136   POTASSIUM mmol/L 4.1 3.3* 3.9   CHLORIDE mmol/L 102 96* 98   CO2  mmol/L 25.2 24.2 22.3   BUN mg/dL 12 13 24*   CREATININE mg/dL 1.17* 1.18* 1.44*   CALCIUM mg/dL 8.7 8.7 9.2   BILIRUBIN mg/dL  --   --  0.8   ALK PHOS U/L  --   --  179*   ALT (SGPT) U/L  --   --  26   AST (SGOT) U/L  --   --  31   GLUCOSE mg/dL 129* 128* 192*         Assessment/Plan    1.  Sepsis secondary to urinary infection, and positive bacteria in blood.  Continue IV antibiotics.    2.  Atrial fibrillation new onset, with RVR.  Followed by cardiology with active IV amiodarone.     3.  Recurrent DVT with positive lupus anticoagulant, patient will continue Xarelto anticoagulation.    4.  Mild anemia.  This is secondary to her sepsis.  She had normal hemoglobin a month ago.  Expecting she will recover.    5.  Left breast nodule.   I discussed with the patient and her daughter today that her left breast nodule is stable. Most recent mammogram study in late 06/2019 showed stable condition compared to previous several years studies. Physical examination today showed a left upper outer quadrant nodule with no skin edema or redness. I recommended continued clinical observation with annual mammogram study. She has appointment with us about every 6 months. Most recent was on 07/02/2019.       PLAN:   1. Continue current treatment for urinary tract infection/sepsis.   2. Continue treatment for new onset of atrial fibrillation.   3. Continue Xarelto anticoagulation for her recurrent DVT and positive lupus anticoagulant.   4. Continue clinic observation of left breast nodule. No intervention or further examination needed at this point.     Discussed with the patient and her daughter. They voiced understanding.     I discussed with her nurse today.       Thank you for letting us specific care of this patient.  We will sign off.        KENISHA PAVON M.D., Ph.D.    8/6/2019    Dictated using Dragon Dictation.

## 2019-08-06 NOTE — PROGRESS NOTES
"                                              LOS: 3 days   Patient Care Team:  Micheal Dixon as PCP - General (Cardiology)  Provider, No Known as PCP - Family Medicine  Angela Agustin MD as PCP - Claims Attributed  Angela Agustin MD as Consulting Physician (Hematology and Oncology)  Jorge Luis Pichardo MD as Referring Physician (Family Medicine)    Chief Complaint:  F/up A. fib with RVR, sinus pauses, ICU care, sepsis, UTI    Subjective   Interval History  No more sinus pauses.  Has minimal cough without sputum.  She is on amiodarone drip.    REVIEW OF SYSTEMS:   CARDIOVASCULAR: No chest pain, chest pressure or chest discomfort. No reported palpitations or edema.   RESPIRATORY: Improved cough.  Has difficulty breathing on activities.  GASTROINTESTINAL: No nausea, vomiting or diarrhea. No abdominal pain or blood.   HEMATOLOGIC: No bleeding or bruising.     Ventilator/Non-Invasive Ventilation Settings (From admission, onward)    None                Physical Exam:     Vital Signs  Temp:  [98 °F (36.7 °C)-99.7 °F (37.6 °C)] 98.1 °F (36.7 °C)  Heart Rate:  [62-85] 79  Resp:  [16] 16  BP: (110-143)/(56-94) 127/74    Intake/Output Summary (Last 24 hours) at 8/6/2019 1302  Last data filed at 8/6/2019 0821  Gross per 24 hour   Intake 610.4 ml   Output 1150 ml   Net -539.6 ml     Flowsheet Rows      First Filed Value   Admission Height  165.1 cm (65\") Documented at 08/03/2019 0905   Admission Weight  109 kg (241 lb) Documented at 08/03/2019 0905        No change in PE  General Appearance:    Alert, cooperative, in no acute distress   ENMT:  Mallampati score 3, moist mucous membrane   Eyes: Pupils equals and reactive to light. EOMI.  Injected conjunctivo-   Neck:   Large. Trachea midline. No thyromegaly.   Lungs:    Bilateral basal crackles.  No wheezing.  Nonlabored breathing.    Heart:    Regular rhythm and normal rate, normal S1 and S2, no            murmur   Skin:    No abnormalities observed.  Normal skin turgor "   Abdomen:     Obese. Soft. No tenderness. No HSM.  Positive bowel sounds   Neuro:   Conscious, alert, oriented x3. Strength 5/5 in upper and lower ext   Extremities:   Moves all extremities well, no edema, no cyanosis, no             Redness          Results Review:        Results from last 7 days   Lab Units 08/06/19  0421 08/05/19  0449 08/03/19  0930   SODIUM mmol/L 139 133* 136   POTASSIUM mmol/L 4.1 3.3* 3.9   CHLORIDE mmol/L 102 96* 98   CO2 mmol/L 25.2 24.2 22.3   BUN mg/dL 12 13 24*   CREATININE mg/dL 1.17* 1.18* 1.44*   GLUCOSE mg/dL 129* 128* 192*   CALCIUM mg/dL 8.7 8.7 9.2     Results from last 7 days   Lab Units 08/03/19  0930   TROPONIN T ng/mL <0.010     Results from last 7 days   Lab Units 08/05/19  0449 08/03/19  0930   WBC 10*3/mm3 9.36 13.58*   HEMOGLOBIN g/dL 10.2* 11.1*   HEMATOCRIT % 32.0* 34.9   PLATELETS 10*3/mm3 217 220               I reviewed the patient's new clinical results.  I personally viewed and interpreted the patient's CXR        Medication Review:     ceftriaxone 2 g Intravenous Q24H   metoprolol tartrate 25 mg Oral Q12H   rivaroxaban 20 mg Oral Daily With Dinner   sodium chloride 250 mL Intravenous Once   sodium chloride 3 mL Intravenous Q12H         amiodarone 0.5 mg/min Last Rate: 0.5 mg/min (08/06/19 0921)     Diagnostic imaging:  I personally and Independently reviewed and interpreted the following images  CT chest dated 8/5/2019: I agree with the radiologist interpretation    Assessment   1. New A fib with RVR on presentation, converted to NSR  2. Postconversion pauses  3. ROSE, 2ary to dehydration, improving  4. E. coli UTI complicated by Mild left hydronephrosis  5. Sepsis, secondary #4  6. E Coli bacteremia, 2ary to # 4  7. Diarrhea, reportedly, non so far during her hospitalisation  8. Nausea  9. Anemia  10. Sick euthyroid/elevated TSH  11. Chronic cough  12. Mild colonic diverticulosis  13. Lupus anticoagulant  14. Extensive left LE DVT post knee surgery in 2012,  failed warfarin on chronic Xarelto s/p IVC filter 2012  15. Hx of Breast ca, lobular carcinoma, s/p chemotherapy 2014, in remission  16. Abnormal CT chest with bilateral subpleural infiltrates.  Also possible narrowing of the left upper lobe bronchus  17. Left breast nodule on CT chest        Plan   · Rocephin 2g daily for bacteremia starting from 8/5/2019.  Total duration of antibiotics 10 days.  We can switch to Keflex on discharge.  Repeat blood cultures today.  · Amiodarone IV drip for A. fib per cardiology.  · Continue Xarelto  · Consult oncology, Dr. Agustin.  She has seen her before for the breast lesion and she had serial mammograms but I am not sure if the appearance on the CT scan now is more suggestive of pregnancy.  · Repeat CT chest in 1 month with high-resolution images due to the interstitial infiltrates and also to evaluate the endo bronchial narrowing in the left upper lobe..  Check PFT as well.  She may need a bronchoscopy.    Discussed with the CCU team during the multidisciplinary round.      Lety Hood MD  08/06/19  1:02 PM          This note was dictated utilizing Dragon dictation

## 2019-08-06 NOTE — PROGRESS NOTES
Electrophysiology Follow-Up Note      Patient Name: Dorota Ferrer  Age/Sex: 79 y.o. female  : 1940  MRN: [id number]      Day of Service: 19       Chief Complaint/Follow-up: PAF w/significant conversion pauses, bacteremia/sepsis    S: Doing okay, she was up in chair today and tolerated well.       Temp:  [98 °F (36.7 °C)-99.7 °F (37.6 °C)] 98.1 °F (36.7 °C)  Heart Rate:  [62-85] 79  Resp:  [16] 16  BP: (110-143)/(56-94) 137/82     PHYSICAL EXAM:    General Appearance: No acute distress, well developed and well nourished.   Eyes: Conjunctiva and lids: No erythema, swelling, or discharge. Sclera non-icteric.   HENT: Atraumatic, normocephalic. External eyes, ears, and nose normal.   Respiratory: No signs of respiratory distress. Respiration rhythm and depth normal.   Clear to auscultation. No rales, crackles, rhonchi, or wheezing auscultated.   Cardiovascular:  Jugular Venous Pressure: Normal  Heart Rate and Rhythm: Normal, Heart Sounds: Normal S1 and S2. No S3 or S4 noted.  Murmurs: No murmurs noted. No rubs, thrills, or gallops.   Arterial Pulses: Posterior tibialis and dorsalis pedis pulses normal.   Lower Extremities: No edema noted.  Gastrointestinal:  Abdomen soft, non-distended, non-tender.  Musculoskeletal: Normal movement of extremities  Skin: Warm and dry.   Psychiatric: Patient alert and oriented to person, place, and time. Speech and behavior appropriate. Normal mood and affect.       ECG/TELE:           Results from last 7 days   Lab Units 19  0421 19  0449 19  0930   SODIUM mmol/L 139 133* 136   POTASSIUM mmol/L 4.1 3.3* 3.9   CHLORIDE mmol/L 102 96* 98   CO2 mmol/L 25.2 24.2 22.3   BUN mg/dL 12 13 24*   CREATININE mg/dL 1.17* 1.18* 1.44*   GLUCOSE mg/dL 129* 128* 192*   CALCIUM mg/dL 8.7 8.7 9.2     Results from last 7 days   Lab Units 19  0449 19  0930   WBC 10*3/mm3 9.36 13.58*   HEMOGLOBIN g/dL 10.2* 11.1*   HEMATOCRIT % 32.0* 34.9   PLATELETS  10*3/mm3 217 220         Results from last 7 days   Lab Units 08/03/19  0930   TROPONIN T ng/mL <0.010     Results from last 7 days   Lab Units 08/03/19  0930   TSH mIU/mL 12.100*           Current Medications:   Scheduled Meds:  ceftriaxone 2 g Intravenous Q24H   metoprolol tartrate 25 mg Oral Q12H   rivaroxaban 20 mg Oral Daily With Dinner   sodium chloride 250 mL Intravenous Once   sodium chloride 3 mL Intravenous Q12H           Malignant neoplasm of central portion of left female breast (CMS/HCC)    DVT (deep venous thrombosis) (CMS/HCC)    Sick sinus syndrome with tachycardia (CMS/HCC)    Anemia       Plan:     -New onset PAF, initially had prolonged conversion pauses, she was on metoprolol as outpt, that was stopped, she was started on IV amio and restarted on low dose metoprolol, she has been tolerating the amio and low dose metoprolol and has maintained SR since 8/4. Will transition to oral amio and continue to monitor with plans for her to follow up with Dr. Salmeron as out pt and the amio to be short term. Would do a Zio at discharge    -Echo, EF 57%, mildly dilated LA    -Complicated UTI/Sepsis/Bacteremia>E,Coli, IV antibiotics    -Hx of chronic LLE DVT, on rivaroxaban    Dr. Salmeron and I saw her this afternoon.  Her rhythm has been stable, will transition her to oral amnio and leave her on the low-dose beta-blocker and see how she does.  The plan will be to continue amiodarone in the short-term and follow-up with Dr. Salmeron as an outpatient and decide on whether to stop that or continue.  Would recommend doing a ZIO at discharge to monitor rhythm.  Okay to transfer to telemetry from cardiac standpoint     AMERICA Gustafson  08/06/19  3:55 PM

## 2019-08-06 NOTE — PLAN OF CARE
Problem: Patient Care Overview  Goal: Plan of Care Review  Outcome: Ongoing (interventions implemented as appropriate)   08/06/19 0555   Coping/Psychosocial   Plan of Care Reviewed With patient   Plan of Care Review   Progress improving   OTHER   Outcome Summary Pt remains in CCU. Sinus rhythm overnight. Repeat K WNL. 700cc UO. No complaints of pain. Slept well overnight. Amio gtt remains and PO metoprolol given. Will CTM.        Problem: Tissue Perfusion, Ineffective Peripheral (Adult)  Goal: Adequate Tissue Perfusion  Outcome: Ongoing (interventions implemented as appropriate)      Problem: Skin Injury Risk (Adult)  Goal: Skin Health and Integrity  Outcome: Ongoing (interventions implemented as appropriate)      Problem: Fall Risk (Adult)  Goal: Absence of Fall  Outcome: Ongoing (interventions implemented as appropriate)      Problem: Arrhythmia/Dysrhythmia (Symptomatic) (Adult)  Goal: Signs and Symptoms of Listed Potential Problems Will be Absent, Minimized or Managed (Arrhythmia/Dysrhythmia)  Outcome: Ongoing (interventions implemented as appropriate)      Problem: Infection, Risk/Actual (Adult)  Goal: Infection Prevention/Resolution  Outcome: Ongoing (interventions implemented as appropriate)

## 2019-08-06 NOTE — PLAN OF CARE
Problem: Patient Care Overview  Goal: Plan of Care Review   08/06/19 1808   Coping/Psychosocial   Plan of Care Reviewed With patient   Plan of Care Review   Progress improving   OTHER   Outcome Summary VSS, denies any c/o of pain. Remains in normal sinus. Up to chair and bsc today. Tele overflow

## 2019-08-07 LAB
ANION GAP SERPL CALCULATED.3IONS-SCNC: 13.4 MMOL/L (ref 5–15)
BUN BLD-MCNC: 14 MG/DL (ref 8–23)
BUN/CREAT SERPL: 12.8 (ref 7–25)
CALCIUM SPEC-SCNC: 8.8 MG/DL (ref 8.6–10.5)
CHLORIDE SERPL-SCNC: 100 MMOL/L (ref 98–107)
CO2 SERPL-SCNC: 23.6 MMOL/L (ref 22–29)
CREAT BLD-MCNC: 1.09 MG/DL (ref 0.57–1)
GFR SERPL CREATININE-BSD FRML MDRD: 49 ML/MIN/1.73
GLUCOSE BLD-MCNC: 134 MG/DL (ref 65–99)
POTASSIUM BLD-SCNC: 4 MMOL/L (ref 3.5–5.2)
SODIUM BLD-SCNC: 137 MMOL/L (ref 136–145)

## 2019-08-07 PROCEDURE — 97110 THERAPEUTIC EXERCISES: CPT

## 2019-08-07 PROCEDURE — 63710000001 DIPHENHYDRAMINE PER 50 MG: Performed by: INTERNAL MEDICINE

## 2019-08-07 PROCEDURE — 99232 SBSQ HOSP IP/OBS MODERATE 35: CPT | Performed by: NURSE PRACTITIONER

## 2019-08-07 PROCEDURE — 80048 BASIC METABOLIC PNL TOTAL CA: CPT | Performed by: INTERNAL MEDICINE

## 2019-08-07 PROCEDURE — 97161 PT EVAL LOW COMPLEX 20 MIN: CPT

## 2019-08-07 PROCEDURE — 25010000003 CEFTRIAXONE PER 250 MG: Performed by: INTERNAL MEDICINE

## 2019-08-07 RX ORDER — LEVOFLOXACIN 750 MG/1
750 TABLET ORAL EVERY 24 HOURS
Status: DISCONTINUED | OUTPATIENT
Start: 2019-08-07 | End: 2019-08-08 | Stop reason: HOSPADM

## 2019-08-07 RX ORDER — AMIODARONE HYDROCHLORIDE 200 MG/1
200 TABLET ORAL EVERY 12 HOURS SCHEDULED
Status: DISCONTINUED | OUTPATIENT
Start: 2019-08-07 | End: 2019-08-08 | Stop reason: HOSPADM

## 2019-08-07 RX ADMIN — RIVAROXABAN 20 MG: 20 TABLET, FILM COATED ORAL at 18:13

## 2019-08-07 RX ADMIN — AMIODARONE HYDROCHLORIDE 400 MG: 200 TABLET ORAL at 08:41

## 2019-08-07 RX ADMIN — DIPHENHYDRAMINE HYDROCHLORIDE 25 MG: 25 CAPSULE ORAL at 21:43

## 2019-08-07 RX ADMIN — LEVOFLOXACIN 750 MG: 750 TABLET, FILM COATED ORAL at 18:13

## 2019-08-07 RX ADMIN — METOPROLOL TARTRATE 25 MG: 25 TABLET ORAL at 20:10

## 2019-08-07 RX ADMIN — METOPROLOL TARTRATE 25 MG: 25 TABLET ORAL at 08:41

## 2019-08-07 RX ADMIN — CEFTRIAXONE SODIUM 2 G: 2 INJECTION, SOLUTION INTRAVENOUS at 14:11

## 2019-08-07 RX ADMIN — SODIUM CHLORIDE, PRESERVATIVE FREE 3 ML: 5 INJECTION INTRAVENOUS at 20:11

## 2019-08-07 RX ADMIN — SODIUM CHLORIDE, PRESERVATIVE FREE 3 ML: 5 INJECTION INTRAVENOUS at 08:41

## 2019-08-07 RX ADMIN — AMIODARONE HYDROCHLORIDE 200 MG: 200 TABLET ORAL at 20:10

## 2019-08-07 NOTE — PLAN OF CARE
Problem: Patient Care Overview  Goal: Plan of Care Review  Outcome: Ongoing (interventions implemented as appropriate)   08/07/19 0502   Coping/Psychosocial   Plan of Care Reviewed With patient   Plan of Care Review   Progress improving   OTHER   Outcome Summary meds per order, skin care per protocol, sr, see labs and vs     Goal: Individualization and Mutuality  Outcome: Ongoing (interventions implemented as appropriate)    Goal: Discharge Needs Assessment  Outcome: Ongoing (interventions implemented as appropriate)    Goal: Interprofessional Rounds/Family Conf  Outcome: Ongoing (interventions implemented as appropriate)      Problem: Tissue Perfusion, Ineffective Peripheral (Adult)  Goal: Adequate Tissue Perfusion  Outcome: Ongoing (interventions implemented as appropriate)      Problem: Skin Injury Risk (Adult)  Goal: Skin Health and Integrity  Outcome: Ongoing (interventions implemented as appropriate)      Problem: Fall Risk (Adult)  Goal: Absence of Fall  Outcome: Ongoing (interventions implemented as appropriate)      Problem: Arrhythmia/Dysrhythmia (Symptomatic) (Adult)  Goal: Signs and Symptoms of Listed Potential Problems Will be Absent, Minimized or Managed (Arrhythmia/Dysrhythmia)  Outcome: Ongoing (interventions implemented as appropriate)      Problem: Infection, Risk/Actual (Adult)  Goal: Infection Prevention/Resolution  Outcome: Ongoing (interventions implemented as appropriate)      Problem: Pain, Acute (Adult)  Goal: Identify Related Risk Factors and Signs and Symptoms  Outcome: Outcome(s) achieved Date Met: 08/07/19    Goal: Acceptable Pain Control/Comfort Level  Outcome: Ongoing (interventions implemented as appropriate)

## 2019-08-07 NOTE — PROGRESS NOTES
Electrophysiology Follow-Up Note      Patient Name: Dorota Ferrer  Age/Sex: 79 y.o. female  : 1940  MRN: [id number]      Day of Service: 19       Chief Complaint/Follow-up: PAF w/significant conversion pauses, bacteremia/sepsis      S: Feeling better, no new complaints.       Temp:  [97.4 °F (36.3 °C)-98.6 °F (37 °C)] 97.5 °F (36.4 °C)  Heart Rate:  [68-84] 68  Resp:  [16-18] 18  BP: (125-144)/(68-84) 125/68     PHYSICAL EXAM:    General Appearance: No acute distress, well developed and well nourished.   Eyes: Conjunctiva and lids: No erythema, swelling, or discharge. Sclera non-icteric.   HENT: Atraumatic, normocephalic. External eyes, ears, and nose normal.   Respiratory: No signs of respiratory distress. Respiration rhythm and depth normal.   Clear to auscultation. No rales, crackles, rhonchi, or wheezing auscultated.   Cardiovascular:  Jugular Venous Pressure: Normal  Heart Rate and Rhythm: Normal, Heart Sounds: Normal S1 and S2. No S3 or S4 noted.  Murmurs: No murmurs noted. No rubs, thrills, or gallops.   Arterial Pulses: Posterior tibialis and dorsalis pedis pulses normal.   Lower Extremities: No edema noted.  Gastrointestinal:  Abdomen soft, non-distended, non-tender.  Musculoskeletal: Normal movement of extremities  Skin: Warm and dry.   Psychiatric: Patient alert and oriented to person, place, and time. Speech and behavior appropriate. Normal mood and affect.       ECG/TELE:         Results from last 7 days   Lab Units 19  0532 19  0421 199   SODIUM mmol/L 137 139 133*   POTASSIUM mmol/L 4.0 4.1 3.3*   CHLORIDE mmol/L 100 102 96*   CO2 mmol/L 23.6 25.2 24.2   BUN mg/dL 14 12 13   CREATININE mg/dL 1.09* 1.17* 1.18*   GLUCOSE mg/dL 134* 129* 128*   CALCIUM mg/dL 8.8 8.7 8.7     Results from last 7 days   Lab Units 19  0449 19  0930   WBC 10*3/mm3 9.36 13.58*   HEMOGLOBIN g/dL 10.2* 11.1*   HEMATOCRIT % 32.0* 34.9   PLATELETS 10*3/mm3 217 220          Results from last 7 days   Lab Units 08/03/19  0930   TROPONIN T ng/mL <0.010     Results from last 7 days   Lab Units 08/03/19  0930   TSH mIU/mL 12.100*           Current Medications:   Scheduled Meds:  amiodarone 400 mg Oral Q12H   ceftriaxone 2 g Intravenous Q24H   metoprolol tartrate 25 mg Oral Q12H   rivaroxaban 20 mg Oral Daily With Dinner   sodium chloride 250 mL Intravenous Once   sodium chloride 3 mL Intravenous Q12H           Malignant neoplasm of central portion of left female breast (CMS/HCC)    DVT (deep venous thrombosis) (CMS/HCC)    Sick sinus syndrome with tachycardia (CMS/HCC)    Anemia       Plan:     -New onset PAF, initially had prolonged conversion pauses, she was on metoprolol as outpt, that was stopped, she was started on IV amio and restarted on low dose metoprolol, she has been tolerating the amio and low dose metoprolol and has maintained SR since 8/4. Transitioned to oral amio ---plans to continue at current dose for now as well as low dose metoprolol and follow up with Dr. Salmeron as out pt in 4 weeks.  Zio ordered to be placed at discharge,     -Echo, EF 57%, mildly dilated LA     -Complicated UTI/Sepsis/Bacteremia>E,Coli, IV antibiotics     -Hx of chronic LLE DVT, on rivaroxaban    Cardiac status stable--continue amio and metoprolol at current doses. Zio ordered to be placed at discharge. We will sign off. Follow up with Dr. Salmeron in 4 weeks. Call if needed.         Yecenia York, [place]  08/07/19  9:25 AM

## 2019-08-07 NOTE — PROGRESS NOTES
"                                              LOS: 4 days   Patient Care Team:  Micheal Dixon as PCP - General (Cardiology)  Provider, No Known as PCP - Family Medicine  Angela Agustin MD as PCP - Claims Attributed  Angela Agustin MD as Consulting Physician (Hematology and Oncology)  Jorge Luis Pichardo MD as Referring Physician (Family Medicine)    Chief Complaint:  F/up A. fib with RVR, sinus pauses, ICU care, sepsis, UTI    Subjective   Interval History  Reported minimal dry cough today but significantly improved overall.  No shortness of breath.  No palpitation or chest pain.    REVIEW OF SYSTEMS:   CARDIOVASCULAR: No chest pain, chest pressure or chest discomfort. No reported palpitations or edema.   RESPIRATORY: Improved cough.  Has difficulty breathing on activities.  GASTROINTESTINAL: No nausea, vomiting or diarrhea. No abdominal pain or blood.   Constitutional: No fever or chills.    Ventilator/Non-Invasive Ventilation Settings (From admission, onward)    None                Physical Exam:     Vital Signs  Temp:  [97.4 °F (36.3 °C)-98.6 °F (37 °C)] 98.1 °F (36.7 °C)  Heart Rate:  [68-84] 77  Resp:  [16-18] 18  BP: (125-144)/(68-84) 132/72    Intake/Output Summary (Last 24 hours) at 8/7/2019 1638  Last data filed at 8/7/2019 0728  Gross per 24 hour   Intake 600 ml   Output 1450 ml   Net -850 ml     Flowsheet Rows      First Filed Value   Admission Height  165.1 cm (65\") Documented at 08/03/2019 0905   Admission Weight  109 kg (241 lb) Documented at 08/03/2019 0905        No change in PE  General Appearance:    Alert, cooperative, in no acute distress   ENMT:  Mallampati score 3, moist mucous membrane   Eyes: Pupils equals and reactive to light. EOMI.  Injected conjunctivo-   Neck:   Large. Trachea midline. No thyromegaly.   Lungs:    Bilateral basal crackles.  No wheezing.  Nonlabored breathing.    Heart:    Regular rhythm and normal rate, normal S1 and S2, no            murmur   Skin:    No abnormalities " observed.  Normal skin turgor   Abdomen:     Obese. Soft. No tenderness. No HSM.  Positive bowel sounds   Neuro:   Conscious, alert, oriented x3. Strength 5/5 in upper and lower ext   Extremities:   Moves all extremities well, no edema, no cyanosis, no             Redness          Results Review:        Results from last 7 days   Lab Units 08/07/19  0532 08/06/19  0421 08/05/19  0449   SODIUM mmol/L 137 139 133*   POTASSIUM mmol/L 4.0 4.1 3.3*   CHLORIDE mmol/L 100 102 96*   CO2 mmol/L 23.6 25.2 24.2   BUN mg/dL 14 12 13   CREATININE mg/dL 1.09* 1.17* 1.18*   GLUCOSE mg/dL 134* 129* 128*   CALCIUM mg/dL 8.8 8.7 8.7     Results from last 7 days   Lab Units 08/03/19  0930   TROPONIN T ng/mL <0.010     Results from last 7 days   Lab Units 08/05/19  0449 08/03/19  0930   WBC 10*3/mm3 9.36 13.58*   HEMOGLOBIN g/dL 10.2* 11.1*   HEMATOCRIT % 32.0* 34.9   PLATELETS 10*3/mm3 217 220               I reviewed the patient's new clinical results.  I personally viewed and interpreted the patient's CXR        Medication Review:     amiodarone 200 mg Oral Q12H   levoFLOXacin 750 mg Oral Q24H   metoprolol tartrate 25 mg Oral Q12H   rivaroxaban 20 mg Oral Daily With Dinner   sodium chloride 250 mL Intravenous Once   sodium chloride 3 mL Intravenous Q12H          Diagnostic imaging:  I personally and Independently reviewed and interpreted the following images  CT chest dated 8/5/2019: I agree with the radiologist interpretation    Assessment   1. New A fib with RVR on presentation, converted to NSR  2. Postconversion pauses  3. ROSE, 2ary to dehydration, improving  4. E. coli UTI complicated by Mild left hydronephrosis  5. Sepsis, secondary #4  6. E Coli bacteremia, 2ary to # 4  7. Diarrhea, reportedly, non so far during her hospitalisation  8. Nausea  9. Anemia  10. Sick euthyroid/elevated TSH  11. Chronic cough  12. Mild colonic diverticulosis  13. Lupus anticoagulant  14. Extensive left LE DVT post knee surgery in 2012, failed  warfarin on chronic Xarelto s/p IVC filter 2012  15. Hx of Breast ca, lobular carcinoma, s/p chemotherapy 2014, in remission  16. Abnormal CT chest with bilateral subpleural infiltrates.  Also possible narrowing of the left upper lobe bronchus  17. Left breast nodule on CT chest        Plan   · DC Rocephine. Start Levofloxacin 750 mg PO (total of 10 days AB starting from 8/5/2019).  B;lood cultures collected yesterday pending  · Amiodarone switched to PO for A. fib per cardiology.  · Check EKG in AM to monitor QT while on Levofloxacin and Amiodarone  · Continue Xarelto  · Repeat CT chest in 1 month with high-resolution images due to the interstitial infiltrates and also to evaluate the endo bronchial narrowing in the left upper lobe..  Check PFT as well.  She may need a bronchoscopy.          Lety Hood MD  08/07/19  4:38 PM          This note was dictated utilizing [employer] dictation

## 2019-08-07 NOTE — PROGRESS NOTES
Continued Stay Note  [facility]     Patient Name: Dorota Ferrer  MRN: [id number]  Today's Date: 8/7/2019    Admit Date: 8/3/2019    Discharge Plan     Row Name 08/07/19 0920       Plan    Plan  Home with  upon DC.  Pt denies needs at this time.    Plan Comments  Spoke with pt at bedside. She states she just walked w/ PT and feels she did well.  She confirms she lives w/ he  and plans to return home upon DC.  CCP discussed possible HH for PT and nursing. Pt does not feel she will need HH at this time.         Discharge Codes    No documentation.             Jelena Jones RN

## 2019-08-07 NOTE — THERAPY EVALUATION
Acute Care - Physical Therapy Initial Evaluation  [facility]     Patient Name: Dorota Ferrer  : 1940  MRN: [id number]  Today's Date: 2019   Onset of Illness/Injury or Date of Surgery: 19  Date of Referral to PT: 19  Referring Physician: Lety Raines      Admit Date: 8/3/2019    Visit Dx:     ICD-10-CM ICD-9-CM   1. Sick sinus syndrome (CMS/HCC) I49.5 427.81   2. New onset atrial flutter (CMS/HCC) I48.92 427.32   3. Generalized weakness R53.1 780.79   4. Near syncope R55 780.2   5. Muscle weakness (generalized) M62.81 728.87     Patient Active Problem List   Diagnosis   • Malignant neoplasm of central portion of left female breast (CMS/HCC)   • DVT (deep venous thrombosis) (CMS/HCC)   • Osteopenia of multiple sites   • Sick sinus syndrome with tachycardia (CMS/HCC)   • Anemia     Past Medical History:   Diagnosis Date   • Arthritis     Osteoarthritis   • Cancer (CMS/HCC)     Stage I lobular left breast cancer   • DVT (deep venous thrombosis) (CMS/HCC)     Secondary to knee surgery   • Hyperlipidemia    • Hypertension    • Obesity      Past Surgical History:   Procedure Laterality Date   • BREAST BIOPSY Left 2014   • BREAST LUMPECTOMY W/ NEEDLE LOCALIZATION Left 2014   • HYSTERECTOMY     • TOTAL KNEE ARTHROPLASTY  2012    Left   • VENA CAVA FILTER INSERTION  2012    IVC filter         PT ASSESSMENT (last 12 hours)      Physical Therapy Evaluation     Row Name 19 0920          PT Evaluation Time/Intention    Subjective Information  complains of;weakness;fatigue  -MS     Document Type  evaluation  -MS     Mode of Treatment  physical therapy;individual therapy  -MS     Patient Effort  good  -MS     Comment  Pt. reports feeling fatigued, weak, and not being able to get sleep since being in the hospital.  -MS     Row Name 19 0920          General Information    Onset of Illness/Injury or Date of Surgery  19  -MS     Referring Physician    Lety Hood  -MS     Patient Observations  agree to therapy;cooperative  -MS     Prior Level of Function  independent:  -MS     Equipment Currently Used at Home  none  -MS     Pertinent History of Current Functional Problem  Pt. admitted with Diarrhea, Nausea; UTI; Sick Sinus Syndrome  -MS     Existing Precautions/Restrictions  fall  (Significant)   -MS     Risks Reviewed  patient:  -MS     Benefits Reviewed  patient:  -MS     Barriers to Rehab  none identified  -MS     Row Name 08/07/19 0920          Cognitive Assessment/Intervention- PT/OT    Orientation Status (Cognition)  oriented x 3  -MS     Follows Commands (Cognition)  follows one step commands;WNL  -MS     Personal Safety Interventions  fall prevention program maintained;gait belt;nonskid shoes/slippers when out of bed;supervised activity  -MS     Row Name 08/07/19 0920          Bed Mobility Assessment/Treatment    Bed Mobility Assessment/Treatment  supine-sit;sit-supine  -MS     Supine-Sit [place] (Bed Mobility)  contact guard  -MS     Sit-Supine [place] (Bed Mobility)  contact guard  -MS     Row Name 08/07/19 0920          Transfer Assessment/Treatment    Transfer Assessment/Treatment  sit-stand transfer;stand-sit transfer  -MS     Sit-Stand [place] (Transfers)  contact guard  -MS     Stand-Sit [place] (Transfers)  contact guard  -MS     Row Name 08/07/19 0920          Gait/Stairs Assessment/Training    [place] Level (Gait)  contact guard  -MS     Distance in Feet (Gait)  150 feet  -MS     Pattern (Gait)  step-through  -MS     Comment (Gait/Stairs)  Unsteady and shaky at times but no overt losses of balance.    -MS     Row Name 08/07/19 0920          General ROM    GENERAL ROM COMMENTS  BUE/LE (WFL's)  -MS     Row Name 08/07/19 0920          MMT (Manual Muscle Testing)    General MMT Comments  BUE/LE (3+/5)  -MS     Row Name 08/07/19 0920          Pain Assessment    Additional Documentation  Pain Scale: Numbers  Pre/Post-Treatment (Group)  -MS     Row Name 08/07/19 0920          Pain Scale: Numbers Pre/Post-Treatment    Pain Scale: Numbers, Pretreatment  0/10 - no pain  -MS     Pain Scale: Numbers, Post-Treatment  0/10 - no pain  -MS     Row Name 08/07/19 0920          Physical Therapy Clinical Impression    Date of Referral to PT  08/06/19  -MS     Criteria for Skilled Interventions Met (PT Clinical Impression)  treatment indicated  -MS     Rehab Potential (PT Clinical Summary)  good, to achieve stated therapy goals  -MS     Row Name 08/07/19 0920          Physical Therapy Goals    Bed Mobility Goal Selection (PT)  bed mobility, PT goal 1  -MS     Transfer Goal Selection (PT)  transfer, PT goal 1  -MS     Gait Training Goal Selection (PT)  gait training, PT goal 1  -MS     Row Name 08/07/19 0920          Bed Mobility Goal 1 (PT)    Activity/Assistive Device (Bed Mobility Goal 1, PT)  bed mobility activities, all  -MS     [place] Level/Cues Needed (Bed Mobility Goal 1, PT)  independent  -MS     Time Frame (Bed Mobility Goal 1, PT)  long term goal (LTG);5 days  -MS     Row Name 08/07/19 0920          Transfer Goal 1 (PT)    Activity/Assistive Device (Transfer Goal 1, PT)  transfers, all  -MS     [place] Level/Cues Needed (Transfer Goal 1, PT)  independent  -MS     Time Frame (Transfer Goal 1, PT)  long term goal (LTG);5 days  -MS     Row Name 08/07/19 0920          Gait Training Goal 1 (PT)    Activity/Assistive Device (Gait Training Goal 1, PT)  gait (walking locomotion)  -MS     [place] Level (Gait Training Goal 1, PT)  independent  -MS     Distance (Gait Goal 1, PT)  300 feet  -MS     Time Frame (Gait Training Goal 1, PT)  long term goal (LTG);5 days  -MS     Row Name 08/07/19 0920          Positioning and Restraints    Pre-Treatment Position  in bed  -MS     Post Treatment Position  bed  -MS     In Bed  notified nsg;supine;call light within reach;encouraged to call for assist;exit alarm on All lines  intact.  -MS       User Key  (r) = Recorded By, (t) = Taken By, (c) = Cosigned By    Initials Name Provider Type    MS PierreSherwin, PT Physical Therapist        Physical Therapy Education     Title: PT OT SLP Therapies (Done)     Topic: Physical Therapy (Done)     Point: Mobility training (Done)     Learning Progress Summary           Patient Acceptance, E,D, VU,NR by MS at 8/7/2019  9:25 AM                   Point: Home exercise program (Done)     Learning Progress Summary           Patient Acceptance, E,D, VU,NR by MS at 8/7/2019  9:25 AM                   Point: Body mechanics (Done)     Learning Progress Summary           Patient Acceptance, E,D, VU,NR by MS at 8/7/2019  9:25 AM                   Point: Precautions (Done)     Learning Progress Summary           Patient Acceptance, E,D, VU,NR by MS at 8/7/2019  9:25 AM                               User Key     Initials Effective Dates Name Provider Type Discipline    MS 04/03/18 -  PierreSherwin, PT Physical Therapist PT              PT Recommendation and Plan  Anticipated Discharge Disposition (PT): home with assist, home  Planned Therapy Interventions (PT Eval): balance training, bed mobility training, gait training, home exercise program, patient/family education, postural re-education, strengthening, transfer training  Therapy Frequency (PT Clinical Impression): daily  Outcome Summary/Treatment Plan (PT)  Anticipated Discharge Disposition (PT): home with assist, home  Plan of Care Reviewed With: patient  Outcome Summary: Pt. will benefit from skilled inpt. P.T. to address her functional deficits and to assist pt. in regaining her maximum level of independence with functional mobility.   Outcome Measures     Row Name 08/07/19 0900             How much help from another person do you currently need...    Turning from your back to your side while in flat bed without using bedrails?  4  -MS      Moving from lying on back to sitting on the side of a  flat bed without bedrails?  3  -MS      Moving to and from a bed to a chair (including a wheelchair)?  3  -MS      Standing up from a chair using your arms (e.g., wheelchair, bedside chair)?  3  -MS      Climbing 3-5 steps with a railing?  3  -MS      To walk in hospital room?  3  -MS      AM-PAC 6 Clicks Score (PT)  19  -MS         Functional Assessment    Outcome Measure Options  AM-PAC 6 Clicks Basic Mobility (PT)  -MS        User Key  (r) = Recorded By, (t) = Taken By, (c) = Cosigned By    Initials Name Provider Type    Sherwin Tate, PT Physical Therapist         Time Calculation:   PT Charges     Row Name 08/07/19 0926             Time Calculation    Start Time  0852  -MS      Stop Time  0906  -MS      Time Calculation (min)  14 min  -MS      PT Received On  08/07/19  -MS      PT - Next Appointment  08/08/19  -MS      PT Goal Re-Cert Due Date  08/12/19  -MS         Time Calculation- PT    Total Timed Code Minutes- PT  12 minute(s)  -MS        User Key  (r) = Recorded By, (t) = Taken By, (c) = Cosigned By    Initials Name Provider Type    Sherwin Tate, PT Physical Therapist        Therapy Charges for Today     Code Description Service Date Service Provider Modifiers Qty    37314407622 HC PT EVAL LOW COMPLEXITY 1 8/7/2019 Sherwin Pierre, PT GP 1    54890342458 HC PT THER PROC EA 15 MIN 8/7/2019 Sherwin Pierre, PT GP 1          PT G-Codes  Outcome Measure Options: AM-PAC 6 Clicks Basic Mobility (PT)  AM-PAC 6 Clicks Score (PT): 19      Sherwin Pierre PT  8/7/2019

## 2019-08-07 NOTE — PLAN OF CARE
Problem: Patient Care Overview  Goal: Plan of Care Review   08/07/19 0195   Coping/Psychosocial   Plan of Care Reviewed With patient   OTHER   Outcome Summary Pt. will benefit from skilled inpt. P.T. to address her functional deficits and to assist pt. in regaining her maximum level of independence with functional mobility.

## 2019-08-08 ENCOUNTER — APPOINTMENT (OUTPATIENT)
Dept: CARDIOLOGY | Facility: HOSPITAL | Age: 79
End: 2019-08-08

## 2019-08-08 VITALS
BODY MASS INDEX: 39.47 KG/M2 | HEART RATE: 78 BPM | WEIGHT: 236.9 LBS | RESPIRATION RATE: 16 BRPM | DIASTOLIC BLOOD PRESSURE: 67 MMHG | HEIGHT: 65 IN | TEMPERATURE: 98 F | OXYGEN SATURATION: 98 % | SYSTOLIC BLOOD PRESSURE: 143 MMHG

## 2019-08-08 LAB
ANION GAP SERPL CALCULATED.3IONS-SCNC: 14.3 MMOL/L (ref 5–15)
BUN BLD-MCNC: 14 MG/DL (ref 8–23)
BUN/CREAT SERPL: 12.5 (ref 7–25)
CALCIUM SPEC-SCNC: 9.1 MG/DL (ref 8.6–10.5)
CHLORIDE SERPL-SCNC: 104 MMOL/L (ref 98–107)
CO2 SERPL-SCNC: 23.7 MMOL/L (ref 22–29)
CREAT BLD-MCNC: 1.12 MG/DL (ref 0.57–1)
GFR SERPL CREATININE-BSD FRML MDRD: 47 ML/MIN/1.73
GLUCOSE BLD-MCNC: 128 MG/DL (ref 65–99)
POTASSIUM BLD-SCNC: 4.1 MMOL/L (ref 3.5–5.2)
SODIUM BLD-SCNC: 142 MMOL/L (ref 136–145)

## 2019-08-08 PROCEDURE — 93005 ELECTROCARDIOGRAM TRACING: CPT | Performed by: INTERNAL MEDICINE

## 2019-08-08 PROCEDURE — 0296T HC EXT ECG > 48HR TO 21 DAY RCRD W/CONECT INTL RCRD: CPT

## 2019-08-08 PROCEDURE — 93010 ELECTROCARDIOGRAM REPORT: CPT | Performed by: INTERNAL MEDICINE

## 2019-08-08 PROCEDURE — 80048 BASIC METABOLIC PNL TOTAL CA: CPT | Performed by: INTERNAL MEDICINE

## 2019-08-08 PROCEDURE — 97110 THERAPEUTIC EXERCISES: CPT

## 2019-08-08 RX ORDER — AMLODIPINE BESYLATE 10 MG/1
5 TABLET ORAL DAILY
Qty: 30 TABLET | Refills: 2 | Status: SHIPPED | OUTPATIENT
Start: 2019-08-08 | End: 2022-09-22

## 2019-08-08 RX ORDER — AMIODARONE HYDROCHLORIDE 200 MG/1
200 TABLET ORAL EVERY 12 HOURS SCHEDULED
Qty: 60 TABLET | Refills: 2 | Status: SHIPPED | OUTPATIENT
Start: 2019-08-08 | End: 2019-09-12

## 2019-08-08 RX ORDER — LEVOFLOXACIN 750 MG/1
750 TABLET ORAL EVERY 24 HOURS
Qty: 6 TABLET | Refills: 0 | Status: SHIPPED | OUTPATIENT
Start: 2019-08-08 | End: 2019-08-14

## 2019-08-08 RX ADMIN — METOPROLOL TARTRATE 25 MG: 25 TABLET ORAL at 09:13

## 2019-08-08 RX ADMIN — AMIODARONE HYDROCHLORIDE 200 MG: 200 TABLET ORAL at 09:13

## 2019-08-08 RX ADMIN — SODIUM CHLORIDE, PRESERVATIVE FREE 3 ML: 5 INJECTION INTRAVENOUS at 09:14

## 2019-08-08 NOTE — THERAPY TREATMENT NOTE
Acute Care - Physical Therapy Treatment Note  [facility]     Patient Name: Dorota Ferrer  : 1940  MRN: [id number]  Today's Date: 2019  Onset of Illness/Injury or Date of Surgery: 19  Date of Referral to PT: 19  Referring Physician: Lety Raines    Admit Date: 8/3/2019    Visit Dx:    ICD-10-CM ICD-9-CM   1. Sick sinus syndrome (CMS/HCC) I49.5 427.81   2. New onset atrial flutter (CMS/HCC) I48.92 427.32   3. Generalized weakness R53.1 780.79   4. Near syncope R55 780.2   5. Muscle weakness (generalized) M62.81 728.87     Patient Active Problem List   Diagnosis   • Malignant neoplasm of central portion of left female breast (CMS/HCC)   • DVT (deep venous thrombosis) (CMS/HCC)   • Osteopenia of multiple sites   • Sick sinus syndrome with tachycardia (CMS/HCC)   • Anemia       Therapy Treatment    Rehabilitation Treatment Summary     Row Name 19             Treatment Time/Intention    Discipline  physical therapy assistant  -      Document Type  therapy note (daily note)  -      Subjective Information  no complaints  -EH      Mode of Treatment  physical therapy  -EH      Patient/Family Observations  pt supine in bed with HOB elevated, family present in room   -      Care Plan Review  patient/other agree to care plan  -      Therapy Frequency (PT Clinical Impression)  daily  -EH      Patient Effort  good  -EH      Recorded by [] Estela Esqueda PTA 19      Row Name 19             Cognitive Assessment/Intervention- PT/OT    Orientation Status (Cognition)  oriented x 3  -EH      Follows Commands (Cognition)  follows one step commands;WNL  -EH      Personal Safety Interventions  fall prevention program maintained;gait belt;nonskid shoes/slippers when out of bed  -EH      Recorded by [EH] Estela Esqueda PTA 19      Row Name 19             Bed Mobility Assessment/Treatment    Supine-Sit [place] (Bed Mobility)  supervision   -EH      Sit-Supine [place] (Bed Mobility)  supervision  -      Assistive Device (Bed Mobility)  bed rails;head of bed elevated  -EH      Recorded by [] Sundeepmiguelina Estela, [facility] 08/08/19 0930      Row Name 08/08/19 0928             Sit-Stand Transfer    Sit-Stand [place] (Transfers)  stand by assist  -      Recorded by [] Estela Esqueda, [facility] 08/08/19 0930      Row Name 08/08/19 0928             Stand-Sit Transfer    Stand-Sit [place] (Transfers)  stand by assist  -      Recorded by [] Estela Esqueda, [facility] 08/08/19 0930      Row Name 08/08/19 0928             Toilet Transfer    Type (Toilet Transfer)  sit-stand;stand-sit  -EH      [place] Level (Toilet Transfer)  stand by assist  -      Assistive Device (Toilet Transfer)  grab bars/safety frame  -EH      Recorded by [] Estela Esqueda, [facility] 08/08/19 0930      Row Name 08/08/19 0928             Gait/Stairs Assessment/Training    [place] Level (Gait)  contact guard  -      Assistive Device (Gait)  -- No AD  -      Distance in Feet (Gait)  200  -      Pattern (Gait)  step-through  -EH      Recorded by [] Estela Esqueda, [facility] 08/08/19 0930      Row Name 08/08/19 0928             Positioning and Restraints    Pre-Treatment Position  in bed  -EH      Post Treatment Position  bed  -EH      In Bed  supine;call light within reach;encouraged to call for assist;with family/caregiver HOB elevated  -EH      Recorded by [] Estela Esqueda, [facility] 08/08/19 0930        User Key  (r) = Recorded By, (t) = Taken By, (c) = Cosigned By    Initials Name Effective Dates Discipline     Estela Esqueda, [facility] 08/19/18 -  PT                   Physical Therapy Education     Title: PT OT SLP Therapies (Done)     Topic: Physical Therapy (Done)     Point: Mobility training (Done)     Learning Progress Summary           Patient Acceptance, E,D, VU,NR by MS at 8/7/2019  9:25 AM                   Point: Home exercise program (Done)     Learning Progress Summary            Patient Acceptance, E,D, VU,NR by MS at 8/7/2019  9:25 AM                   Point: Body mechanics (Done)     Learning Progress Summary           Patient Acceptance, E,D, VU,NR by MS at 8/7/2019  9:25 AM                   Point: Precautions (Done)     Learning Progress Summary           Patient Acceptance, E,D, VU,NR by MS at 8/7/2019  9:25 AM                               User Key     Initials Effective Dates Name Provider Type Discipline    MS 04/03/18 -  Sherwin Pierre VENKATA, PT Physical Therapist PT                PT Recommendation and Plan  Therapy Frequency (PT Clinical Impression): daily     Outcome Measures     Row Name 08/07/19 0900             How much help from another person do you currently need...    Turning from your back to your side while in flat bed without using bedrails?  4  -MS      Moving from lying on back to sitting on the side of a flat bed without bedrails?  3  -MS      Moving to and from a bed to a chair (including a wheelchair)?  3  -MS      Standing up from a chair using your arms (e.g., wheelchair, bedside chair)?  3  -MS      Climbing 3-5 steps with a railing?  3  -MS      To walk in hospital room?  3  -MS      AM-PAC 6 Clicks Score (PT)  19  -MS         Functional Assessment    Outcome Measure Options  AM-PAC 6 Clicks Basic Mobility (PT)  -MS        User Key  (r) = Recorded By, (t) = Taken By, (c) = Cosigned By    Initials Name Provider Type    Sherwin Tate VENKATA, PT Physical Therapist         Time Calculation:   PT Charges     Row Name 08/08/19 0927             Time Calculation    Start Time  0854  -      Stop Time  0904  -      Time Calculation (min)  10 min  -      PT Received On  08/08/19  -      PT - Next Appointment  08/09/19  -         Time Calculation- PT    Total Timed Code Minutes- PT  10 minute(s)  -        User Key  (r) = Recorded By, (t) = Taken By, (c) = Cosigned By    Initials Name Provider Type    Estela Zaman PTA Physical Therapy Assistant         Therapy Charges for Today     Code Description Service Date Service Provider Modifiers Qty    84650767639 HC PT THER PROC EA 15 MIN 8/8/2019 Estela Esqueda, PTA GP 1          PT G-Codes  Outcome Measure Options: AM-PAC 6 Clicks Basic Mobility (PT)  AM-PAC 6 Clicks Score (PT): 19    Estela Esqueda PTA  8/8/2019

## 2019-08-08 NOTE — PLAN OF CARE
Problem: Patient Care Overview  Goal: Plan of Care Review  Outcome: Ongoing (interventions implemented as appropriate)   08/08/19 0932   Coping/Psychosocial   Plan of Care Reviewed With patient   Plan of Care Review   Progress improving   OTHER   Outcome Summary pt tolerated treatment with no complaints. Pt required supervision for bed mobility and SBA for sit/stand transfers. Pt ambulated 200 feet with CGA, no AD. Pt is progressing well with PT.

## 2019-08-08 NOTE — DISCHARGE SUMMARY
DISCHARGE SUMMARY    Patient Name: Dorota Ferrer  Age/Sex: 79 y.o. female  : 1940  MRN: [id number]  Patient Care Team:  Micheal Dixon as PCP - General (Cardiology)  Provider, No Known as PCP - Family Medicine  Angela Agustin MD as PCP - Claims Attributed  Angela Agustin MD as Consulting Physician (Hematology and Oncology)  Jorge Luis Pichardo MD as Referring Physician (Family Medicine)       Date of Admit: 8/3/2019  Date of Discharge:  19  Discharge Condition: Good    Discharge Diagnoses:  1. New A fib with RVR on presentation, converted to NSR  2. Postconversion pauses  3. ROSE, 2ary to dehydration, improving  4. E. coli UTI complicated by Mild left hydronephrosis  5. Sepsis, secondary #4  6. E Coli bacteremia, 2ary to # 4   7. Diarrhea, reportedly, non so far during her hospitalisation  8. Nausea  9. Anemia  10. Sick euthyroid/elevated TSH  11. Chronic cough  12. Mild colonic diverticulosis  13. Lupus anticoagulant  14. Extensive left LE DVT post knee surgery in , failed warfarin on chronic Xarelto s/p IVC filter   15. Hx of Breast ca, lobular carcinoma, s/p chemotherapy , in remission  16. Abnormal CT chest with bilateral subpleural infiltrates.  Also possible narrowing of the left upper lobe bronchus  17. Left breast nodule on CT chest, likely benign- was previously followed by mammogram and stable  18. Prolonged QTC (<500)    History of present illness from H&P from 8/3/2019:   This is a 79-year-old female patient, lifelong non-smoker who presented here today for generalized weakness and near syncope.     She stated that she became sick on .  Initially started with burning during urination and then followed by dry heaves, decreased p.o. intake, generalized weakness and episodes of dizziness and near syncope described as about to pass out.  She visited her primary care physician on Thursday (2019) and was diagnosed with UTI.  She was prescribed nitrofurantoin.  Her  symptoms did not improve and she gradually got worse with more weakness and more events of near syncope.     In addition, she described having frequent loose stool which started around Sunday as well and have gotten worse.  She denies eating outside or eating undercooked meat.  She had associated dry heaves as above but no vomiting.  She had mild abdominal discomfort as well.  She stated that she saw a red blood one time when she wiped but otherwise no blood in her stool.  Her daughter was present at bedside stated that she had to take her to the bathroom multiple times today and did not see any blood.     On arrival to the ED, patient was noted to be in a flutter with RVR and had a very long sinus pause.  Cardiology contacted but they felt that this was precipitated by metabolic issues and opted to see the patient as a consultant only.  Patient denies prior history of cardiac issues or arrhythmia but reported feeling palpitation and skipped beats during her latest illness.  She denies chest pain.     She reported cough which started couple of months ago, predominantly dry and associated with shortness of breath on activity.  She has not seen a physician for that.  Cough is getting worse.        I reviewed the data from [facility]:  Had UA on 8/1 which showed proteinuria and positive blood and leukocyte esterase but negative nitrite     Labs reviewed:  Creatinine 1.4 (baseline 1); TSH 12; WBC 13.5; hemoglobin 11          Hospital Course:   A fib:   Though to be precipitated by sepsis and perhaps underlying CLAUDIO.   Treated with Amio drip and Metoprolol. Eventually she converted to NSR.     UTI/bacteremia:  She had E. coli bacteremia secondary to UTI.  Mild left hydronephrosis noted as well.  No stone.  She received Rocephin 2 g daily and was placed on levofloxacin 750 mg a day prior to discharge.  Follow-up EKG on the day of discharge was performed to ensure stable QTC and her QTC was 470 (<500) and therefore we will  continue levofloxacin.  Repeat blood culture while on Rocephin was sterile.    Sinus pauses:  Thought to be postconversion sinus pauses.  Initial plan was to place a pacemaker but fortunately the sinus pauses have resolved and patient went into NSR and did not require any further intervention.  She was evaluated by EP cardiology and she'll follow with them.     Cough:  Has subtle peripheral subpleural infiltrates on CT chest of unknown significance.  Cough has improved.    Abnormal CT chest:  In addition to the peripheral infiltrates noted above, there was a possibility of endobronchial lesion in the left upper lobe with hyperinflation in the leading airway.  My evaluation of the CT images was not so impressive and therefore we will plan to repeat CT chest in about a month and if she continues to experience cough and she has any further abnormalities, will consider bronchoscopy    Suspected sleep apnea:  She has typical anatomy and symptoms consistent of loud snoring, apnea.  She has obesity with BMI of 39.  She has comorbid A. fib she would need outpatient evaluation preferably with an lab split-night polysomnography which we will schedule later.    Nausea:  Likely secondary to sepsis.  Resolved    Diarrhea,  Resolved    Left breast nodule:  This was incidentally noted on the CT of the chest and she had previously undergone serial mammogram.  She was seen by Dr. Agustin and the nodule was thought to be benign.  We consulted oncology during her stay and they believe no additional work-up is required.    HTN:  Some adjustment made to her home medications.  These were initially stopped due to hypotension at the end of the day of discharge amlodipine dose was changed from 10mg to 5 mg.      Plan to see her in 1 week for transitional care follow-up to ensure her blood pressure is stable and she has no complications from antibiotics.  We will also address her sleep apnea on the same visit.    Consults:   IP CONSULT TO  PULMONOLOGY  IP CONSULT TO CARDIOLOGY  IP CONSULT TO CARDIAC ELECTROPHYSIOLOGIST  IP CONSULT TO HEMATOLOGY AND ONCOLOGY    Significant Discharge Diagnostics   Procedures Performed:         Pertinent Lab Results:  Results from last 7 days   Lab Units 08/08/19  0557 08/07/19  0532 08/06/19  0421 08/05/19  0449 08/03/19  0930   SODIUM mmol/L 142 137 139 133* 136   POTASSIUM mmol/L 4.1 4.0 4.1 3.3* 3.9   CHLORIDE mmol/L 104 100 102 96* 98   CO2 mmol/L 23.7 23.6 25.2 24.2 22.3   BUN mg/dL 14 14 12 13 24*   CREATININE mg/dL 1.12* 1.09* 1.17* 1.18* 1.44*   GLUCOSE mg/dL 128* 134* 129* 128* 192*   CALCIUM mg/dL 9.1 8.8 8.7 8.7 9.2   AST (SGOT) U/L  --   --   --   --  31   ALT (SGPT) U/L  --   --   --   --  26     Results from last 7 days   Lab Units 08/03/19  0930   TROPONIN T ng/mL <0.010     Results from last 7 days   Lab Units 08/05/19  0449 08/03/19  0930   WBC 10*3/mm3 9.36 13.58*   HEMOGLOBIN g/dL 10.2* 11.1*   HEMATOCRIT % 32.0* 34.9   PLATELETS 10*3/mm3 217 220   MCV fL 93.0 93.6   MCH pg 29.7 29.8   MCHC g/dL 31.9 31.8   RDW % 13.9 13.6   RDW-SD fl 47.4 46.5   MPV fL 10.8 10.2   NEUTROPHIL % % 70.8 83.3*   LYMPHOCYTE % % 11.3* 6.3*   MONOCYTES % % 14.3* 9.0   EOSINOPHIL % % 2.5 0.2*   BASOPHIL % % 0.5 0.3   IMM GRAN % % 0.6* 0.9*   NEUTROS ABS 10*3/mm3 6.62 11.32*   LYMPHS ABS 10*3/mm3 1.06 0.85   MONOS ABS 10*3/mm3 1.34* 1.22*   EOS ABS 10*3/mm3 0.23 0.03   BASOS ABS 10*3/mm3 0.05 0.04   IMMATURE GRANS (ABS) 10*3/mm3 0.06* 0.12*   NRBC /100 WBC 0.1 0.0         Results from last 7 days   Lab Units 08/03/19  0930   MAGNESIUM mg/dL 2.2             Results from last 7 days   Lab Units 08/03/19  0930   TSH mIU/mL 12.100*         Results from last 7 days   Lab Units 08/03/19  1425 08/03/19  0930   PROCALCITONIN ng/mL  --  8.32*   LACTATE mmol/L 0.9  --          Results from last 7 days   Lab Units 08/03/19  0930   LIPASE U/L 31     Results from last 7 days   Lab Units 08/06/19  1538 08/06/19  1537 08/03/19  1429  08/03/19  1425 08/03/19  1238   BLOODCX  No growth at 24 hours No growth at 24 hours Escherichia coli* Escherichia coli*  --    URINECX   --   --   --   --  >100,000 CFU/mL Escherichia coli*   BCIDPCR   --   --   --  Escherichia coli. Identification by BCID PCR.*  --      Results from last 7 days   Lab Units 08/03/19  1238 08/03/19  1152   NITRITE UA   --  Negative   WBC UA /HPF  --  6-12*   BACTERIA UA /HPF  --  Trace*   SQUAM EPITHEL UA /HPF  --  None Seen   URINECX  >100,000 CFU/mL Escherichia coli*  --                Imaging Results:  Imaging Results (all)     Procedure Component Value Units Date/Time    CT Chest Without Contrast [[id number]] Collected:  08/05/19 0829     Updated:  08/05/19 1748    Narrative:       CT CHEST WITHOUT IV CONTRAST     HISTORY: Chronic cough.     TECHNIQUE: Radiation dose reduction techniques were utilized, including  automated exposure control and exposure modulation based on body size.   3 mm images were obtained through the chest without IV contrast.      COMPARISON: None.     FINDINGS: Evaluation is suboptimal without intravenous contrast.     Cholelithiasis is present. Asymmetric perinephric stranding is present  on the left, best visualized on recent CT of the abdomen and pelvis.     There is no mediastinal or axillary adenopathy by size criteria.  Findings of fat necrosis are present and incompletely visualized within  the left breast. Overlying skin thickening is present. There is a 2.2 x  1 cm asymmetric soft tissue density lesion within the midportion of the  left central breast. Asymmetric increased fat stranding is present  throughout the left breast as well.     The ascending aorta is mildly enlarged, measuring 3.6 cm in diameter.  The aortic root is dilated, measuring approximately 3.9 cm. The main  pulmonary artery is also dilated, measuring 4 cm.     There is no pulmonary consolidation, pleural effusion or pneumothorax.  Bibasilar atelectasis and/or pleural/parenchymal  scarring is present. No  suspicious pulmonary nodules are visualized. There is short segment of  occlusion of the left upper lobe bronchus, best seen on coronal image  77. Small amount of probable atelectasis is present within the medial  aspect of the left upper lobe.     No suspicious lytic or blastic osseous lesions are visualized.       Impression:       1.  Focal short segment of occlusion of the proximal aspect of a left  upper lobe bronchus with resultant probable atelectasis and findings of  air trapping within the medial aspect of the left upper lobe. Findings  are not well evaluated on noncontrast CT with potential causes including  stricture and neoplasm/malignancy. Further evaluation with bronchoscopy  versus CT of the chest with contrast is recommended given patient's  history.  2.  Nodular soft tissue density within the left breast which is  incompletely evaluated. Correlation with mammography is recommended to  exclude underlying neoplasm. There is also asymmetric skin thickening  with underlying findings of fat necrosis which are incompletely  visualized within the left breast as well. The skin thickening may be  secondary to a cellulitis and correlation with physical exam is  recommended.  3.  Dilation of the ascending aorta as above.  4.  Dilation of the main pulmonary artery suggestive of pulmonary  arterial hypertension.  5.  Other findings as above.     This report was finalized on 8/5/2019 5:45 PM by Dr. Dalton Raza M.D.       CT Abdomen Pelvis With Contrast [[id number]] Collected:  08/03/19 1031     Updated:  08/03/19 1047    Narrative:       CT ABDOMEN PELVIS W CONTRAST-     INDICATIONS: Left lower quadrant pain, urinary tract infection     TECHNIQUE: Radiation dose reduction techniques were utilized, including  automated exposure control and exposure modulation based on body size.   ABDOMEN AND PELVIS CT     COMPARISON:     FINDINGS:     Right renal cysts, small subcentimeter left renal  low density too small  to characterize, follow-up can characterize change. The left kidney  appears slightly heterogeneous at the mid aspect, with mild left  perinephric fat stranding, may represent pyelonephritis.     The urinary bladder is mostly empty, limiting its assessment.     A tiny calcification posterior margin of the gallbladder may be a tiny  gallbladder stone, calcification gallbladder wall, or calcification at  the anterior margin of the liver.     Otherwise unremarkable appearance of the liver, spleen, adrenal glands,  pancreas, kidneys, bladder.     No bowel obstruction or abnormal bowel thickening is identified.  Numerous colonic diverticula are seen. Some hazy density is seen in the  fat posteriorly adjacent to the proximal sigmoid colon, inferiorly  adjacent to the left kidney, for example image 117 of series 1,  suggesting inflammatory process, but does not appear localized to any of  the numerous diverticula.     No free intraperitoneal gas or free fluid.     Scattered small mesenteric and para-aortic lymph nodes are seen that are  not significant by size criteria.     Abdominal aorta is not aneurysmal. IVC filter noted. Aortic and other  arterial calcifications are present.     The lung bases show small atelectasis. Heart size is borderline.  Subcutaneous varicose veins anteriorly at the pelvis.     Degenerative changes are seen in the spine. No acute fracture is  identified.             Impression:          1. Slight heterogeneity at the mid aspect of the left kidney with mild  left perinephric fat stranding, may reflect pyelonephritis.  2. Some hazy density with fat inferior to the left kidney may be related  to left renal inflammation, also near the proximal sigmoid colon, but  does not appear localized to any of the numerous colonic diverticula.  Follow-up as indications persist.     Discussed by telephone with Dr. Scott at 1040, 08/03/2019.     This report was finalized on 8/3/2019 10:44 AM  by Dr. Jean Simms M.D.             Objective:   Temp:  [98 °F (36.7 °C)-98.6 °F (37 °C)] 98 °F (36.7 °C)  Heart Rate:  [78-82] 78  Resp:  [16] 16  BP: (136-153)/() 143/67   SpO2:  [91 %-98 %] 98 %  on  Flow (L/min):  [2] 2 Device (Oxygen Therapy): room air    Intake/Output Summary (Last 24 hours) at 8/8/2019 1413  Last data filed at 8/8/2019 1339  Gross per 24 hour   Intake 420 ml   Output --   Net 420 ml     Body mass index is 39.42 kg/m².      08/06/19  0652 08/07/19  0506 08/08/19  0627   Weight: 105 kg (231 lb 4.2 oz) 108 kg (238 lb 6.4 oz) 107 kg (236 lb 14.4 oz)     Weight change: -0.68 kg (-8 oz)    Physical Exam:  General Appearance:    Alert, cooperative, in no acute distress   ENMT:  Mallampati score 3, moist mucous membrane   Eyes: Pupils equals and reactive to light. EOMI.  Injected conjunctivo-   Neck:   Large. Trachea midline. No thyromegaly.   Lungs:    Bilateral basal crackles.  No wheezing.  Nonlabored breathing.    Heart:    Regular rhythm and normal rate, normal S1 and S2, no            murmur   Skin:    No abnormalities observed.  Normal skin turgor   Abdomen:     Obese. Soft. No tenderness. No HSM.  Positive bowel sounds   Neuro:   Conscious, alert, oriented x3. Strength 5/5 in upper and lower ext   Extremities:   Moves all extremities well, no edema, no cyanosis, no             Redness             Discharge Medications and Instructions:     Discharge Medications     Discharge Medications      New Medications      Instructions Start Date   amiodarone 200 MG tablet  Commonly known as:  PACERONE   200 mg, Oral, Every 12 Hours Scheduled      levoFLOXacin 750 MG tablet  Commonly known as:  LEVAQUIN   750 mg, Oral, Every 24 Hours      metoprolol tartrate 25 MG tablet  Commonly known as:  LOPRESSOR   25 mg, Oral, Every 12 Hours Scheduled         Changes to Medications      Instructions Start Date   amLODIPine 10 MG tablet  Commonly known as:  NORVASC  What changed:  how much to take   5  mg, Oral, Daily         Continue These Medications      Instructions Start Date   anastrozole 1 MG tablet  Commonly known as:  ARIMIDEX   TAKE 1 TABLET BY MOUTH DAILY      atorvastatin 40 MG tablet  Commonly known as:  LIPITOR   80 mg, Oral      Calcium Carb-Cholecalciferol 600-200 MG-UNIT tablet   Oral, Daily      ezetimibe 10 MG tablet  Commonly known as:  ZETIA   10 mg, Oral, Daily      Krill Oil 1000 MG capsule   Oral      promethazine-dextromethorphan 6.25-15 MG/5ML solution  Commonly known as:  PROMETHAZINE-DM   Oral, 4 Times Daily PRN      rivaroxaban 20 MG tablet  Commonly known as:  XARELTO   Oral         Stop These Medications    metoprolol succinate  MG 24 hr tablet  Commonly known as:  TOPROL-XL     nitrofurantoin 100 MG capsule  Commonly known as:  MACRODANTIN            Discharge Diet:    Dietary Orders (From admission, onward)    Start     Ordered    08/05/19 1402  Diet Regular; Cardiac, Consistent Carbohydrate  Diet Effective Now     Question Answer Comment   Diet Texture / Consistency Regular    Common Modifiers Cardiac    Common Modifiers Consistent Carbohydrate        08/05/19 1403          Activity at Discharge:   as tolerated    Discharge disposition: Home    Discharge Instructions and Follow ups:    Follow-up Information     John Salmeron MD Follow up in 1 month(s).    Specialties:  Cardiology, Cardiac Electrophysiology  Contact information:  [address] [facility] [address]  [place] [address]  [phone]             Micheal Dixon Follow up.    Specialty:  Cardiology  Contact information:  [address] [place] [address]  [phone]             Lety Hood MD Follow up.    Specialties:  Pulmonary Disease, Sleep Medicine  Why:  will call with appointment  Contact information:  [address] [facility] [address]  [place] [address]  [phone]                 Future Appointments   Date Time Provider Department Center   1/7/2020 10:50 AM LAB CHAIR 6 [facility]  [facility]   1/7/2020  11:20 AM Angela Agustin MD [facility]        Medication Reconciliation: Please see electronically completed Med Rec.    Total time spent discharging patient including evaluation, medication reconciliation, arranging follow up, and post hospitalization instructions and education total time exceeds 30 minutes.     Lety Hood MD  08/08/19  2:13 PM      Dictated utilizing Dragon dictation

## 2019-08-08 NOTE — PLAN OF CARE
Problem: Patient Care Overview  Goal: Plan of Care Review  Outcome: Ongoing (interventions implemented as appropriate)  Pt denies pain or discomfort at this time. No acute distress, will continue to monitor.

## 2019-08-09 ENCOUNTER — READMISSION MANAGEMENT (OUTPATIENT)
Dept: CALL CENTER | Facility: HOSPITAL | Age: 79
End: 2019-08-09

## 2019-08-09 NOTE — OUTREACH NOTE
Prep Survey      Responses   Facility patient discharged from?  [facility]   Is patient eligible?  Yes   Discharge diagnosis  New A fib with RVR,    Sepsis secondary to E. coli UTI complicated by mild left hydronephrosis   Does the patient have one of the following disease processes/diagnoses(primary or secondary)?  Sepsis   Does the patient have Home health ordered?  No   Is there a DME ordered?  No   Prep survey completed?  Yes          Leigh Pinto RN

## 2019-08-11 LAB
BACTERIA SPEC AEROBE CULT: NORMAL
BACTERIA SPEC AEROBE CULT: NORMAL

## 2019-08-12 ENCOUNTER — READMISSION MANAGEMENT (OUTPATIENT)
Dept: CALL CENTER | Facility: HOSPITAL | Age: 79
End: 2019-08-12

## 2019-08-12 NOTE — OUTREACH NOTE
Sepsis Week 1 Survey      Responses   Facility patient discharged from?  [facility]   Does the patient have one of the following disease processes/diagnoses(primary or secondary)?  Sepsis   Is there a successful TCM telephone encounter documented?  No   Week 1 attempt successful?  Yes   Call start time  1123   Call end time  1126   Discharge diagnosis  New A fib with RVR,    Sepsis secondary to E. coli UTI complicated by mild left hydronephrosis   Meds reviewed with patient/caregiver?  Yes   Is the patient having any side effects they believe may be caused by any medication additions or changes?  No   Does the patient have all medications related to this admission filled (includes all antibiotics, inhalers, nebulizers,steroids,etc.)  Yes   Is the patient taking all medications as directed (includes completed medication regime)?  Yes   Does the patient have a primary care provider?   Yes   Does the patient have an appointment with their PCP within 7 days of discharge?  Yes   Has the patient kept scheduled appointments due by today?  N/A   Has home health visited the patient within 72 hours of discharge?  N/A   Psychosocial issues?  No   Did the patient receive a copy of their discharge instructions?  Yes   Nursing interventions  Reviewed instructions with patient   What is the patient's perception of their health status since discharge?  Improving   Nursing interventions  Nurse provided patient education   Is the patient/caregiver able to teach back Sepsis?  S - Shivering,fever or very cold, E - Extreme pain or generalized discomfort (worst ever,especially abdomen), P - Pale or discolored skin, S - Sleepy, difficult to arouse,confused, I -   I feel like I might die-a feeling of hopelessness, S - Short of breath   Nursing interventions  Nurse provided reassurance to patient   Is patient/caregiver able to teach back steps to recovery at home?  Set small, achievable goals for return to baseline health, Rest and regain  strength   Is the patient/caregiver able to teach back signs and symptoms of worsening condition:  Fever, Rapid heart rate (>90), Altered mental status(confusion/coma), Hyperthermia, Shortness of breath/rapid respiratory rate, Edema   Is the patient/caregiver able to teach back the hierarchy of who to call/visit for symptoms/problems? PCP, Specialist, Home health nurse, Urgent Care, ED, 911  Yes   Week 1 call completed?  Yes          Charo Ulloa RN

## 2019-08-14 ENCOUNTER — TELEPHONE (OUTPATIENT)
Dept: CARDIOLOGY | Facility: CLINIC | Age: 79
End: 2019-08-14

## 2019-08-14 NOTE — TELEPHONE ENCOUNTER
Pt was told to follow up with Dr Salmeron by the ER. She is not established from what I see with anybody in this office.

## 2019-08-15 ENCOUNTER — TRANSCRIBE ORDERS (OUTPATIENT)
Dept: ADMINISTRATIVE | Facility: HOSPITAL | Age: 79
End: 2019-08-15

## 2019-08-15 DIAGNOSIS — R05.3 CHRONIC COUGH: Primary | ICD-10-CM

## 2019-08-19 ENCOUNTER — READMISSION MANAGEMENT (OUTPATIENT)
Dept: CALL CENTER | Facility: HOSPITAL | Age: 79
End: 2019-08-19

## 2019-08-27 ENCOUNTER — READMISSION MANAGEMENT (OUTPATIENT)
Dept: CALL CENTER | Facility: HOSPITAL | Age: 79
End: 2019-08-27

## 2019-08-27 NOTE — OUTREACH NOTE
Sepsis Week 3 Survey      Responses   Facility patient discharged from?  [facility]   Does the patient have one of the following disease processes/diagnoses(primary or secondary)?  Sepsis   Week 3 attempt successful?  Yes   Call start time  1412   Call end time  1416   Discharge diagnosis  New A fib with RVR,    Sepsis secondary to E. coli UTI complicated by mild left hydronephrosis   Meds reviewed with patient/caregiver?  Yes   Does the patient have all medications related to this admission filled (includes all antibiotics, inhalers, nebulizers,steroids,etc.)  Yes   Is the patient taking all medications as directed (includes completed medication regime)?  Yes   Comments regarding appointments  sleep study scheduled for 8/27/19   Does the patient have a primary care provider?   Yes   Does the patient have an appointment with their PCP within 7 days of discharge?  Yes   Has the patient kept scheduled appointments due by today?  Yes   Has home health visited the patient within 72 hours of discharge?  N/A   Psychosocial issues?  No   Did the patient receive a copy of their discharge instructions?  Yes   Nursing interventions  Reviewed instructions with patient   What is the patient's perception of their health status since discharge?  Improving   Nursing interventions  Nurse provided patient education   Is the patient/caregiver able to teach back Sepsis?  S - Shivering,fever or very cold, E - Extreme pain or generalized discomfort (worst ever,especially abdomen), P - Pale or discolored skin, S - Sleepy, difficult to arouse,confused, I -   I feel like I might die-a feeling of hopelessness, S - Short of breath   Nursing interventions  Nurse provided reassurance to patient   Is patient/caregiver able to teach back steps to recovery at home?  Set small, achievable goals for return to baseline health   Is the patient/caregiver able to teach back signs and symptoms of worsening condition:  Fever, Rapid heart rate (>90),  Altered mental status(confusion/coma), Hyperthermia, Shortness of breath/rapid respiratory rate, Edema   Is the patient/caregiver able to teach back the hierarchy of who to call/visit for symptoms/problems? PCP, Specialist, Home health nurse, Urgent Care, ED, 911  Yes   Week 3 call completed?  Yes          Charo Ulloa RN

## 2019-08-31 PROCEDURE — 0298T HOLTER MONITOR - 72 HOUR UP TO 21 DAY: CPT | Performed by: INTERNAL MEDICINE

## 2019-09-03 ENCOUNTER — READMISSION MANAGEMENT (OUTPATIENT)
Dept: CALL CENTER | Facility: HOSPITAL | Age: 79
End: 2019-09-03

## 2019-09-03 NOTE — OUTREACH NOTE
Sepsis Week 4 Survey      Responses   Facility patient discharged from?  [facility]   Does the patient have one of the following disease processes/diagnoses(primary or secondary)?  Sepsis   Week 4 attempt successful?  No   Rescheduled  Revoked   Revoke  Decline to participate          Elizabeth Phan RN

## 2019-09-12 ENCOUNTER — OFFICE VISIT (OUTPATIENT)
Dept: CARDIOLOGY | Facility: CLINIC | Age: 79
End: 2019-09-12

## 2019-09-12 VITALS
DIASTOLIC BLOOD PRESSURE: 78 MMHG | BODY MASS INDEX: 38.65 KG/M2 | SYSTOLIC BLOOD PRESSURE: 128 MMHG | WEIGHT: 232 LBS | HEIGHT: 65 IN

## 2019-09-12 DIAGNOSIS — I49.5 SICK SINUS SYNDROME WITH TACHYCARDIA (HCC): ICD-10-CM

## 2019-09-12 DIAGNOSIS — I82.502 CHRONIC DEEP VEIN THROMBOSIS (DVT) OF LEFT LOWER EXTREMITY, UNSPECIFIED VEIN (HCC): ICD-10-CM

## 2019-09-12 DIAGNOSIS — I48.0 PAROXYSMAL ATRIAL FIBRILLATION (HCC): Primary | ICD-10-CM

## 2019-09-12 PROCEDURE — 99214 OFFICE O/P EST MOD 30 MIN: CPT | Performed by: INTERNAL MEDICINE

## 2019-09-12 PROCEDURE — 93000 ELECTROCARDIOGRAM COMPLETE: CPT | Performed by: INTERNAL MEDICINE

## 2019-09-13 PROBLEM — I48.91 ATRIAL FIBRILLATION (HCC): Status: ACTIVE | Noted: 2019-09-13

## 2019-09-13 NOTE — PROGRESS NOTES
Date of Office Visit: 2019  Encounter Provider: John Salmeron MD  Place of Service: [facility]  Patient Name: Dorota Ferrer  :1940    Chief complaint  Follow-up of atrial fibrillation    HPI: Dorota Ferrer is a 79 y.o. female who presents today for follow-up of atrial fibrillation.  The patient was recently hospitalized for sepsis secondary to urinary tract infection.  While admitted she had some episodes of paroxysmal atrial fibrillation with associated mildly symptomatic pauses.  She was placed on amiodarone to control the atrial fibrillation and completed treatment for urinary tract infection.  She did well.  She denies any episodes of palpitations or tachycardia since leaving the hospital.  She has not had any presyncope or syncope.  She still complains of mild to moderate constant fatigue, but attributes this to her urinary tract infection, and feels that is improving.          Past Medical History:   Diagnosis Date   • Arthritis     Osteoarthritis   • Cancer (CMS/[facility])     Stage I lobular left breast cancer   • DVT (deep venous thrombosis) (CMS/[facility])     Secondary to knee surgery   • Hyperlipidemia    • Hypertension    • Obesity        Past Surgical History:   Procedure Laterality Date   • BREAST BIOPSY Left 2014   • BREAST LUMPECTOMY W/ NEEDLE LOCALIZATION Left 2014   • HYSTERECTOMY     • TOTAL KNEE ARTHROPLASTY  2012    Left   • VENA CAVA FILTER INSERTION  2012    IVC filter        Social History     Socioeconomic History   • Marital status:      Spouse name: Bharathi   • Number of children: Not on file   • Years of education: Not on file   • Highest education level: Not on file   Occupational History     Employer: RETIRED   Tobacco Use   • Smoking status: Never Smoker   • Smokeless tobacco: Never Used   Substance and Sexual Activity   • Alcohol use: No   • Drug use: No   • Sexual activity: Defer       Family History  "  Problem Relation Age of Onset   • Kidney cancer Mother 78   • Clotting disorder Neg Hx        Review of Systems   Constitution: Positive for weakness and malaise/fatigue.   HENT: Negative.    Eyes: Negative.    Cardiovascular: Negative for chest pain, dyspnea on exertion, leg swelling and near-syncope.   Respiratory: Negative for cough and shortness of breath.    Endocrine: Negative.    Hematologic/Lymphatic: Negative.    Skin: Negative.    Musculoskeletal: Negative.    Gastrointestinal: Negative.    Genitourinary: Negative.    Psychiatric/Behavioral: Negative.    Allergic/Immunologic: Negative.        Allergies   Allergen Reactions   • No Known Drug Allergy Other (See Comments)     No known allergies         Current Outpatient Medications:   •  amLODIPine (NORVASC) 10 MG tablet, Take 0.5 tablets by mouth Daily., Disp: 30 tablet, Rfl: 2  •  anastrozole (ARIMIDEX) 1 MG tablet, TAKE 1 TABLET BY MOUTH DAILY, Disp: 90 tablet, Rfl: 1  •  atorvastatin (LIPITOR) 40 MG tablet, Take 80 mg by mouth., Disp: , Rfl:   •  Calcium Carb-Cholecalciferol 600-200 MG-UNIT tablet, Take  by mouth daily., Disp: , Rfl:   •  ezetimibe (ZETIA) 10 MG tablet, Take 10 mg by mouth Daily., Disp: , Rfl:   •  Krill Oil 1000 MG capsule, Take  by mouth., Disp: , Rfl:   •  metoprolol tartrate (LOPRESSOR) 25 MG tablet, Take 1 tablet by mouth Every 12 (Twelve) Hours., Disp: 60 tablet, Rfl: 2  •  promethazine-dextromethorphan (PROMETHAZINE-DM) 6.25-15 MG/5ML solution, Take  by mouth 4 (Four) Times a Day As Needed for Cough., Disp: , Rfl:   •  rivaroxaban (XARELTO) 20 MG tablet, Take  by mouth., Disp: , Rfl:       Objective:     Vitals:    09/12/19 0941   BP: 128/78   BP Location: Right arm   Patient Position: Sitting   Cuff Size: Large Adult   Weight: 105 kg (232 lb)   Height: 165.1 cm (65\")     Body mass index is 38.61 kg/m².    PHYSICAL EXAM:    Physical Exam   Constitutional: She is oriented to person, place, and time. She appears well-developed and " well-nourished. No distress.   HENT:   Head: Normocephalic and atraumatic.   Eyes: Right eye exhibits no discharge. Left eye exhibits no discharge. No scleral icterus.   Neck: No tracheal deviation present.   Cardiovascular: Normal rate, regular rhythm and normal heart sounds.   Pulmonary/Chest: Effort normal and breath sounds normal.   Abdominal: Soft. Bowel sounds are normal.   Musculoskeletal: She exhibits no edema or deformity.   Neurological: She is alert and oriented to person, place, and time.   Skin: Skin is warm and dry.   Psychiatric: She has a normal mood and affect. Her behavior is normal. Thought content normal.   Nursing note and vitals reviewed.          ECG 12 Lead  Date/Time: 9/12/2019 12:21 PM  Performed by: John Salmeron MD  Authorized by: John Salmeron MD   Comparison: compared with previous ECG from 8/7/2019  Rhythm: sinus rhythm  Other findings: poor R wave progression        She wore a 14-day Holter monitor following discharge.  There are a few occasional episodes of atrial fibrillation 3 and 5 minutes.  No symptomatic pauses.      Assessment:       Diagnosis Plan   1. Paroxysmal atrial fibrillation (CMS/HCC)     2. Sick sinus syndrome with tachycardia (CMS/HCC)     3. Chronic deep vein thrombosis (DVT) of left lower extremity, unspecified vein (CMS/HCC)            Plan:       1.  Paroxysmal atrial fibrillation, stable    Few brief episodes on prolonged Holter monitoring.  No symptoms associated with these episodes.  No significant pauses.  Stop amiodarone.  She has a history of DVT will continue on anticoagulation.    2.  Sick sinus syndrome    No symptomatic pauses since discharge.  Continue observation.    3.  History of DVT    Continue anticoagulation with Xarelto.    As always, it has been a pleasure to participate in your patient's care.      Sincerely,         John Salmeron MD

## 2019-09-17 ENCOUNTER — TRANSCRIBE ORDERS (OUTPATIENT)
Dept: SLEEP MEDICINE | Facility: HOSPITAL | Age: 79
End: 2019-09-17

## 2019-09-17 ENCOUNTER — HOSPITAL ENCOUNTER (OUTPATIENT)
Dept: CT IMAGING | Facility: HOSPITAL | Age: 79
Discharge: HOME | End: 2019-09-17
Admitting: INTERNAL MEDICINE

## 2019-09-17 DIAGNOSIS — R29.818 SUSPECTED SLEEP APNEA: ICD-10-CM

## 2019-09-17 DIAGNOSIS — G47.10 HYPERSOMNIA: ICD-10-CM

## 2019-09-17 DIAGNOSIS — R06.83 SNORING: Primary | ICD-10-CM

## 2019-09-17 DIAGNOSIS — R05.3 CHRONIC COUGH: ICD-10-CM

## 2019-09-17 PROCEDURE — 71250 CT THORAX DX C-: CPT

## 2019-09-27 ENCOUNTER — HOSPITAL ENCOUNTER (OUTPATIENT)
Dept: SLEEP MEDICINE | Facility: HOSPITAL | Age: 79
Discharge: HOME | End: 2019-09-27
Admitting: INTERNAL MEDICINE

## 2019-09-27 VITALS — WEIGHT: 232 LBS | BODY MASS INDEX: 38.65 KG/M2 | HEIGHT: 65 IN

## 2019-09-27 DIAGNOSIS — R06.83 SNORING: ICD-10-CM

## 2019-09-27 DIAGNOSIS — G47.10 HYPERSOMNIA: ICD-10-CM

## 2019-09-27 DIAGNOSIS — R29.818 SUSPECTED SLEEP APNEA: ICD-10-CM

## 2019-09-27 PROCEDURE — 95811 POLYSOM 6/>YRS CPAP 4/> PARM: CPT

## 2019-10-04 ENCOUNTER — CONSULT (OUTPATIENT)
Dept: ORTHOPEDIC SURGERY | Facility: CLINIC | Age: 79
End: 2019-10-04

## 2019-10-04 VITALS — BODY MASS INDEX: 39.05 KG/M2 | HEIGHT: 65 IN | WEIGHT: 234.4 LBS

## 2019-10-04 DIAGNOSIS — Z96.652 STATUS POST TOTAL LEFT KNEE REPLACEMENT: ICD-10-CM

## 2019-10-04 DIAGNOSIS — R52 PAIN: Primary | ICD-10-CM

## 2019-10-04 PROCEDURE — 73562 X-RAY EXAM OF KNEE 3: CPT | Performed by: ORTHOPAEDIC SURGERY

## 2019-10-04 PROCEDURE — 99213 OFFICE O/P EST LOW 20 MIN: CPT | Performed by: ORTHOPAEDIC SURGERY

## 2019-10-04 NOTE — PROGRESS NOTES
"Patient Name: Dorota Ferrer   YOB: 1940  Referring Primary Care Physician: Dion Dixon MD  BMI: Body mass index is 39.01 kg/m².    Chief Complaint:    Chief Complaint   Patient presents with   • Left Knee - Pain   • Consult        HPI:     Dorota Ferrer is a 79 y.o. female who presents today for evaluation of   Chief Complaint   Patient presents with   • Left Knee - Pain   • Consult   .  Ms. Roper was seen back today having some aching in her left lower extremity.  She denies any trauma.  She says that she had an E. coli infection and was hospitalized a month or 2 ago although she is doing better she want to \"make sure my legs not infected\" seen with her .  Denies any fevers or chills she has diffuse pain going from her low back down her leg into her foot she does have a history of back problems when questioned.    This problem is new to this examiner.     Subjective   Medications:   Home Medications:  Current Outpatient Medications on File Prior to Visit   Medication Sig   • amLODIPine (NORVASC) 10 MG tablet Take 0.5 tablets by mouth Daily.   • anastrozole (ARIMIDEX) 1 MG tablet TAKE 1 TABLET BY MOUTH DAILY   • atorvastatin (LIPITOR) 40 MG tablet Take 80 mg by mouth.   • Calcium Carb-Cholecalciferol 600-200 MG-UNIT tablet Take  by mouth daily.   • ezetimibe (ZETIA) 10 MG tablet Take 10 mg by mouth Daily.   • Krill Oil 1000 MG capsule Take  by mouth.   • metoprolol tartrate (LOPRESSOR) 25 MG tablet Take 1 tablet by mouth Every 12 (Twelve) Hours.   • promethazine-dextromethorphan (PROMETHAZINE-DM) 6.25-15 MG/5ML solution Take  by mouth 4 (Four) Times a Day As Needed for Cough.   • rivaroxaban (XARELTO) 20 MG tablet Take  by mouth.     No current facility-administered medications on file prior to visit.      Current Medications:  Scheduled Meds:  Continuous Infusions:  No current facility-administered medications for this visit.   PRN Meds:.    I have reviewed the patient's medical " history in detail and updated the computerized patient record.  Review and summarization of old records includes:    Past Medical History:   Diagnosis Date   • Arthritis     Osteoarthritis   • Cancer (CMS/HCC) 2014    Stage I lobular left breast cancer   • DVT (deep venous thrombosis) (CMS/[facility]) 2012    Secondary to knee surgery   • Hyperlipidemia    • Hypertension    • Obesity         Past Surgical History:   Procedure Laterality Date   • BREAST BIOPSY Left 05/2014   • BREAST LUMPECTOMY W/ NEEDLE LOCALIZATION Left 06/26/2014   • HYSTERECTOMY     • TOTAL KNEE ARTHROPLASTY  07/2012    Left   • VENA CAVA FILTER INSERTION  09/2012    IVC filter         Social History     Occupational History     Employer: RETIRED   Tobacco Use   • Smoking status: Never Smoker   • Smokeless tobacco: Never Used   Substance and Sexual Activity   • Alcohol use: No   • Drug use: No   • Sexual activity: Defer      Social History     Social History Narrative   • Not on file        Family History   Problem Relation Age of Onset   • Kidney cancer Mother 78   • Clotting disorder Neg Hx        ROS: 14 point review of systems was performed and all other systems were reviewed and are negative except for documented findings in HPI and today's encounter.     Allergies:   Allergies   Allergen Reactions   • No Known Drug Allergy Other (See Comments)     No known allergies     Constitutional:  Denies fever, shaking or chills   Eyes:  Denies change in visual acuity   HENT:  Denies nasal congestion or sore throat   Respiratory:  Denies cough or shortness of breath   Cardiovascular:  Denies chest pain or severe LE edema   GI:  Denies abdominal pain, nausea, vomiting, bloody stools or diarrhea   Musculoskeletal:  Numbness, tingling, pain, or loss of motor function only as noted above in history of present illness.  : Denies painful urination or hematuria  Integument:  Denies rash, lesion or ulceration   Neurologic:  Denies headache or focal  "weakness  Endocrine:  Denies lymphadenopathy  Psych:  Denies confusion or change in mental status   Hem:  Denies active bleeding    OBJECTIVE:  Physical Exam: 79 y.o. female  Wt Readings from Last 3 Encounters:   10/04/19 106 kg (234 lb 6.4 oz)   09/27/19 105 kg (232 lb)   09/12/19 105 kg (232 lb)     Ht Readings from Last 1 Encounters:   10/04/19 165.1 cm (65\")     Body mass index is 39.01 kg/m².  There were no vitals filed for this visit.  Vital signs reviewed.     General Appearance:    Alert, cooperative, in no acute distress                  Eyes: conjunctiva clear  ENT: external ears and nose atraumatic  CV: no peripheral edema  Resp: normal respiratory effort  Skin: no rashes or wounds; normal turgor  Psych: mood and affect appropriate  Lymph: no nodes appreciated  Neuro: gross sensation intact  Vascular:  Palpable peripheral pulse in noted extremity  Musculoskeletal Extremities: Exam today shows pleasant lady her left knee has a well-healed incision there is no effusion noted she has 0 to about 120 degrees of motion with good stability walks without a limp and good fluidity no pain or warmth to the touch.    Radiology:   AP lateral left knee taken the office today compared to old x-rays show well-positioned total knee arthroplasty without evidence of effusion    Assessment:     ICD-10-CM ICD-9-CM   1. Pain R52 780.96   2. Status post total left knee replacement Z96.652 V43.65        Procedures       Plan: Biomechanics of pertinent body area discussed.  Risks, benefits, alternatives, comparisons, and complications of accepted medicines, injections, recommendations, surgical procedures, and therapies explained and education provided in laymen's terms. Natural history and expected course of this patient's diagnosis discussed along with evaluation of therapies. Questions answered. When appropriate I also discussed proper use of cane, walker, trekking poles. It appears that her symptoms may be coming from her " back.  Her knee exam is very benign and appears to be functioning well.  If she has more trouble particular to the knee which I explained and told her what to look for be happy to see her back and work it up.      10/4/2019    Much of this encounter note is an electronic transcription/translation of spoken language to printed text. The electronic translation of spoken language may permit erroneous, or at times, nonsensical words or phrases to be inadvertently transcribed; Although I have reviewed the note for such errors, some may still exist

## 2019-10-08 ENCOUNTER — TELEPHONE (OUTPATIENT)
Dept: SLEEP MEDICINE | Facility: HOSPITAL | Age: 79
End: 2019-10-08

## 2019-10-08 NOTE — TELEPHONE ENCOUNTER
Spoke with patient about sleep study results ,sending orders to [facility], patient was notified to Follow up at [facility]

## 2019-10-15 RX ORDER — ANASTROZOLE 1 MG/1
TABLET ORAL
Qty: 90 TABLET | Refills: 1 | Status: SHIPPED | OUTPATIENT
Start: 2019-10-15 | End: 2020-04-20

## 2019-11-13 ENCOUNTER — TELEPHONE (OUTPATIENT)
Dept: SLEEP MEDICINE | Facility: HOSPITAL | Age: 79
End: 2019-11-13

## 2019-11-13 NOTE — TELEPHONE ENCOUNTER
Pts daughter called about her mother's concerns with her bpap. Pt is unable to use bpap, states she can not use it, fights with it every night, cries trying to use it, and feels like she can't breathe right and wondering if CPAP would be more beneficial. Pt is at the point she is ready to give up. She has tried a sleep aid and also tried a different mask. Tech will give info to Dr. Hood and see if he wants to try switching to cpap and/or changing pressures? Will call daughterAngeli, back at [phone].

## 2019-11-21 ENCOUNTER — TELEPHONE (OUTPATIENT)
Dept: SLEEP MEDICINE | Facility: HOSPITAL | Age: 79
End: 2019-11-21

## 2019-11-21 NOTE — TELEPHONE ENCOUNTER
Spoke with patients daughter, changed pressure to Fixed 12 CPAPper Dr Hood . She is to try for a month and call us back if there are issues

## 2019-12-13 ENCOUNTER — TELEPHONE (OUTPATIENT)
Dept: SLEEP MEDICINE | Facility: HOSPITAL | Age: 79
End: 2019-12-13

## 2019-12-13 NOTE — TELEPHONE ENCOUNTER
Patient  Daughter called stating her mother was having difficulties with mask and wanted a maskfit . [facility] mask [facility]

## 2020-01-07 ENCOUNTER — APPOINTMENT (OUTPATIENT)
Dept: LAB | Facility: HOSPITAL | Age: 80
End: 2020-01-07

## 2020-01-16 DIAGNOSIS — C50.112 MALIGNANT NEOPLASM OF CENTRAL PORTION OF LEFT BREAST IN FEMALE, ESTROGEN RECEPTOR POSITIVE (HCC): Primary | ICD-10-CM

## 2020-01-16 DIAGNOSIS — Z17.0 MALIGNANT NEOPLASM OF CENTRAL PORTION OF LEFT BREAST IN FEMALE, ESTROGEN RECEPTOR POSITIVE (HCC): Primary | ICD-10-CM

## 2020-01-20 ENCOUNTER — APPOINTMENT (OUTPATIENT)
Dept: OTHER | Facility: HOSPITAL | Age: 80
End: 2020-01-20

## 2020-01-28 ENCOUNTER — LAB (OUTPATIENT)
Dept: LAB | Facility: HOSPITAL | Age: 80
End: 2020-01-28

## 2020-01-28 ENCOUNTER — OFFICE VISIT (OUTPATIENT)
Dept: ONCOLOGY | Facility: CLINIC | Age: 80
End: 2020-01-28

## 2020-01-28 VITALS
WEIGHT: 232 LBS | BODY MASS INDEX: 38.65 KG/M2 | HEIGHT: 65 IN | TEMPERATURE: 98 F | HEART RATE: 70 BPM | SYSTOLIC BLOOD PRESSURE: 132 MMHG | RESPIRATION RATE: 16 BRPM | OXYGEN SATURATION: 95 % | DIASTOLIC BLOOD PRESSURE: 90 MMHG

## 2020-01-28 DIAGNOSIS — I82.502 CHRONIC DEEP VEIN THROMBOSIS (DVT) OF LEFT LOWER EXTREMITY, UNSPECIFIED VEIN (HCC): ICD-10-CM

## 2020-01-28 DIAGNOSIS — C50.112 MALIGNANT NEOPLASM OF CENTRAL PORTION OF LEFT BREAST IN FEMALE, ESTROGEN RECEPTOR POSITIVE (HCC): Primary | ICD-10-CM

## 2020-01-28 DIAGNOSIS — C50.112 MALIGNANT NEOPLASM OF CENTRAL PORTION OF LEFT BREAST IN FEMALE, ESTROGEN RECEPTOR POSITIVE (HCC): ICD-10-CM

## 2020-01-28 DIAGNOSIS — Z17.0 MALIGNANT NEOPLASM OF CENTRAL PORTION OF LEFT BREAST IN FEMALE, ESTROGEN RECEPTOR POSITIVE (HCC): Primary | ICD-10-CM

## 2020-01-28 DIAGNOSIS — Z17.0 MALIGNANT NEOPLASM OF CENTRAL PORTION OF LEFT BREAST IN FEMALE, ESTROGEN RECEPTOR POSITIVE (HCC): ICD-10-CM

## 2020-01-28 DIAGNOSIS — Z12.31 ENCOUNTER FOR SCREENING MAMMOGRAM FOR MALIGNANT NEOPLASM OF BREAST: ICD-10-CM

## 2020-01-28 DIAGNOSIS — M85.89 OSTEOPENIA OF MULTIPLE SITES: ICD-10-CM

## 2020-01-28 LAB
ALBUMIN SERPL-MCNC: 4.2 G/DL (ref 3.5–5.2)
ALBUMIN/GLOB SERPL: 1.3 G/DL (ref 1.1–2.4)
ALP SERPL-CCNC: 119 U/L (ref 38–116)
ALT SERPL W P-5'-P-CCNC: 11 U/L (ref 0–33)
ANION GAP SERPL CALCULATED.3IONS-SCNC: 12.3 MMOL/L (ref 5–15)
AST SERPL-CCNC: 16 U/L (ref 0–32)
BASOPHILS # BLD AUTO: 0.07 10*3/MM3 (ref 0–0.2)
BASOPHILS NFR BLD AUTO: 0.9 % (ref 0–1.5)
BILIRUB SERPL-MCNC: 0.5 MG/DL (ref 0.2–1.2)
BUN BLD-MCNC: 17 MG/DL (ref 6–20)
BUN/CREAT SERPL: 13.6 (ref 7.3–30)
CALCIUM SPEC-SCNC: 9.6 MG/DL (ref 8.5–10.2)
CHLORIDE SERPL-SCNC: 101 MMOL/L (ref 98–107)
CO2 SERPL-SCNC: 27.7 MMOL/L (ref 22–29)
CREAT BLD-MCNC: 1.25 MG/DL (ref 0.6–1.1)
DEPRECATED RDW RBC AUTO: 47.5 FL (ref 37–54)
EOSINOPHIL # BLD AUTO: 0.15 10*3/MM3 (ref 0–0.4)
EOSINOPHIL NFR BLD AUTO: 2 % (ref 0.3–6.2)
ERYTHROCYTE [DISTWIDTH] IN BLOOD BY AUTOMATED COUNT: 13.4 % (ref 12.3–15.4)
GFR SERPL CREATININE-BSD FRML MDRD: 42 ML/MIN/1.73
GLOBULIN UR ELPH-MCNC: 3.2 GM/DL (ref 1.8–3.5)
GLUCOSE BLD-MCNC: 99 MG/DL (ref 74–124)
HCT VFR BLD AUTO: 39.7 % (ref 34–46.6)
HGB BLD-MCNC: 12.3 G/DL (ref 12–15.9)
IMM GRANULOCYTES # BLD AUTO: 0.02 10*3/MM3 (ref 0–0.05)
IMM GRANULOCYTES NFR BLD AUTO: 0.3 % (ref 0–0.5)
LYMPHOCYTES # BLD AUTO: 2.29 10*3/MM3 (ref 0.7–3.1)
LYMPHOCYTES NFR BLD AUTO: 30.4 % (ref 19.6–45.3)
MCH RBC QN AUTO: 30.4 PG (ref 26.6–33)
MCHC RBC AUTO-ENTMCNC: 31 G/DL (ref 31.5–35.7)
MCV RBC AUTO: 98.3 FL (ref 79–97)
MONOCYTES # BLD AUTO: 0.7 10*3/MM3 (ref 0.1–0.9)
MONOCYTES NFR BLD AUTO: 9.3 % (ref 5–12)
NEUTROPHILS # BLD AUTO: 4.3 10*3/MM3 (ref 1.7–7)
NEUTROPHILS NFR BLD AUTO: 57.1 % (ref 42.7–76)
NRBC BLD AUTO-RTO: 0 /100 WBC (ref 0–0.2)
PLATELET # BLD AUTO: 291 10*3/MM3 (ref 140–450)
PMV BLD AUTO: 9.4 FL (ref 6–12)
POTASSIUM BLD-SCNC: 5 MMOL/L (ref 3.5–4.7)
PROT SERPL-MCNC: 7.4 G/DL (ref 6.3–8)
RBC # BLD AUTO: 4.04 10*6/MM3 (ref 3.77–5.28)
SODIUM BLD-SCNC: 141 MMOL/L (ref 134–145)
WBC NRBC COR # BLD: 7.53 10*3/MM3 (ref 3.4–10.8)

## 2020-01-28 PROCEDURE — 99214 OFFICE O/P EST MOD 30 MIN: CPT | Performed by: NURSE PRACTITIONER

## 2020-01-28 PROCEDURE — 85025 COMPLETE CBC W/AUTO DIFF WBC: CPT

## 2020-01-28 PROCEDURE — 80053 COMPREHEN METABOLIC PANEL: CPT

## 2020-01-28 PROCEDURE — 36415 COLL VENOUS BLD VENIPUNCTURE: CPT

## 2020-01-28 NOTE — PROGRESS NOTES
Subjective     REASONS FOR FOLLOWUP:   1. Extensive deep vein thrombosis of the left lower extremity following knee surgery.   2. Progression of clot in spite of Coumadin therapy. The patient is not felt to be a Coumadin failure but may have been subtherapeutic.   3. Lupus inhibitor identified on thrombophilia evaluation.   4. IVC filter placed by Dr. Perry Rosario September 2012.   5. Resolution of lupus anticoagulant 12/12.   6. Persistent active clot in February 2013, switched to Xarelto lifelong.   7. An 8 mm grade 1 lobular carcinoma, on Arimidex for 5 years. Starting 7/2014  8. Firm plaque-like area in the left breast. Mammographically negative in May 2015.     History of Present Illness  Ms. Ferrer is 79-year-old lady with history of hypercoagulable state on lifelong anticoagulation and a small lobular breast cancer on the left, now on Arimidex for 5 and 1/2 years.      Since we last saw her 6 months ago she was hospitalized in August 2019 with sepsis secondary to UTI as well as bacteremia.  She improved with IV antibiotics.  She also experienced A. fib with RVR, converted to normal sinus rhythm.    She was noted to have an abnormal CT of the chest with peripheral infiltrates noted as well as questionable endobronchial lesion in the left upper lobe.  Follow-up CT scan was recommended, performed 9/17/2019.  Upon review of the chart this shows evidence of 2 tiny noncalcified pulmonary nodules in the left upper lobe, one appearing new since prior CT dated 8/5/2019.  It was recommended she undergo repeat CT scan in 6 months.    As the patient is reviewed back today, she states she did receive a phone call from pulmonology telling her of the findings on repeat CT scan.  She notes that her primary care physician, Dr. Luis Barron, has ordered a follow-up CT scan that will take place this Friday, 1/31/2020.  The patient is having no pulmonary symptoms, denying any cough or chest pain.    She denies any changes to her  breast.  She continues with longstanding area of induration at previous lumpectomy site that is nontender and unchanged.  She denies any new pain.  She is tolerating Arimidex well with no significant side effects.    She also continues on Xarelto for history of DVT and is tolerating this well without evidence of bleeding.    She and her  deny other concerns today.    Active Ambulatory Problems     Diagnosis Date Noted   • Malignant neoplasm of central portion of left female breast (CMS/[facility]) 05/08/2016   • DVT (deep venous thrombosis) (CMS/[facility]) 05/08/2016   • Osteopenia of multiple sites 07/02/2019   • Sick sinus syndrome with tachycardia (CMS/[facility]) 08/03/2019   • Anemia 08/06/2019   • Atrial fibrillation (CMS/[facility]) 09/13/2019     Resolved Ambulatory Problems     Diagnosis Date Noted   • No Resolved Ambulatory Problems     Past Medical History:   Diagnosis Date   • Arthritis    • Cancer (CMS/[facility]) 2014   • Hyperlipidemia    • Hypertension    • Obesity      PAST SURGICAL HISTORY:   1. Hysterectomy.   2. Left knee replacement surgery in July 2012 with Dr. Wilfredo Arambula.     HEMATOLOGIC/ONCOLOGIC HISTORY:  The patient had undergone left total knee replacement in July.  She unfortunately developed a DVT in the left lower extremity after discontinuing her Coumadin prophylaxis.  At that point she was started on Lovenox and resumed Coumadin.  When her Lovenox s  h  ots were discontinued, she developed some more pain and swelling in the leg and was found to have had some extension of clot in the left leg and was readmitted to [facility].  She underwent IVC filter placement with Dr. Perry Rosario and was starte  d   on IV heparin initially.  Coumadin was continued and we felt that she was not a definite Coumadin failure but that she may have been subtherapeutic when the clot extended.  Her thrombophilia workup did reveal the presence of a lupus inhibitor and for thi  s reason we elected to increase her INR goal to  between 2.5 and 3.5.          She also was anemic in the hospital and has been taking oral iron supplementation with some improvement in her hemoglobin.  Repeat iron studies were drawn today but are pending.  Her IN  R in the office today is slightly too high at 3.6 and she will receive a slight Coumadin adjustment dropping from 27 mg of Coumadin weekly down to 24 mg Coumadin weekly (3 mg 5 days a week and 4.5 mg 2 days a week).  Since leaving the hospital she has bee  n faithfully wearing her stocking and is trying to keep the leg elevated and feels that there has been improvement.        Patient seen 10/5/12 doing well.  Lupus anticoagulant and  to be repeated 12 weeks after her original positive lupus anticoagulant.  Her   IVC filter is temporary but I doubt we can remove it at this point.   is probably elevated because of her history of rheumatoid arthritis.        Repeat testing in 12/12 shows resolution of lupus anticoagulant.   is only 1:40 positive.  Plan to repeat her Doppler and assess and consider stopping at six months and switching to aspirin.        The patient was seen on 3/11/3 clinically stable, but repeat Doppler in February 2013 showed clot that looks active and therefore the patient was switched to Xarelto.          New breast cancer diagnosed mammographically in May 2014. Bridging by stopping her Xarelto 2 days before surgery, Lovenox 40 mg for 3 to 4 days postop and then resumption of Xarelto.      Plan and review her pathology reports in 3 weeks after surgery.              The patient was seen on 10/21/2014, having started Arimidex just 1 week ago. Tolerance so far is good.            Patient seen on 05/12/2015 with persistent induration involving the skin of the left breast  above and around the areola measuring 4 x 3 cm.  Ultrasound and mammogram ordered for followup.              Patient seen on 08/04/2015 doing well on Arimidex with thickening and induration. The plaque-like area  in the left breast is unchanged with a negative mammogram and ultrasound in May.     SOCIAL HISTORY: The patient is  and lives with her  in [place], [place]. She is not a smoker and does not drink alcohol.   FAMILY HISTORY: No significant family history of clotting disorders.     Review of Systems   Constitutional: Negative for fatigue.   Respiratory: Negative for chest tightness and shortness of breath.    Cardiovascular: Negative for chest pain.   Gastrointestinal: Negative for constipation, diarrhea, nausea and vomiting.   Genitourinary: Negative for difficulty urinating and frequency.   Neurological: Negative for dizziness and headaches.   Psychiatric/Behavioral: Negative for sleep disturbance.      A comprehensive 14 point review of systems was performed and was negative except as mentioned.    Current Outpatient Medications on File Prior to Visit   Medication Sig Dispense Refill   • amLODIPine (NORVASC) 10 MG tablet Take 0.5 tablets by mouth Daily. 30 tablet 2   • anastrozole (ARIMIDEX) 1 MG tablet TAKE 1 TABLET BY MOUTH DAILY 90 tablet 1   • atorvastatin (LIPITOR) 40 MG tablet Take 80 mg by mouth.     • Calcium Carb-Cholecalciferol 600-200 MG-UNIT tablet Take  by mouth daily.     • ezetimibe (ZETIA) 10 MG tablet Take 10 mg by mouth Daily.     • Krill Oil 1000 MG capsule Take  by mouth.     • metoprolol tartrate (LOPRESSOR) 25 MG tablet Take 1 tablet by mouth Every 12 (Twelve) Hours. 60 tablet 2   • rivaroxaban (XARELTO) 20 MG tablet Take  by mouth.     • promethazine-dextromethorphan (PROMETHAZINE-DM) 6.25-15 MG/5ML solution Take  by mouth 4 (Four) Times a Day As Needed for Cough.       No current facility-administered medications on file prior to visit.          ALLERGIES:    Allergies   Allergen Reactions   • No Known Drug Allergy Unknown - Low Severity       Objective      Vitals:    01/28/20 1320   BP: 132/90   Pulse: 70   Resp: 16   Temp: 98 °F (36.7 °C)   TempSrc: Oral   SpO2: 95%  "  Weight: 105 kg (232 lb)   Height: 165.1 cm (65\")   PainSc: 0-No pain     Current Status 1/28/2020   ECOG score 0       Physical Exam   Pulmonary/Chest:           GENERAL:  Well-developed, well-nourished in no acute distress.   SKIN:  Warm, dry without rashes, purpura or petechiae.  HEAD:  Normocephalic.  EYES:  Pupils equal, round and reactive to light.  EOMs intact.  Conjunctivae normal.  EARS:  Hearing intact.  NOSE:  Septum midline.  No excoriations or nasal discharge.  MOUTH:  Tongue is well-papillated; no stomatitis or ulcers.  Lips normal.  THROAT:  Oropharynx without lesions or exudates.  NECK:  Supple with good range of motion; no thyromegaly or masses, no JVD.  LYMPHATICS:  No cervical, supraclavicular, axillary or inguinal adenopathy.  CHEST:  Lungs clear to auscultation bilaterally. Good airflow.  BREASTS: Right breast is benign; left breast shows a  thickened area above the areola that is unchanged, measuring roughly 5 x 5 cm.  No erythema or tenderness to palpation.  CARDIAC:  Regular rate and rhythm without murmurs, rubs or gallops. Normal S1,S2.  ABDOMEN:  Soft, nontender with no organomegaly or masses.  EXTREMITIES:  No clubbing, cyanosis or edema.  NEUROLOGICAL:  Cranial Nerves II-XII grossly intact.  No focal neurological deficits.  PSYCHIATRIC:  Normal affect and mood.        RECENT LABS:  Results from last 7 days   Lab Units 01/28/20  1301   WBC 10*3/mm3 7.53   NEUTROS ABS 10*3/mm3 4.30   HEMOGLOBIN g/dL 12.3   HEMATOCRIT % 39.7   PLATELETS 10*3/mm3 291             RADIOGRAPHIC DATA:  Mammogram 6/25/2019:  Finding 1: stable focal asymmetry in the left breast, benign-negative.  Finding 2: stable calcifications in the left breast are benign-negative    DEXA scan 6/25/2019:  T score in the left femoral neck measures -1.8, in the range of osteopenia compared to previous exam dated 5/23/2017, there is been a 9.7% increase in bone density.    Lumbar spine T score 1.9, in the range of normal.  " Compared to previous exam dated 5/23/2017, there has been a 5.2% increase in bone density.    Impression: patient's bone density is in the range of osteopenia.    Above findings of both mammogram and DEXA scan reviewed with patient and her  today, 7/2/2019.    Assessment/Plan   1. Extensive deep vein thrombosis of the left lower extremity following knee surgery.   · Progression of clot in spite of Coumadin therapy. The patient is not felt to be a Coumadin failure but may have been subtherapeutic. Now on Xarelto.  · Lupus inhibitor identified on thrombophilia evaluation.   · IVC filter placed by Dr. Perry Rosario September 2012.   · Resolution of lupus anticoagulant 12/12.   · Persistent active clot in February 2013, switched to Xarelto lifelong.   2. An 8 mm grade 1 lobular carcinoma of the breast, initiated Arimidex 7/2014.  Has now been on this for 5.5 years.  Tolerating well.    8. Firm plaque-like area in the left breast. Mammographically negative in May 2015.  This has long remain unchanged and is felt to be an area of scarring post lumpectomy.  9.  Hospitalization in August 2019 for sepsis.  CT scans showing peripheral infiltrates noted as well as questionable endobronchial lesion in the left upper lobe.  Follow-up CT scan was recommended, performed 9/17/2019.  Upon review of the chart this shows evidence of 2 tiny noncalcified pulmonary nodules in the left upper lobe, one appearing new since prior CT dated 8/5/2019.  Patient states she was informed of these results per pulmonology.  That being said she already has a follow-up CT scan scheduled this Friday, 1/31/2020 per primary care, Dr. Barron.  I have requested these results to be faxed to our office.     Plan  1. Continue Arimidex and Xarelto  2. Patient will return in 6 months for follow-up with Dr. Agustin, CBC and CMP.  She will undergo repeat mammogram 1 month prior, due in June 2020, ordered today.  3.  Based on most recent DEXA scan report showing  improved bone mineral density, per Dr. Agustin we will have the patient continue on Arimidex for at least 1.5 more years (5.5 years in).             1/28/2020      CC:

## 2020-02-04 ENCOUNTER — DOCUMENTATION (OUTPATIENT)
Dept: ONCOLOGY | Facility: CLINIC | Age: 80
End: 2020-02-04

## 2020-02-04 NOTE — PROGRESS NOTES
Results of recent follow-up CT scan of the chest, ordered by Dr. Barron, PCP, noted in patient's chart.  Results concerning with multiple noncalcified bilateral pulmonary nodules, larger and more numerous compared to prior CT.  Largest nodule measures 7 mm in size.  I did call the patient to see if she had been informed of these results.  She had not but states that she expects to speak with Dr. Barron this week.  She would like to wait and get his opinion on how to further proceed.  We will therefore follow from afar and check back in with her later this week.

## 2020-02-10 ENCOUNTER — DOCUMENTATION (OUTPATIENT)
Dept: ONCOLOGY | Facility: CLINIC | Age: 80
End: 2020-02-10

## 2020-02-10 NOTE — PROGRESS NOTES
I called the patient back again this week to follow-up once again on recent chest CT findings of concern.  She states that she received a call from primary care, Dr. Barron's office, informing her of the noted change on CT but was also told that he would get back with her once he is back in the office.  She states that he's currently not in the office but she expects for him to call this week.  I asked her to please call us if she needs further assistance and that it is important to have this further evaluated and not to sit on it.  She said OK.

## 2020-02-27 ENCOUNTER — OFFICE VISIT (OUTPATIENT)
Dept: OTHER | Facility: HOSPITAL | Age: 80
End: 2020-02-27

## 2020-02-27 VITALS
HEART RATE: 75 BPM | BODY MASS INDEX: 38.92 KG/M2 | DIASTOLIC BLOOD PRESSURE: 90 MMHG | WEIGHT: 233.6 LBS | TEMPERATURE: 97.4 F | HEIGHT: 65 IN | RESPIRATION RATE: 18 BRPM | OXYGEN SATURATION: 96 % | SYSTOLIC BLOOD PRESSURE: 159 MMHG

## 2020-02-27 DIAGNOSIS — R91.8 PULMONARY NODULES/LESIONS, MULTIPLE: Primary | ICD-10-CM

## 2020-02-27 PROCEDURE — 99204 OFFICE O/P NEW MOD 45 MIN: CPT | Performed by: THORACIC SURGERY (CARDIOTHORACIC VASCULAR SURGERY)

## 2020-02-27 PROCEDURE — G0463 HOSPITAL OUTPT CLINIC VISIT: HCPCS

## 2020-02-27 NOTE — PROGRESS NOTES
Subjective   Patient ID: Dorota Ferrer is a 79 y.o. female is being seen for consultation today at the request of Luis Barron MD    History of Present Illness  Dear Colleague,  Dorota Ferrer was seen in the lung care center at [facility] today February 27, 2020 as part of her multidisciplinary thoracic oncology clinic.  I have reviewed her history her x-rays and have examined her.  Thank you for asking me to participate in the care of Ms. Ferrer.    Patient is a 79-year-old  female.  In August 2019 she developed a UTI.  She was hospitalized with E. coli sepsis.  During that hospitalization she developed atrial fibrillation.  CT scan of the chest was performed which showed nodules in the left upper lobe and right lower lobe.  These were very small and it was elected to just follow these with serial CT scans.    Patient is a non-smoker.  She has had secondhand smoke exposure.  She has a history of stage I breast cancer treated 6 years ago with lumpectomy and radiation.  She takes a REM index daily for this problem.  She is also been diagnosed with lupus anticoagulant deficiency and has had a DVT for which she takes Xarelto.  She is overweight and deconditioned and therefore gets short of breath with minimal exertion.  She has no cough or hemoptysis.  She has no hoarseness or change in her voice.  She has no pleuritic pain.  There has been no unexplained weight loss.    The following portions of the patient's history were reviewed and updated as appropriate: allergies, current medications, past family history, past medical history, past social history, past surgical history and problem list.  Review of Systems   Constitution: Negative.   HENT: Negative.    Eyes: Negative.    Cardiovascular: Positive for leg swelling.   Respiratory: Positive for sleep disturbances due to breathing.    Endocrine: Negative.    Hematologic/Lymphatic: Negative.    Skin: Negative.    Musculoskeletal:  Positive for arthritis and joint pain.   Gastrointestinal: Negative.    Genitourinary: Positive for frequency.   Neurological: Negative.    Psychiatric/Behavioral: Negative.    All other systems reviewed and are negative.    Patient Active Problem List   Diagnosis   • Malignant neoplasm of central portion of left female breast (CMS/[facility])   • DVT (deep venous thrombosis) (CMS/[facility])   • Osteopenia of multiple sites   • Sick sinus syndrome with tachycardia (CMS/[facility])   • Anemia   • Atrial fibrillation (CMS/[facility])     Past Medical History:   Diagnosis Date   • Arthritis     Osteoarthritis   • Cancer (CMS/[facility]) 2014    Stage I lobular left breast cancer   • DVT (deep venous thrombosis) (CMS/[facility]) 2012    Secondary to knee surgery   • Hyperlipidemia    • Hypertension    • Lung nodule    • Obesity      Past Surgical History:   Procedure Laterality Date   • BREAST BIOPSY Left 05/2014   • BREAST LUMPECTOMY W/ NEEDLE LOCALIZATION Left 06/26/2014   • HYSTERECTOMY     • TOTAL KNEE ARTHROPLASTY  07/2012    Left   • VENA CAVA FILTER INSERTION  09/2012    IVC filter      Family History   Problem Relation Age of Onset   • Kidney cancer Mother 78   • Clotting disorder Neg Hx      Social History     Socioeconomic History   • Marital status:      Spouse name: Bharathi   • Number of children: Not on file   • Years of education: Not on file   • Highest education level: Not on file   Occupational History     Employer: RETIRED   Tobacco Use   • Smoking status: Never Smoker   • Smokeless tobacco: Never Used   Substance and Sexual Activity   • Alcohol use: No   • Drug use: No   • Sexual activity: Defer       Current Outpatient Medications:   •  amLODIPine (NORVASC) 10 MG tablet, Take 0.5 tablets by mouth Daily., Disp: 30 tablet, Rfl: 2  •  anastrozole (ARIMIDEX) 1 MG tablet, TAKE 1 TABLET BY MOUTH DAILY, Disp: 90 tablet, Rfl: 1  •  atorvastatin (LIPITOR) 40 MG tablet, Take 80 mg by mouth., Disp: , Rfl:   •  Calcium Carb-Cholecalciferol 600-200  MG-UNIT tablet, Take  by mouth daily., Disp: , Rfl:   •  ezetimibe (ZETIA) 10 MG tablet, Take 10 mg by mouth Daily., Disp: , Rfl:   •  Krill Oil 1000 MG capsule, Take  by mouth., Disp: , Rfl:   •  metoprolol tartrate (LOPRESSOR) 25 MG tablet, Take 1 tablet by mouth Every 12 (Twelve) Hours., Disp: 60 tablet, Rfl: 2  •  promethazine-dextromethorphan (PROMETHAZINE-DM) 6.25-15 MG/5ML solution, Take  by mouth 4 (Four) Times a Day As Needed for Cough., Disp: , Rfl:   •  rivaroxaban (XARELTO) 20 MG tablet, Take  by mouth., Disp: , Rfl:   Allergies   Allergen Reactions   • No Known Drug Allergy Unknown - Low Severity        Objective   Vitals:    02/27/20 0834   BP: 159/90   Pulse: 75   Resp: 18   Temp: 97.4 °F (36.3 °C)   SpO2: 96%     Physical Exam   Constitutional: She is oriented to person, place, and time. She appears well-developed and well-nourished.   HENT:   Head: Normocephalic.   Eyes: Pupils are equal, round, and reactive to light. Conjunctivae, EOM and lids are normal.   Neck: Trachea normal and normal range of motion. Neck supple. No hepatojugular reflux and no JVD present. Carotid bruit is not present. No thyroid mass and no thyromegaly present.   Cardiovascular: Normal rate, regular rhythm, S1 normal, S2 normal, normal heart sounds and normal pulses.  No extrasystoles are present. PMI is not displaced.   1-2+ bilateral lower extremity edema   Pulmonary/Chest: Effort normal and breath sounds normal.   Abdominal: Soft. Normal appearance and bowel sounds are normal. She exhibits no mass. There is no hepatosplenomegaly. There is no tenderness. No hernia.   Musculoskeletal: Normal range of motion.   Neurological: She is alert and oriented to person, place, and time. She has normal strength and normal reflexes. No cranial nerve deficit or sensory deficit. She displays a negative Romberg sign.   Skin: Skin is warm, dry and intact.   Psychiatric: She has a normal mood and affect. Her speech is normal and behavior is  normal. Judgment and thought content normal. Cognition and memory are normal.     Independent Review of Radiographic Studies:    I have independently reviewed CT scans from September 17, 2019 and January 31, 2020.  There are 2 small nodules in the upper portion of the left upper lobe.  One measures approximately 3 mm.  The other has measured 6 mm now but was 3 mm prior.  There is a 7 mm nodule in the lateral aspect of the right lower lobe which is unchanged.  There are diffuse interstitial changes in the periphery of both lungs.  There is no suspicious hilar or mediastinal adenopathy.  There are no pleural effusions.  Assessment/Plan   Assessment:  It appears to me that these nodules are stable over a 4-month period.  It is possible but unlikely that this represents malignancy.  More likely that this is some sort of inflammatory process related to the interstitial findings in the lungs.  In any event these nodules are very small it would be difficult to obtain a satisfactory biopsy.  I have recommended to the patient and her family that we continue to follow these nodules with serial CT scans.  I have recommended that she return to the office in 6 months with a repeat CT of the chest.  We have discussed this in detail.  I have answered all of her questions to her satisfaction.  She agrees to this plan.    Plan:  Return to clinic in 6 months with a CT of the chest with contrast.  I will keep you informed of her progress.  Thank you for allowing me to participate in the care of Ms. Ferrer.    Diagnoses and all orders for this visit:    Pulmonary nodules/lesions, multiple  -     CT Chest With Contrast; Future

## 2020-02-27 NOTE — PATIENT INSTRUCTIONS
Pt seen by Dr. King and will be scheduled for a CT chest in 6 mos and will return to clinic .Pt instructed to call nurse navigator with any questions or concerns. Pt given contact cards for Dr. King and nurse navigator.

## 2020-04-20 RX ORDER — ANASTROZOLE 1 MG/1
TABLET ORAL
Qty: 90 TABLET | Refills: 0 | Status: SHIPPED | OUTPATIENT
Start: 2020-04-20 | End: 2020-07-24 | Stop reason: SDUPTHER

## 2020-06-29 ENCOUNTER — APPOINTMENT (OUTPATIENT)
Dept: WOMENS IMAGING | Facility: HOSPITAL | Age: 80
End: 2020-06-29

## 2020-06-29 PROCEDURE — 77067 SCR MAMMO BI INCL CAD: CPT | Performed by: RADIOLOGY

## 2020-06-29 PROCEDURE — 77063 BREAST TOMOSYNTHESIS BI: CPT | Performed by: RADIOLOGY

## 2020-07-08 NOTE — PROGRESS NOTES
Subjective     REASONS FOR FOLLOWUP:   1. Extensive deep vein thrombosis of the left lower extremity following knee surgery.   2. Progression of clot in spite of Coumadin therapy. The patient is not felt to be a Coumadin failure but may have been subtherapeutic.   3. Lupus inhibitor identified on thrombophilia evaluation.   4. IVC filter placed by Dr. Perry Rosario September 2012.   5. Resolution of lupus anticoagulant 12/12.   6. Persistent active clot in February 2013, switched to Xarelto lifelong.   7. An 8 mm grade 1 lobular carcinoma, on Arimidex for 5 years. Starting 7/2014  8. Firm plaque-like area in the left breast. Mammographically negative in May 2015.     History of Present Illness  Ms. Ferrer is 79-year-old lady with history of hypercoagulable state on lifelong anticoagulation and a small lobular breast cancer on the left, now on Arimidex for 6 years.      Since we last saw her 6 months ago she was hospitalized in August 2019 with sepsis secondary to UTI as well as bacteremia.  She improved with IV antibiotics.  She also experienced A. fib with RVR, converted to normal sinus rhythm.    She was noted to have an abnormal CT of the chest with peripheral infiltrates noted as well as questionable endobronchial lesion in the left upper lobe.  Follow-up CT scan was recommended, performed 9/17/2019.  Upon review of the chart this shows evidence of 2 tiny noncalcified pulmonary nodules in the left upper lobe, one appearing new since prior CT dated 8/5/2019.  It was recommended she undergo repeat CT scan in 6 months.  This was not done because of the coronavirus pandemic and I will schedule this for her before she goes to see her pulmonologist next month    She denies any changes to her breast.  She continues with longstanding area of induration at previous lumpectomy site that is nontender and unchanged.  She denies any new pain.  She is tolerating Arimidex well with no significant side effects.    Mammogram done  2 weeks ago was benign-next DEXA scan is due next June  She also continues on Xarelto for history of DVT and is tolerating this well without evidence of bleeding.    She has no other concerns today.    Active Ambulatory Problems     Diagnosis Date Noted   • Malignant neoplasm of central portion of left female breast (CMS/[facility]) 05/08/2016   • DVT (deep venous thrombosis) (CMS/[facility]) 05/08/2016   • Osteopenia of multiple sites 07/02/2019   • Sick sinus syndrome with tachycardia (CMS/[facility]) 08/03/2019   • Anemia 08/06/2019   • Atrial fibrillation (CMS/[facility]) 09/13/2019     Resolved Ambulatory Problems     Diagnosis Date Noted   • No Resolved Ambulatory Problems     Past Medical History:   Diagnosis Date   • Arthritis    • Cancer (CMS/[facility]) 2014   • Hyperlipidemia    • Hypertension    • Lung nodule    • Obesity      PAST SURGICAL HISTORY:   1. Hysterectomy.   2. Left knee replacement surgery in July 2012 with Dr. Wilfredo Arambula.     HEMATOLOGIC/ONCOLOGIC HISTORY:  The patient had undergone left total knee replacement in July.  She unfortunately developed a DVT in the left lower extremity after discontinuing her Coumadin prophylaxis.  At that point she was started on Lovenox and resumed Coumadin.  When her Lovenox s  h  ots were discontinued, she developed some more pain and swelling in the leg and was found to have had some extension of clot in the left leg and was readmitted to [facility].  She underwent IVC filter placement with Dr. Perry Rosario and was starte  d   on IV heparin initially.  Coumadin was continued and we felt that she was not a definite Coumadin failure but that she may have been subtherapeutic when the clot extended.  Her thrombophilia workup did reveal the presence of a lupus inhibitor and for thi  s reason we elected to increase her INR goal to between 2.5 and 3.5.          She also was anemic in the hospital and has been taking oral iron supplementation with some improvement in her hemoglobin.   Repeat iron studies were drawn today but are pending.  Her IN  R in the office today is slightly too high at 3.6 and she will receive a slight Coumadin adjustment dropping from 27 mg of Coumadin weekly down to 24 mg Coumadin weekly (3 mg 5 days a week and 4.5 mg 2 days a week).  Since leaving the hospital she has bee  n faithfully wearing her stocking and is trying to keep the leg elevated and feels that there has been improvement.        Patient seen 10/5/12 doing well.  Lupus anticoagulant and  to be repeated 12 weeks after her original positive lupus anticoagulant.  Her   IVC filter is temporary but I doubt we can remove it at this point.   is probably elevated because of her history of rheumatoid arthritis.        Repeat testing in 12/12 shows resolution of lupus anticoagulant.   is only 1:40 positive.  Plan to repeat her Doppler and assess and consider stopping at six months and switching to aspirin.        The patient was seen on 3/11/3 clinically stable, but repeat Doppler in February 2013 showed clot that looks active and therefore the patient was switched to Xarelto.          New breast cancer diagnosed mammographically in May 2014. Bridging by stopping her Xarelto 2 days before surgery, Lovenox 40 mg for 3 to 4 days postop and then resumption of Xarelto.      Plan and review her pathology reports in 3 weeks after surgery.              The patient was seen on 10/21/2014, having started Arimidex just 1 week ago. Tolerance so far is good.            Patient seen on 05/12/2015 with persistent induration involving the skin of the left breast  above and around the areola measuring 4 x 3 cm.  Ultrasound and mammogram ordered for followup.              Patient seen on 08/04/2015 doing well on Arimidex with thickening and induration. The plaque-like area in the left breast is unchanged with a negative mammogram and ultrasound in May.     SOCIAL HISTORY: The patient is  and lives with her  in  [place], [place]. She is not a smoker and does not drink alcohol.   FAMILY HISTORY: No significant family history of clotting disorders.     Review of Systems   Constitutional: Negative for fatigue.   Respiratory: Negative for chest tightness and shortness of breath.    Cardiovascular: Negative for chest pain.   Gastrointestinal: Negative for constipation, diarrhea, nausea and vomiting.   Genitourinary: Negative for difficulty urinating and frequency.   Neurological: Negative for dizziness and headaches.   Psychiatric/Behavioral: Negative for sleep disturbance.      A comprehensive 14 point review of systems was performed and was negative except as mentioned.    Current Outpatient Medications on File Prior to Visit   Medication Sig Dispense Refill   • amLODIPine (NORVASC) 10 MG tablet Take 0.5 tablets by mouth Daily. 30 tablet 2   • anastrozole (ARIMIDEX) 1 MG tablet TAKE 1 TABLET BY MOUTH DAILY 90 tablet 0   • atorvastatin (LIPITOR) 40 MG tablet Take 80 mg by mouth.     • Calcium Carb-Cholecalciferol 600-200 MG-UNIT tablet Take  by mouth daily.     • ezetimibe (ZETIA) 10 MG tablet Take 10 mg by mouth Daily.     • Krill Oil 1000 MG capsule Take  by mouth.     • metoprolol tartrate (LOPRESSOR) 25 MG tablet Take 1 tablet by mouth Every 12 (Twelve) Hours. 60 tablet 2   • promethazine-dextromethorphan (PROMETHAZINE-DM) 6.25-15 MG/5ML solution Take  by mouth 4 (Four) Times a Day As Needed for Cough.     • rivaroxaban (XARELTO) 20 MG tablet Take  by mouth.       No current facility-administered medications on file prior to visit.          ALLERGIES:    Allergies   Allergen Reactions   • No Known Drug Allergy Unknown - Low Severity       Objective      There were no vitals filed for this visit.  Current Status 1/28/2020   ECOG score 0       Physical Exam   Pulmonary/Chest:           GENERAL:  Well-developed, well-nourished in no acute distress.   SKIN:  Warm, dry without rashes, purpura or petechiae.  HEAD:   Normocephalic.  EYES:  Pupils equal, round and reactive to light.  EOMs intact.  Conjunctivae normal.  EARS:  Hearing intact.  NOSE:  Septum midline.  No excoriations or nasal discharge.  MOUTH:  Tongue is well-papillated; no stomatitis or ulcers.  Lips normal.  THROAT:  Oropharynx without lesions or exudates.  NECK:  Supple with good range of motion; no thyromegaly or masses, no JVD.  LYMPHATICS:  No cervical, supraclavicular, axillary or inguinal adenopathy.  CHEST:  Lungs clear to auscultation bilaterally. Good airflow.  BREASTS: Right breast is benign; left breast shows a  thickened area above the areola that is unchanged, measuring roughly 3 x 4 cm.  No erythema or tenderness to palpation.  CARDIAC:  Regular rate and rhythm without murmurs, rubs or gallops. Normal S1,S2.  ABDOMEN:  Soft, nontender with no organomegaly or masses.  EXTREMITIES:  No clubbing, cyanosis or edema.  NEUROLOGICAL:  Cranial Nerves II-XII grossly intact.  No focal neurological deficits.  PSYCHIATRIC:  Normal affect and mood.    I have reexamined the patient and the results are consistent with the previously documented exam. U Angela Agustin MD       RECENT LABS:       Lab Results   Component Value Date    WBC 7.05 07/15/2020    HGB 11.8 (L) 07/15/2020    HCT 37.7 07/15/2020    MCV 95.7 07/15/2020     07/15/2020               RADIOGRAPHIC DATA:  Mammogram 6/25/2019:  Finding 1: stable focal asymmetry in the left breast, benign-negative.  Finding 2: stable calcifications in the left breast are benign-negative    DEXA scan 6/25/2019:  T score in the left femoral neck measures -1.8, in the range of osteopenia compared to previous exam dated 5/23/2017, there is been a 9.7% increase in bone density.    Lumbar spine T score 1.9, in the range of normal.  Compared to previous exam dated 5/23/2017, there has been a 5.2% increase in bone density.    Impression: patient's bone density is in the range of osteopenia.    Above findings of both  mammogram and DEXA scan reviewed with patient and her  today, 7/2/2019.    Assessment/Plan   1. Extensive deep vein thrombosis of the left lower extremity following knee surgery.   · Progression of clot in spite of Coumadin therapy. The patient is not felt to be a Coumadin failure but may have been subtherapeutic. Now on Xarelto.  · Lupus inhibitor identified on thrombophilia evaluation.   · IVC filter placed by Dr. Perry Rosario September 2012.   · Resolution of lupus anticoagulant 12/12.   · Persistent active clot in February 2013, switched to Xarelto lifelong.   ·   2. pT1bN0- 8 mm grade 1 lobular carcinoma of the breast postlumpectomy radiation, initiated Arimidex 7/2014.  Plan to stop at 7 years    3 Firm plaque-like area in the left breast. Mammographically negative in May 2015.  This has long remain unchanged and is felt to be an area of scarring post lumpectomy  .  4.  Hospitalization in August 2019 for sepsis.  CT scans showing peripheral infiltrates noted as well as questionable endobronchial lesion in the left upper lobe.  Follow-up CT scan was recommended, performed 9/17/2019.  Upon review of the chart this shows evidence of 2 tiny noncalcified pulmonary nodules in the left upper lobe, one appearing new since prior CT dated 8/5/2019-repeat CAT scan ordered for 720  /  Plan  1. Continue Arimidex and Xarelto  2.   Based on most recent DEXA scan report showing improved bone mineral density and her excellent tolerance,  we will have the patient continue on Arimidex for at least 1 more year (7 yrs total)).  3. CT of the chest to follow-up on abnormalities last year she will call for the results   4. return in 1 yr with mammogram and DEXA scan           7/8/2020      CC:

## 2020-07-15 ENCOUNTER — OFFICE VISIT (OUTPATIENT)
Dept: ONCOLOGY | Facility: CLINIC | Age: 80
End: 2020-07-15

## 2020-07-15 ENCOUNTER — LAB (OUTPATIENT)
Dept: LAB | Facility: HOSPITAL | Age: 80
End: 2020-07-15

## 2020-07-15 VITALS
OXYGEN SATURATION: 98 % | HEART RATE: 90 BPM | SYSTOLIC BLOOD PRESSURE: 130 MMHG | TEMPERATURE: 97.5 F | HEIGHT: 66 IN | BODY MASS INDEX: 37.9 KG/M2 | WEIGHT: 235.8 LBS | DIASTOLIC BLOOD PRESSURE: 80 MMHG | RESPIRATION RATE: 16 BRPM

## 2020-07-15 DIAGNOSIS — Z17.0 MALIGNANT NEOPLASM OF CENTRAL PORTION OF LEFT BREAST IN FEMALE, ESTROGEN RECEPTOR POSITIVE (HCC): ICD-10-CM

## 2020-07-15 DIAGNOSIS — C50.112 MALIGNANT NEOPLASM OF CENTRAL PORTION OF LEFT BREAST IN FEMALE, ESTROGEN RECEPTOR POSITIVE (HCC): ICD-10-CM

## 2020-07-15 DIAGNOSIS — C50.112 MALIGNANT NEOPLASM OF CENTRAL PORTION OF LEFT BREAST IN FEMALE, ESTROGEN RECEPTOR POSITIVE (HCC): Primary | ICD-10-CM

## 2020-07-15 DIAGNOSIS — Z17.0 MALIGNANT NEOPLASM OF CENTRAL PORTION OF LEFT BREAST IN FEMALE, ESTROGEN RECEPTOR POSITIVE (HCC): Primary | ICD-10-CM

## 2020-07-15 DIAGNOSIS — Z12.31 ENCOUNTER FOR SCREENING MAMMOGRAM FOR MALIGNANT NEOPLASM OF BREAST: ICD-10-CM

## 2020-07-15 LAB
ALBUMIN SERPL-MCNC: 4.1 G/DL (ref 3.5–5.2)
ALBUMIN/GLOB SERPL: 1.4 G/DL (ref 1.1–2.4)
ALP SERPL-CCNC: 103 U/L (ref 38–116)
ALT SERPL W P-5'-P-CCNC: 11 U/L (ref 0–33)
ANION GAP SERPL CALCULATED.3IONS-SCNC: 11 MMOL/L (ref 5–15)
AST SERPL-CCNC: 15 U/L (ref 0–32)
BASOPHILS # BLD AUTO: 0.06 10*3/MM3 (ref 0–0.2)
BASOPHILS NFR BLD AUTO: 0.9 % (ref 0–1.5)
BILIRUB SERPL-MCNC: 0.4 MG/DL (ref 0.2–1.2)
BUN SERPL-MCNC: 17 MG/DL (ref 6–20)
BUN/CREAT SERPL: 13.2 (ref 7.3–30)
CALCIUM SPEC-SCNC: 9.6 MG/DL (ref 8.5–10.2)
CHLORIDE SERPL-SCNC: 104 MMOL/L (ref 98–107)
CO2 SERPL-SCNC: 26 MMOL/L (ref 22–29)
CREAT SERPL-MCNC: 1.29 MG/DL (ref 0.6–1.1)
DEPRECATED RDW RBC AUTO: 49.9 FL (ref 37–54)
EOSINOPHIL # BLD AUTO: 0.1 10*3/MM3 (ref 0–0.4)
EOSINOPHIL NFR BLD AUTO: 1.4 % (ref 0.3–6.2)
ERYTHROCYTE [DISTWIDTH] IN BLOOD BY AUTOMATED COUNT: 14.1 % (ref 12.3–15.4)
GFR SERPL CREATININE-BSD FRML MDRD: 40 ML/MIN/1.73
GLOBULIN UR ELPH-MCNC: 2.9 GM/DL (ref 1.8–3.5)
GLUCOSE SERPL-MCNC: 115 MG/DL (ref 74–124)
HCT VFR BLD AUTO: 37.7 % (ref 34–46.6)
HGB BLD-MCNC: 11.8 G/DL (ref 12–15.9)
IMM GRANULOCYTES # BLD AUTO: 0.03 10*3/MM3 (ref 0–0.05)
IMM GRANULOCYTES NFR BLD AUTO: 0.4 % (ref 0–0.5)
LYMPHOCYTES # BLD AUTO: 1.82 10*3/MM3 (ref 0.7–3.1)
LYMPHOCYTES NFR BLD AUTO: 25.8 % (ref 19.6–45.3)
MCH RBC QN AUTO: 29.9 PG (ref 26.6–33)
MCHC RBC AUTO-ENTMCNC: 31.3 G/DL (ref 31.5–35.7)
MCV RBC AUTO: 95.7 FL (ref 79–97)
MONOCYTES # BLD AUTO: 0.6 10*3/MM3 (ref 0.1–0.9)
MONOCYTES NFR BLD AUTO: 8.5 % (ref 5–12)
NEUTROPHILS NFR BLD AUTO: 4.44 10*3/MM3 (ref 1.7–7)
NEUTROPHILS NFR BLD AUTO: 63 % (ref 42.7–76)
NRBC BLD AUTO-RTO: 0 /100 WBC (ref 0–0.2)
PLATELET # BLD AUTO: 260 10*3/MM3 (ref 140–450)
PMV BLD AUTO: 9.2 FL (ref 6–12)
POTASSIUM SERPL-SCNC: 4.4 MMOL/L (ref 3.5–4.7)
PROT SERPL-MCNC: 7 G/DL (ref 6.3–8)
RBC # BLD AUTO: 3.94 10*6/MM3 (ref 3.77–5.28)
SODIUM SERPL-SCNC: 141 MMOL/L (ref 134–145)
WBC # BLD AUTO: 7.05 10*3/MM3 (ref 3.4–10.8)

## 2020-07-15 PROCEDURE — 85025 COMPLETE CBC W/AUTO DIFF WBC: CPT

## 2020-07-15 PROCEDURE — 99214 OFFICE O/P EST MOD 30 MIN: CPT | Performed by: INTERNAL MEDICINE

## 2020-07-15 PROCEDURE — 80053 COMPREHEN METABOLIC PANEL: CPT

## 2020-07-15 PROCEDURE — 36415 COLL VENOUS BLD VENIPUNCTURE: CPT

## 2020-07-24 RX ORDER — ANASTROZOLE 1 MG/1
1 TABLET ORAL DAILY
Qty: 90 TABLET | Refills: 3 | Status: SHIPPED | OUTPATIENT
Start: 2020-07-24 | End: 2021-07-27

## 2020-08-20 ENCOUNTER — APPOINTMENT (OUTPATIENT)
Dept: PET IMAGING | Facility: HOSPITAL | Age: 80
End: 2020-08-20

## 2020-08-25 ENCOUNTER — APPOINTMENT (OUTPATIENT)
Dept: CT IMAGING | Facility: HOSPITAL | Age: 80
End: 2020-08-25

## 2020-09-16 ENCOUNTER — HOSPITAL ENCOUNTER (OUTPATIENT)
Dept: CT IMAGING | Facility: HOSPITAL | Age: 80
Discharge: HOME | End: 2020-09-16
Admitting: THORACIC SURGERY (CARDIOTHORACIC VASCULAR SURGERY)

## 2020-09-16 DIAGNOSIS — R91.8 PULMONARY NODULES/LESIONS, MULTIPLE: ICD-10-CM

## 2020-09-16 LAB — CREAT BLDA-MCNC: 1.3 MG/DL (ref 0.6–1.3)

## 2020-09-16 PROCEDURE — 71260 CT THORAX DX C+: CPT

## 2020-09-16 PROCEDURE — 82565 ASSAY OF CREATININE: CPT

## 2020-09-16 PROCEDURE — 25010000002 IOPAMIDOL 61 % SOLUTION: Performed by: THORACIC SURGERY (CARDIOTHORACIC VASCULAR SURGERY)

## 2020-09-16 RX ADMIN — IOPAMIDOL 100 ML: 612 INJECTION, SOLUTION INTRAVENOUS at 14:33

## 2021-06-22 ENCOUNTER — TELEPHONE (OUTPATIENT)
Dept: ONCOLOGY | Facility: CLINIC | Age: 81
End: 2021-06-22

## 2021-06-22 NOTE — TELEPHONE ENCOUNTER
Caller: Dorota Ferrer    Relationship to patient: Self    Best call back number: [phone]    Type of visit: 1 YEAR FOLLOW UP - HAS A RECALL     Requested date: 07/15 OR LATER. IN THE AFTERNOON    Additional notes: PLEASE CALL ONCE SCHEDULED. HUB UNABLE TO SCHEDULE WITHIN TIMEFRAME.       DISCHARGE SUMMARY    Patient ID:   Anna Buttefrield  [id number]; 84 year old 1935    Admit date: 7/30/2020    Discharge date:  7/31/2020    PCP: Oneil Miller PA-C     DISCHARGE DIAGNOSIS:  1.Chest pain-  2.Afib with RVR  3.CKD3-  4.HPL-  5.HTN-   6.h/o Sjogren's   7.GERD    OTHER COMORBIDITIES:  Active Hospital Problems    Diagnosis   • Atrial fibrillation with RVR (CMS/HCC)   • CKD (chronic kidney disease), stage III (CMS/HCC)   • Sjogren's syndrome (CMS/HCC)   • Essential hypertension   • GERD (gastroesophageal reflux disease)   • Coronary artery disease     Class: Moderate     Myocardial Perfusion Scan 8/20/15  Impression: 1. Low risk myocardial perfusion study 2. Fixed anterior wall small defect likely secondary to soft tissue attenuation. 3. Overall LV systolic function is normal with an ejection fraction of 62% and no wall motion abnormalities.    Cath 8/21/14:  1: Severe disease in the mid right coronary artery successfully treated with a Xience Xpedition 2.75 x 18 mm drug-eluting stent. This corresponds to ischemia seen on stress test read by Dr. Kirkpatrick.   2. Moderate disease in the proximal left anterior descending coronary artery as well as mid circumflex coronary artery with 45-50% stenoses there.   3: Normal left ventricular systolic function ejection fraction of 55-60%.    Echo  4/19/12:  In summary, well preserved left ventricular systolic function with stage 1 diastolic dysfunction of the left ventricle.   Trivial pulmonic and mild tricuspid valve insufficiencies with no evidence of pulmonary hypertension is seen.   There is no etiology seen for the patient's dizziness here.     • Hyperlipidemia, mixed       PROCEDURES PERFORMED DURING THIS STAY:  Images:  Ct Angiogram Chest Abdomen Pelvis    Result Date: 7/30/2020  EXAM: CT ANGIO CHEST ABDOMEN PELVIS W WO CONTRAST INDICATION: 84-year-old female with a history of atrial fibrillation and coronary artery disease. Acute onset of chest pain extending  into the right jaw. Pain is predominantly epigastric. Lightheadedness. There is concern of possible dissection. COMPARISON: CT abdomen pelvis from 10/10/2017. CT angiogram of the chest for pulmonary embolus on 2/13/2020. TECHNIQUE: CT angiography of the chest, abdomen, and pelvis was performed during well tolerated bolus administration of 100 mL Isovue-370  contrast material.  Initially, noncontrast images were performed.  This was subsequently followed by the post contrast CTA images. 2D reformatted images were created and saved to PACS.  The study was interpreted at a dedicated 3D workstation where additional post-processing was performed, including MIP and volume rendered images.  Selected post-processed images were also saved to PACS. This CT exam was performed using one or more of the following dose reduction techniques: Automated exposure control, adjustment of the mA and/or KV according to patient size, and/or use of iterative reconstructive technique. FINDINGS: There is no evidence of thoracic or abdominal aortic aneurysm, dissection, or acute vascular injury. There is a patent right brachiocephalic artery, left common carotid artery, left vertebral artery, and left subclavian arteries from the thoracic aortic arch. The left vertebral artery arising directly from the thoracic aortic arch is an anatomic variant. These vessels are widely patent. The celiac artery, superior mesenteric artery, and inferior mesenteric arteries and their branches are patent. There are single bilateral renal arteries which also remain widely patent. Atherosclerotic calcification is identified within the infrarenal abdominal aorta and iliac vessels. However there is no significant focal stenosis. Common femoral arteries, proximal profunda femoral arteries, and proximal superficial femoral arteries are also patent. The study was not optimized for evaluation of the central pulmonary arteries. No central pulmonary embolus is noted.  Central pulmonary arteries are prominent in size which can be seen with pulmonary hypertension. Heart size is normal. Coronary artery calcification is noted. There is mild prominence of the left atrium. There is no mediastinal, axillary, or hilar adenopathy. Scattered cystic changes or blebs within the lung are noted. Calcified pleural plaque disease is noted bilaterally suggesting prior asbestos exposure. There is a 6 mm partially calcified nodule abutting the pleura in the right middle lobe (image 189 of series 3) which is stable from previous study of 2/13/2020. This may represent a focal calcified pleural plaque. There are scattered scattered calcified granulomas. Area of bandlike volume loss in the right middle lobe posteriorly is new from the patient's previous study and is likely an area of atelectasis. Mild peribronchial thickening is seen diffusely but especially in the right middle lobe and lower lobes and may be related to reactive airway disease or bronchitis. A 3 mm nodular density with central cavitation is seen in the subpleural right lower lobe and, in retrospect, is likely stable from the previous study. There is improved aeration compared to the previous study when bibasilar atelectasis was noted. Minimal bandlike scarring in the lingula is stable as well as within the left lower lobe. There is no acute focal consolidation, pleural effusion, or pneumothorax. The central airways are patent. There is no acute fracture within the chest. The liver, gallbladder, spleen, accessory splenule, pancreas, and adrenal glands are unremarkable. The kidneys are unremarkable without nephrolithiasis, hydronephrosis, or renal mass. The urinary bladder is moderately distended. The uterus and adnexal regions are unremarkable. There is diverticulosis without diverticulitis. There are no localized inflammatory changes of the bowel. There is no bowel obstruction, abscess, or free intraperitoneal air. The appendix is  normal. There is a moderate amount stool diffusely throughout the colon suggesting constipation. There is a fat-containing umbilical hernia. There are significant degenerative changes of both hip joints with multilevel degenerative changes within the lumbar spine. This includes minimal anterolisthesis of L4 on L5 and severe facet degenerative changes in the lower 3 lumbar levels. No acute fracture is noted.     IMPRESSION: 1. No evidence of thoracic or abdominal aortic aneurysm or dissection. No acute vascular injury is identified. 2. Although the study was not optimized for evaluation of pulmonary arteries, no central pulmonary embolus is noted. 3. Bilateral calcified pleural plaque disease suggesting prior asbestos exposure. Additionally there are scattered calcified granulomas. Cystic spaces within the lungs are also noted suggesting emphysematous lung disease with enlarged central pulmonary arteries suggesting a component of pulmonary hypertension. These findings are stable. 4. Mild peribronchial thickening with an area of likely mild mucous plugging and atelectasis in the right middle lobe. This may represent reactive airway disease or bronchitis. There is no other focal consolidation. 4. Diverticulosis without diverticulitis. No localized inflammatory changes of the bowel are seen. 5. Moderate distention of the urinary bladder without bladder wall thickening, stone, or mass. There is no hydronephrosis. 6. Moderate amount stool diffusely throughout the colon possibly indicating constipation. No localized inflammatory changes of the bowel are seen.       Labs  Recent Labs   Lab 07/31/20  0635 07/30/20  0427   WBC 3.0* 4.2   HGB 12.9 12.1   HCT 39.2 37.4    179   RBC 4.41 4.15   MCV 88.9 90.1   MCH 29.3 29.2   MCHC 32.9 32.4     Recent Labs   Lab 07/31/20  0635 07/30/20  0427   SODIUM 142 138   CHLORIDE 107 104   BUN 15 23*   GLUCOSE 88 96   POTASSIUM 3.8 4.3   CO2 23 29   CALCIUM 8.2* 8.3*     Recent Labs    Lab 07/30/20  0427   BILIRUBIN 0.5   AST 21   ALBUMIN 3.2*   ALKPT 73   GPT 21       HISTORY OF PRESENT ILLNESS:  Anna Butterfield is a 84 year old female who is being admitted to the Hospitalist service for chest pain rule out ACS and also AFib with RVR status post electrical cardioversion.      Patient is an 84-year-old woman with history of AFib on Eliquis, hypertension, coronary disease in the past status post stent placement, hyperlipidemia, and other medical comorbidities as listed in epic.     She woke up from her sleep early this morning with complaints of chest tightness located on the left side of the chest radiating to her jaw was which was progressively getting worse.  She also felt her heart rate has increased and also short of breath otherwise he denied palpitation, PND, orthopnea, diaphoresis, nausea, vomiting or any other UR UTI symptoms.     Initially she thought this could be because of her GERD symptoms at baseline but was also concerned for possible heart attack.     At the ER patient was noted to have the following vital signs blood pressure 107/76, heart rate 125, respiratory rate of 17, saturating 98% on room air.     Initial troponin were negative and CT angio done rule out any PE or any dissecting aneurysm.  EKG shows AFib with RVR at a rate of 119 for which patient received metoprolol and Cardizem.     She underwent x2 electrical cardioversion which converted back to sinus rhythm but with bradycardia most likely drug-induced.     She has some remain asymptomatic otherwise with low blood pressure and bradycardia and hence patient was transferred to the medical floor to be observe overnight and to trend troponin.  All other labs for however unremarkable.       Overnight course  Patient was observed overnight and she has remained without any chest pain and had telemetry has remained in normal sinus rhythm with improved heart rate in the mid 60's  She is currently stable and being discharged  home to follow up with her primary care.  Her metoprolol has been changed from 75 mg daily to 25 mg daily because of bradycardia and her amlodipine has been increased from 2.5 mg to 10 mg daily because of a control hypertension.      PATIENTS CURRENT VITALS & PHYSICAL EXAM  Visit Vitals  BP (!) 171/85 (BP Location: LUE - Left upper extremity, Patient Position: Semi-Loomis's)   Pulse 64   Temp 97.7 °F (36.5 °C) (Oral)   Resp 16   Ht 5' 8\" (1.727 m)   Wt 91.6 kg   SpO2 99%   BMI 30.71 kg/m²     General:  Alert and Oriented to person, place and time.  Patient is in no acute distress.      HEENT:  Normocephalic, atraumatic.   Sclerae are anicteric.  Oropharynx is clear without erythema, mucous membranes are dry.  Neck is soft and supple.  Jugular venous pressure is not elevated.   Cardiovascular:  Regular rate and rhythm, normal S1 and S2, no  murmurs or rubs.  Pulmonary:  Lungs are clear to auscultation bilaterally.  No wheezes, rales or rhonchi.  Patient is not using accessory muscles to breath.  Abdomen:  Soft, non-tender and non-distended.  Bowel sounds are normoactive.  No guarding or rebound tenderness.  No Hepatosplenomegaly.  No masses or bruits.  Extremities:  +1 edema in the lower extremities bilaterally.  2+ dorsalis pedis pulses bilaterally.  Warm and well perfused.     Skin:  No rashes or erythema.  No cyanosis or clubbing.  Neurologic:   alert, awake, well oriented time place and person and situation and moves all extremities.    Allergies:  ALLERGIES:  Ciprofloxacin; Macrobid [nitrofurantoin monohydrate macrocrystals]; Sulfa drugs cross reactors; Cephalexin; Erythromycin; Nitrofurantoin; and Tramadol    Discharge Medications:     Summary of your Discharge Medications      Take these Medications      Details   acetaminophen 500 MG tablet  Commonly known as:  TYLENOL   Take 1,000 mg by mouth every 4 hours as needed for Pain.     amLODIPine 5 MG tablet  Commonly known as:  NORVASC   Take 2 tablets by mouth  daily.     aspirin 81 MG chewable tablet   Chew 81 mg by mouth daily.     atorvastatin 20 MG tablet  Commonly known as:  LIPITOR   Take 1 tablet by mouth daily.     BIOTIN PO   Take 1 tablet by mouth daily.     Cholecalciferol 50 mcg (2,000 units) capsule   Take 1 capsule by mouth daily.     COQ10 PO   Take 1 tablet by mouth daily.     diazePAM 5 MG tablet  Commonly known as:  VALIUM   Take 2.5-5 mg by mouth nightly as needed for Anxiety.     DRY EYE RELIEF DROPS OP   Place 1-2 drops into both eyes daily.     Eliquis 5 MG Tab   Generic drug:  apixaBAN  Take 1 tablet by mouth every 12 hours.     FIBER PO   Take 1 capsule by mouth daily.     FISH OIL PO   Take 2 capsules by mouth daily.     fluticasone 50 MCG/ACT nasal spray  Commonly known as:  FLONASE   Spray 2 sprays in each nostril daily as needed (Congestion).     glycerin (adult) 2 g suppository   Place 1 suppository rectally daily as needed for Constipation.     hydroCORTisone 2.5 % rectal cream  Commonly known as:  Proctozone-HC   Place rectally 2 times daily. Apply BID to affected area     hydroCORTisone 25 MG suppository  Commonly known as:  ANUSOL-HC   Place 1 suppository rectally 2 times daily as needed for Hemorrhoids.     hydroxychloroquine 200 MG tablet  Commonly known as:  PLAQUENIL   Take 200 mg by mouth daily.     metoPROLOL succinate 25 MG 24 hr tablet  Commonly known as:  TOPROL-XL   Take 1 tablet by mouth daily.     nitroGLYcerin 0.4 MG sublingual tablet  Commonly known as:  NITROSTAT   Place 1 tablet under the tongue every 5 minutes as needed for Chest pain.     nystatin 210806 UNIT/GM cream  Commonly known as:  MYCOSTATIN   Apply to stomach twice daily     pantoprazole 40 MG tablet  Commonly known as:  PROTONIX   Take 1 tablet by mouth daily.     polyethylene glycol 17 GM/SCOOP powder  Commonly known as:  MiraLax   Take 17 g by mouth daily as needed (constipation).     PROBIOTIC DAILY PO   Take 1 tablet by mouth daily.     TUMS PO   Take 1-2  chewable tablets by mouth every 4 hours as needed for heartburn and stomach upset     vitamin - therapeutic multivitamin capsule   Take 1 capsule by mouth daily.            Patient Discharge Instructions:   1. Activity: activity as tolerated  2. Diet: resume prior diet  3. Wound Care: See provided instructions.    Follow-up:  Oneil Miller PA-C  [address] [address]  [phone]              Schedule an appointment as soon as possible for a visit      Oneil Miller PA-C  [address] [address]  [phone]    In 1 week    More than 30 minutes spent on discharging patient    Code Status:     Full Resuscitation    Signed:  Ramona Coello MD  7/31/2020  1:26 PM

## 2021-07-20 ENCOUNTER — APPOINTMENT (OUTPATIENT)
Dept: WOMENS IMAGING | Facility: HOSPITAL | Age: 81
End: 2021-07-20

## 2021-07-20 PROCEDURE — 77063 BREAST TOMOSYNTHESIS BI: CPT | Performed by: RADIOLOGY

## 2021-07-20 PROCEDURE — 77067 SCR MAMMO BI INCL CAD: CPT | Performed by: RADIOLOGY

## 2021-07-27 RX ORDER — ANASTROZOLE 1 MG/1
1 TABLET ORAL DAILY
Qty: 90 TABLET | Refills: 3 | Status: SHIPPED | OUTPATIENT
Start: 2021-07-27 | End: 2021-09-02

## 2021-07-27 NOTE — TELEPHONE ENCOUNTER
Anastrozole refill request rec electronically from [facility]. Per last office note from Dr Agustin-pt is to continue for one more year.    Continue Arimidex and Xarelto  2.   Based on most recent DEXA scan report showing improved bone mineral density and her excellent tolerance,  we will have the patient continue on Arimidex for at least 1 more year (7 yrs total)).    Request approved and submitted to [facility]

## 2021-08-27 RX ORDER — LEVOTHYROXINE SODIUM 88 UG/1
TABLET ORAL
Qty: 30 TABLET | OUTPATIENT
Start: 2021-08-27

## 2021-08-27 NOTE — TELEPHONE ENCOUNTER
Levothyroxine refill request rec electronically from [facility]. This is not mentioned in the last note from Dr Agustin. It is not in pts medication list (active OR inactive). I do not see this in any dispense history in Epic. Request denied.

## 2021-08-30 DIAGNOSIS — Z17.0 MALIGNANT NEOPLASM OF CENTRAL PORTION OF LEFT BREAST IN FEMALE, ESTROGEN RECEPTOR POSITIVE (HCC): Primary | ICD-10-CM

## 2021-08-30 DIAGNOSIS — C50.112 MALIGNANT NEOPLASM OF CENTRAL PORTION OF LEFT BREAST IN FEMALE, ESTROGEN RECEPTOR POSITIVE (HCC): Primary | ICD-10-CM

## 2021-09-02 ENCOUNTER — LAB (OUTPATIENT)
Dept: LAB | Facility: HOSPITAL | Age: 81
End: 2021-09-02

## 2021-09-02 ENCOUNTER — OFFICE VISIT (OUTPATIENT)
Dept: ONCOLOGY | Facility: CLINIC | Age: 81
End: 2021-09-02

## 2021-09-02 VITALS
RESPIRATION RATE: 16 BRPM | SYSTOLIC BLOOD PRESSURE: 168 MMHG | TEMPERATURE: 96.9 F | BODY MASS INDEX: 40.35 KG/M2 | HEART RATE: 78 BPM | HEIGHT: 65 IN | OXYGEN SATURATION: 96 % | DIASTOLIC BLOOD PRESSURE: 83 MMHG | WEIGHT: 242.2 LBS

## 2021-09-02 DIAGNOSIS — Z17.0 MALIGNANT NEOPLASM OF CENTRAL PORTION OF LEFT BREAST IN FEMALE, ESTROGEN RECEPTOR POSITIVE (HCC): Primary | ICD-10-CM

## 2021-09-02 DIAGNOSIS — Z17.0 MALIGNANT NEOPLASM OF CENTRAL PORTION OF LEFT BREAST IN FEMALE, ESTROGEN RECEPTOR POSITIVE (HCC): ICD-10-CM

## 2021-09-02 DIAGNOSIS — C50.112 MALIGNANT NEOPLASM OF CENTRAL PORTION OF LEFT BREAST IN FEMALE, ESTROGEN RECEPTOR POSITIVE (HCC): Primary | ICD-10-CM

## 2021-09-02 DIAGNOSIS — C50.112 MALIGNANT NEOPLASM OF CENTRAL PORTION OF LEFT BREAST IN FEMALE, ESTROGEN RECEPTOR POSITIVE (HCC): ICD-10-CM

## 2021-09-02 DIAGNOSIS — Z09 ENCOUNTER FOR FOLLOW-UP EXAMINATION AFTER COMPLETED TREATMENT FOR CONDITIONS OTHER THAN MALIGNANT NEOPLASM: ICD-10-CM

## 2021-09-02 LAB
ALBUMIN SERPL-MCNC: 4.2 G/DL (ref 3.5–5.2)
ALBUMIN/GLOB SERPL: 1.4 G/DL (ref 1.1–2.4)
ALP SERPL-CCNC: 123 U/L (ref 38–116)
ALT SERPL W P-5'-P-CCNC: 10 U/L (ref 0–33)
ANION GAP SERPL CALCULATED.3IONS-SCNC: 9.2 MMOL/L (ref 5–15)
AST SERPL-CCNC: 15 U/L (ref 0–32)
BASOPHILS # BLD AUTO: 0.06 10*3/MM3 (ref 0–0.2)
BASOPHILS NFR BLD AUTO: 0.7 % (ref 0–1.5)
BILIRUB SERPL-MCNC: 0.4 MG/DL (ref 0.2–1.2)
BUN SERPL-MCNC: 21 MG/DL (ref 6–20)
BUN/CREAT SERPL: 16.8 (ref 7.3–30)
CALCIUM SPEC-SCNC: 9.5 MG/DL (ref 8.5–10.2)
CHLORIDE SERPL-SCNC: 103 MMOL/L (ref 98–107)
CO2 SERPL-SCNC: 27.8 MMOL/L (ref 22–29)
CREAT SERPL-MCNC: 1.25 MG/DL (ref 0.6–1.1)
DEPRECATED RDW RBC AUTO: 50.7 FL (ref 37–54)
EOSINOPHIL # BLD AUTO: 0.1 10*3/MM3 (ref 0–0.4)
EOSINOPHIL NFR BLD AUTO: 1.2 % (ref 0.3–6.2)
ERYTHROCYTE [DISTWIDTH] IN BLOOD BY AUTOMATED COUNT: 14.6 % (ref 12.3–15.4)
GFR SERPL CREATININE-BSD FRML MDRD: 41 ML/MIN/1.73
GLOBULIN UR ELPH-MCNC: 3.1 GM/DL (ref 1.8–3.5)
GLUCOSE SERPL-MCNC: 81 MG/DL (ref 74–124)
HCT VFR BLD AUTO: 40.3 % (ref 34–46.6)
HGB BLD-MCNC: 12.2 G/DL (ref 12–15.9)
IMM GRANULOCYTES # BLD AUTO: 0.02 10*3/MM3 (ref 0–0.05)
IMM GRANULOCYTES NFR BLD AUTO: 0.2 % (ref 0–0.5)
LYMPHOCYTES # BLD AUTO: 2.75 10*3/MM3 (ref 0.7–3.1)
LYMPHOCYTES NFR BLD AUTO: 31.8 % (ref 19.6–45.3)
MCH RBC QN AUTO: 28.8 PG (ref 26.6–33)
MCHC RBC AUTO-ENTMCNC: 30.3 G/DL (ref 31.5–35.7)
MCV RBC AUTO: 95 FL (ref 79–97)
MONOCYTES # BLD AUTO: 0.91 10*3/MM3 (ref 0.1–0.9)
MONOCYTES NFR BLD AUTO: 10.5 % (ref 5–12)
NEUTROPHILS NFR BLD AUTO: 4.82 10*3/MM3 (ref 1.7–7)
NEUTROPHILS NFR BLD AUTO: 55.6 % (ref 42.7–76)
NRBC BLD AUTO-RTO: 0 /100 WBC (ref 0–0.2)
PLATELET # BLD AUTO: 289 10*3/MM3 (ref 140–450)
PMV BLD AUTO: 9.5 FL (ref 6–12)
POTASSIUM SERPL-SCNC: 4.2 MMOL/L (ref 3.5–4.7)
PROT SERPL-MCNC: 7.3 G/DL (ref 6.3–8)
RBC # BLD AUTO: 4.24 10*6/MM3 (ref 3.77–5.28)
SODIUM SERPL-SCNC: 140 MMOL/L (ref 134–145)
WBC # BLD AUTO: 8.66 10*3/MM3 (ref 3.4–10.8)

## 2021-09-02 PROCEDURE — 99214 OFFICE O/P EST MOD 30 MIN: CPT | Performed by: INTERNAL MEDICINE

## 2021-09-02 PROCEDURE — 80053 COMPREHEN METABOLIC PANEL: CPT

## 2021-09-02 PROCEDURE — 36415 COLL VENOUS BLD VENIPUNCTURE: CPT

## 2021-09-02 PROCEDURE — 85025 COMPLETE CBC W/AUTO DIFF WBC: CPT

## 2021-09-02 RX ORDER — ATORVASTATIN CALCIUM 80 MG/1
80 TABLET, FILM COATED ORAL DAILY
COMMUNITY
Start: 2021-04-26

## 2021-09-02 RX ORDER — LEVOTHYROXINE SODIUM 88 UG/1
88 TABLET ORAL DAILY
COMMUNITY
Start: 2021-03-18 | End: 2022-09-22

## 2021-09-02 NOTE — PROGRESS NOTES
Subjective     REASONS FOR FOLLOWUP:   1. Extensive deep vein thrombosis of the left lower extremity following knee surgery.   2. Progression of clot in spite of Coumadin therapy. The patient is not felt to be a Coumadin failure but may have been subtherapeutic.   3. Lupus inhibitor identified on thrombophilia evaluation.   4. IVC filter placed by Dr. Perry Rosario September 2012.   5. Resolution of lupus anticoagulant 12/12.   6. Persistent active clot in February 2013, switched to Xarelto lifelong.   7. An 8 mm grade 1 lobular carcinoma, on Arimidex for 5 years. Starting 7/2014  8. Firm plaque-like area in the left breast. Mammographically negative in May 2015.     History of Present Illness  Ms. Ferrer is 81-year-old lady with history of hypercoagulable state on lifelong anticoagulation and a small lobular breast cancer on the left, now on Arimidex for 7 years.    At this point we have decided to stop her Arimidex as we are only planned on 7 years of adjuvant treatment and she is happy to hear this.    She was noted to have an abnormal CT of the chest with peripheral infiltrates noted as well as questionable endobronchial lesion in the left upper lobe.  Follow-up CT scan was recommended, performed 9/17/2019.  Upon review of the chart this shows evidence of 2 tiny noncalcified pulmonary nodules in the left upper lobe, one appearing new since prior CT dated 8/5/2019.  It was recommended she undergo repeat CT scan in 6 months.  This was not done because of the coronavirus pandemic and I scheduled this last September and reviewed it and this was thankfully benign and stable    She denies any changes to her breast.  She continues with longstanding area of induration at previous lumpectomy site that is nontender and unchanged.  She denies any new pain.  She is tolerating Arimidex well with no significant side effects.    [facility]-next DEXA scan is due now  but she wants to wait till January because of the pandemic     She also continues on Xarelto for history of DVT and is tolerating this well without evidence of bleeding.    She has no other concerns today.    Active Ambulatory Problems     Diagnosis Date Noted   • Malignant neoplasm of central portion of left female breast (CMS/[facility]) 05/08/2016   • DVT (deep venous thrombosis) (CMS/[facility]) 05/08/2016   • Osteopenia of multiple sites 07/02/2019   • Sick sinus syndrome with tachycardia (CMS/[facility]) 08/03/2019   • Anemia 08/06/2019   • Atrial fibrillation (CMS/[facility]) 09/13/2019     Resolved Ambulatory Problems     Diagnosis Date Noted   • No Resolved Ambulatory Problems     Past Medical History:   Diagnosis Date   • Arthritis    • Cancer (CMS/[facility]) 2014   • Hyperlipidemia    • Hypertension    • Lung nodule    • Obesity      PAST SURGICAL HISTORY:   1. Hysterectomy.   2. Left knee replacement surgery in July 2012 with Dr. Wilfredo Arambula.     HEMATOLOGIC/ONCOLOGIC HISTORY:  The patient had undergone left total knee replacement in July.  She unfortunately developed a DVT in the left lower extremity after discontinuing her Coumadin prophylaxis.  At that point she was started on Lovenox and resumed Coumadin.  When her Lovenox s  h  ots were discontinued, she developed some more pain and swelling in the leg and was found to have had some extension of clot in the left leg and was readmitted to [facility].  She underwent IVC filter placement with Dr. Perry Rosario and was starte  d   on IV heparin initially.  Coumadin was continued and we felt that she was not a definite Coumadin failure but that she may have been subtherapeutic when the clot extended.  Her thrombophilia workup did reveal the presence of a lupus inhibitor and for thi  s reason we elected to increase her INR goal to between 2.5 and 3.5.          She also was anemic in the hospital and has been taking oral iron supplementation with some improvement in her hemoglobin.   Repeat iron studies were drawn today but are pending.  Her IN  R in the office today is slightly too high at 3.6 and she will receive a slight Coumadin adjustment dropping from 27 mg of Coumadin weekly down to 24 mg Coumadin weekly (3 mg 5 days a week and 4.5 mg 2 days a week).  Since leaving the hospital she has bee  n faithfully wearing her stocking and is trying to keep the leg elevated and feels that there has been improvement.        Patient seen 10/5/12 doing well.  Lupus anticoagulant and  to be repeated 12 weeks after her original positive lupus anticoagulant.  Her   IVC filter is temporary but I doubt we can remove it at this point.   is probably elevated because of her history of rheumatoid arthritis.        Repeat testing in 12/12 shows resolution of lupus anticoagulant.   is only 1:40 positive.  Plan to repeat her Doppler and assess and consider stopping at six months and switching to aspirin.        The patient was seen on 3/11/3 clinically stable, but repeat Doppler in February 2013 showed clot that looks active and therefore the patient was switched to Xarelto.          New breast cancer diagnosed mammographically in May 2014. Bridging by stopping her Xarelto 2 days before surgery, Lovenox 40 mg for 3 to 4 days postop and then resumption of Xarelto.      Plan and review her pathology reports in 3 weeks after surgery.              The patient was seen on 10/21/2014, having started Arimidex just 1 week ago. Tolerance so far is good.            Patient seen on 05/12/2015 with persistent induration involving the skin of the left breast  above and around the areola measuring 4 x 3 cm.  Ultrasound and mammogram ordered for followup.              Patient seen on 08/04/2015 doing well on Arimidex with thickening and induration. The plaque-like area in the left breast is unchanged with a negative mammogram and ultrasound in May.     SOCIAL HISTORY: The patient is  and lives with her  in  "[place], [place]. She is not a smoker and does not drink alcohol.   FAMILY HISTORY: No significant family history of clotting disorders.     Review of Systems   Constitutional: Negative for fatigue.   Respiratory: Negative for chest tightness and shortness of breath.    Cardiovascular: Negative for chest pain.   Gastrointestinal: Negative for constipation, diarrhea, nausea and vomiting.   Genitourinary: Negative for difficulty urinating and frequency.   Neurological: Negative for dizziness and headaches.   Psychiatric/Behavioral: Negative for sleep disturbance.      A comprehensive 14 point review of systems was performed and was negative except as mentioned.    Current Outpatient Medications on File Prior to Visit   Medication Sig Dispense Refill   • amLODIPine (NORVASC) 10 MG tablet Take 0.5 tablets by mouth Daily. 30 tablet 2   • atorvastatin (LIPITOR) 80 MG tablet Take 80 mg by mouth Daily.     • Calcium Carb-Cholecalciferol 600-200 MG-UNIT tablet Take  by mouth daily.     • ezetimibe (ZETIA) 10 MG tablet Take 10 mg by mouth Daily.     • Krill Oil 1000 MG capsule Take  by mouth.     • levothyroxine (SYNTHROID, LEVOTHROID) 88 MCG tablet Take 88 mcg by mouth Daily.     • metoprolol tartrate (LOPRESSOR) 25 MG tablet Take 1 tablet by mouth Every 12 (Twelve) Hours. 60 tablet 2   • rivaroxaban (XARELTO) 20 MG tablet Take  by mouth.     • [DISCONTINUED] anastrozole (ARIMIDEX) 1 MG tablet TAKE 1 TABLET BY MOUTH DAILY 90 tablet 3   • [DISCONTINUED] atorvastatin (LIPITOR) 40 MG tablet Take 80 mg by mouth.       No current facility-administered medications on file prior to visit.         ALLERGIES:    Allergies   Allergen Reactions   • No Known Drug Allergy Unknown - Low Severity       Objective      Vitals:    09/02/21 1535   BP: 168/83  Comment: w/b/p med taken 9am   Pulse: 78   Resp: 16   Temp: 96.9 °F (36.1 °C)   TempSrc: Skin   SpO2: 96%   Weight: 110 kg (242 lb 3.2 oz)   Height: 165.1 cm (65\")   PainSc: 0-No pain "     Current Status 9/2/2021   ECOG score 0       Physical Exam   Pulmonary/Chest:           GENERAL:  Well-developed, well-nourished in no acute distress.   SKIN:  Warm, dry without rashes, purpura or petechiae.  HEAD:  Normocephalic.  EYES:  Pupils equal, round and reactive to light.  EOMs intact.  Conjunctivae normal.  EARS:  Hearing intact.  NOSE:  Septum midline.  No excoriations or nasal discharge.  MOUTH:  Tongue is well-papillated; no stomatitis or ulcers.  Lips normal.  THROAT:  Oropharynx without lesions or exudates.  NECK:  Supple with good range of motion; no thyromegaly or masses, no JVD.  LYMPHATICS:  No cervical, supraclavicular, axillary or inguinal adenopathy.  CHEST:  Lungs clear to auscultation bilaterally. Good airflow.  BREASTS: Right breast is benign; left breast shows a  thickened area above the areola that is unchanged, measuring roughly 3 x 4 cm.  No erythema or tenderness to palpation.  CARDIAC:  Regular rate and rhythm without murmurs, rubs or gallops. Normal S1,S2.  ABDOMEN:  Soft, nontender with no organomegaly or masses.  EXTREMITIES:  No clubbing, cyanosis or edema.  NEUROLOGICAL:  Cranial Nerves II-XII grossly intact.  No focal neurological deficits.  PSYCHIATRIC:  Normal affect and mood.    I have reexamined the patient and the results are consistent with the previously documented exam. Michelet Agustin MD       RECENT LABS:  Results from last 7 days   Lab Units 09/02/21  1449   WBC 10*3/mm3 8.66   NEUTROS ABS 10*3/mm3 4.82   HEMOGLOBIN g/dL 12.2   HEMATOCRIT % 40.3   PLATELETS 10*3/mm3 289      Lab Results   Component Value Date    WBC 8.66 09/02/2021    HGB 12.2 09/02/2021    HCT 40.3 09/02/2021    MCV 95.0 09/02/2021     09/02/2021       Results from last 7 days   Lab Units 09/02/21  1449   SODIUM mmol/L 140   POTASSIUM mmol/L 4.2   CHLORIDE mmol/L 103   CO2 mmol/L 27.8   BUN mg/dL 21*   CREATININE mg/dL 1.25*   CALCIUM mg/dL 9.5   ALBUMIN g/dL 4.20   BILIRUBIN mg/dL 0.4    ALK PHOS U/L 123*   ALT (SGPT) U/L 10   AST (SGOT) U/L 15   GLUCOSE mg/dL 81         RADIOGRAPHIC DATA:  Mammogram 6/25/2019:  Finding 1: stable focal asymmetry in the left breast, benign-negative.  Finding 2: stable calcifications in the left breast are benign-negative    DEXA scan 6/25/2019:  T score in the left femoral neck measures -1.8, in the range of osteopenia compared to previous exam dated 5/23/2017, there is been a 9.7% increase in bone density.    Lumbar spine T score 1.9, in the range of normal.  Compared to previous exam dated 5/23/2017, there has been a 5.2% increase in bone density.    Impression: patient's bone density is in the range of osteopenia.    Above findings of both mammogram and DEXA scan reviewed with patient and her  today, 7/2/2019.    Assessment/Plan   1. Extensive deep vein thrombosis of the left lower extremity following knee surgery.   · Progression of clot in spite of Coumadin therapy. The patient is not felt to be a Coumadin failure but may have been subtherapeutic. Now on Xarelto.  · Lupus inhibitor identified on thrombophilia evaluation.   · IVC filter placed by Dr. Perry Rosario September 2012.   · Resolution of lupus anticoagulant 12/12.   · Persistent active clot in February 2013, switched to Xarelto lifelong.   · Continue xarelto life long    2. pT1bN0- 8 mm grade 1 lobular carcinoma of the breast postlumpectomy radiation, initiated Arimidex 7/2014.  Plan to stop at 7 years  · Arimidex stopped in 7/21    3 Firm plaque-like area in the left breast. Mammographically negative in May 2015.  This has long remain unchanged and is felt to be an area of scarring post lumpectomy  .  4.  Hospitalization in August 2019 for sepsis.  CT scans showing peripheral infiltrates noted as well as questionable endobronchial lesion in the left upper lobe.  Follow-up CT scan was recommended, performed 9/17/2019.  Upon review of the chart this shows evidence of 2 tiny noncalcified pulmonary  nodules in the left upper lobe, one appearing new since prior CT dated 8/5/2019  · Repeat CAT scan in 9/20 stable    Plan  1. Stop Arimidex   2.. continue  Xarelto  23   return in 1 yr with mammogram June 2022 and DEXA scan Jan 2022 9/2/2021      CC:

## 2022-07-21 ENCOUNTER — APPOINTMENT (OUTPATIENT)
Dept: WOMENS IMAGING | Facility: HOSPITAL | Age: 82
End: 2022-07-21

## 2022-07-21 PROCEDURE — 77080 DXA BONE DENSITY AXIAL: CPT | Performed by: RADIOLOGY

## 2022-07-21 PROCEDURE — 77067 SCR MAMMO BI INCL CAD: CPT | Performed by: RADIOLOGY

## 2022-07-21 PROCEDURE — 77063 BREAST TOMOSYNTHESIS BI: CPT | Performed by: RADIOLOGY

## 2022-09-08 ENCOUNTER — APPOINTMENT (OUTPATIENT)
Dept: LAB | Facility: HOSPITAL | Age: 82
End: 2022-09-08

## 2022-09-22 ENCOUNTER — OFFICE VISIT (OUTPATIENT)
Dept: ONCOLOGY | Facility: CLINIC | Age: 82
End: 2022-09-22

## 2022-09-22 ENCOUNTER — LAB (OUTPATIENT)
Dept: LAB | Facility: HOSPITAL | Age: 82
End: 2022-09-22

## 2022-09-22 VITALS
WEIGHT: 242 LBS | TEMPERATURE: 96.7 F | BODY MASS INDEX: 40.32 KG/M2 | HEIGHT: 65 IN | OXYGEN SATURATION: 93 % | DIASTOLIC BLOOD PRESSURE: 70 MMHG | HEART RATE: 76 BPM | SYSTOLIC BLOOD PRESSURE: 189 MMHG | RESPIRATION RATE: 12 BRPM

## 2022-09-22 DIAGNOSIS — I82.402 DEEP VEIN THROMBOSIS (DVT) OF LEFT LOWER EXTREMITY, UNSPECIFIED CHRONICITY, UNSPECIFIED VEIN: ICD-10-CM

## 2022-09-22 DIAGNOSIS — C50.112 MALIGNANT NEOPLASM OF CENTRAL PORTION OF LEFT BREAST IN FEMALE, ESTROGEN RECEPTOR POSITIVE: ICD-10-CM

## 2022-09-22 DIAGNOSIS — C50.112 MALIGNANT NEOPLASM OF CENTRAL PORTION OF LEFT BREAST IN FEMALE, ESTROGEN RECEPTOR POSITIVE: Primary | ICD-10-CM

## 2022-09-22 DIAGNOSIS — Z79.899 LONG-TERM USE OF HIGH-RISK MEDICATION: ICD-10-CM

## 2022-09-22 DIAGNOSIS — Z17.0 MALIGNANT NEOPLASM OF CENTRAL PORTION OF LEFT BREAST IN FEMALE, ESTROGEN RECEPTOR POSITIVE: ICD-10-CM

## 2022-09-22 DIAGNOSIS — Z17.0 MALIGNANT NEOPLASM OF CENTRAL PORTION OF LEFT BREAST IN FEMALE, ESTROGEN RECEPTOR POSITIVE: Primary | ICD-10-CM

## 2022-09-22 LAB
ALBUMIN SERPL-MCNC: 4.2 G/DL (ref 3.5–5.2)
ALBUMIN/GLOB SERPL: 1.3 G/DL (ref 1.1–2.4)
ALP SERPL-CCNC: 147 U/L (ref 38–116)
ALT SERPL W P-5'-P-CCNC: 12 U/L (ref 0–33)
ANION GAP SERPL CALCULATED.3IONS-SCNC: 12.9 MMOL/L (ref 5–15)
AST SERPL-CCNC: 16 U/L (ref 0–32)
BASOPHILS # BLD AUTO: 0.06 10*3/MM3 (ref 0–0.2)
BASOPHILS NFR BLD AUTO: 0.8 % (ref 0–1.5)
BILIRUB SERPL-MCNC: 0.4 MG/DL (ref 0.2–1.2)
BUN SERPL-MCNC: 21 MG/DL (ref 6–20)
BUN/CREAT SERPL: 16.9 (ref 7.3–30)
CALCIUM SPEC-SCNC: 9.6 MG/DL (ref 8.5–10.2)
CHLORIDE SERPL-SCNC: 101 MMOL/L (ref 98–107)
CO2 SERPL-SCNC: 24.1 MMOL/L (ref 22–29)
CREAT SERPL-MCNC: 1.24 MG/DL (ref 0.6–1.1)
DEPRECATED RDW RBC AUTO: 49 FL (ref 37–54)
EGFRCR SERPLBLD CKD-EPI 2021: 44.1 ML/MIN/1.73
EOSINOPHIL # BLD AUTO: 0.13 10*3/MM3 (ref 0–0.4)
EOSINOPHIL NFR BLD AUTO: 1.7 % (ref 0.3–6.2)
ERYTHROCYTE [DISTWIDTH] IN BLOOD BY AUTOMATED COUNT: 14.1 % (ref 12.3–15.4)
GLOBULIN UR ELPH-MCNC: 3.2 GM/DL (ref 1.8–3.5)
GLUCOSE SERPL-MCNC: 104 MG/DL (ref 74–124)
HCT VFR BLD AUTO: 39.2 % (ref 34–46.6)
HGB BLD-MCNC: 12.1 G/DL (ref 12–15.9)
IMM GRANULOCYTES # BLD AUTO: 0.01 10*3/MM3 (ref 0–0.05)
IMM GRANULOCYTES NFR BLD AUTO: 0.1 % (ref 0–0.5)
LYMPHOCYTES # BLD AUTO: 2.34 10*3/MM3 (ref 0.7–3.1)
LYMPHOCYTES NFR BLD AUTO: 30.9 % (ref 19.6–45.3)
MCH RBC QN AUTO: 29.2 PG (ref 26.6–33)
MCHC RBC AUTO-ENTMCNC: 30.9 G/DL (ref 31.5–35.7)
MCV RBC AUTO: 94.5 FL (ref 79–97)
MONOCYTES # BLD AUTO: 0.69 10*3/MM3 (ref 0.1–0.9)
MONOCYTES NFR BLD AUTO: 9.1 % (ref 5–12)
NEUTROPHILS NFR BLD AUTO: 4.34 10*3/MM3 (ref 1.7–7)
NEUTROPHILS NFR BLD AUTO: 57.4 % (ref 42.7–76)
NRBC BLD AUTO-RTO: 0 /100 WBC (ref 0–0.2)
PLATELET # BLD AUTO: 295 10*3/MM3 (ref 140–450)
PMV BLD AUTO: 9.2 FL (ref 6–12)
POTASSIUM SERPL-SCNC: 4.4 MMOL/L (ref 3.5–4.7)
PROT SERPL-MCNC: 7.4 G/DL (ref 6.3–8)
RBC # BLD AUTO: 4.15 10*6/MM3 (ref 3.77–5.28)
SODIUM SERPL-SCNC: 138 MMOL/L (ref 134–145)
WBC NRBC COR # BLD: 7.57 10*3/MM3 (ref 3.4–10.8)

## 2022-09-22 PROCEDURE — 80053 COMPREHEN METABOLIC PANEL: CPT

## 2022-09-22 PROCEDURE — 36415 COLL VENOUS BLD VENIPUNCTURE: CPT

## 2022-09-22 PROCEDURE — 99214 OFFICE O/P EST MOD 30 MIN: CPT | Performed by: INTERNAL MEDICINE

## 2022-09-22 PROCEDURE — 85025 COMPLETE CBC W/AUTO DIFF WBC: CPT

## 2022-09-22 RX ORDER — AMLODIPINE BESYLATE 5 MG/1
1 TABLET ORAL DAILY
COMMUNITY

## 2022-09-22 RX ORDER — LEVOTHYROXINE SODIUM 0.05 MG/1
1 TABLET ORAL DAILY
COMMUNITY

## 2022-09-22 RX ORDER — ANASTROZOLE 1 MG/1
1 TABLET ORAL DAILY
COMMUNITY

## 2022-09-22 NOTE — PROGRESS NOTES
Subjective     REASONS FOR FOLLOWUP:   1. Extensive deep vein thrombosis of the left lower extremity following knee surgery.   2. Progression of clot in spite of Coumadin therapy. The patient is not felt to be a Coumadin failure but may have been subtherapeutic.   3. Lupus inhibitor identified on thrombophilia evaluation.   4. IVC filter placed by Dr. Perry Rosario September 2012.   5. Resolution of lupus anticoagulant 12/12.   6. Persistent active clot in February 2013, switched to Xarelto lifelong.   7. An 8 mm grade 1 lobular carcinoma, on Arimidex for 5 years. Starting 7/2014  8. Firm plaque-like area in the left breast. Mammographically negative in May 2015.     History of Present Illness  Ms. Ferrer is 82-year-old lady with history of hypercoagulable state on lifelong anticoagulation and a small lobular breast cancer on the left,on Arimidex for 7 years.  She stopped this last year and doing well so far    She denies any changes to her breast.  She continues with longstanding area of induration at previous lumpectomy site that is nontender and unchanged.  She denies any new pain.     Mammogram in July was thankfully benign bone density shows fairly good bone density in her spine and femurs but her femoral neck shows  osteopenia which I do not think needs specific treatment    She also continues on Xarelto for history of DVT and is tolerating this well without evidence of bleeding.    She has no other concerns today.    Active Ambulatory Problems     Diagnosis Date Noted   • Malignant neoplasm of central portion of left female breast ([facility]) 05/08/2016   • DVT (deep venous thrombosis) ([facility]) 05/08/2016   • Osteopenia of multiple sites 07/02/2019   • Sick sinus syndrome with tachycardia ([facility]) 08/03/2019   • Anemia 08/06/2019   • Atrial fibrillation ([facility]) 09/13/2019     Resolved Ambulatory Problems     Diagnosis Date Noted   • No Resolved Ambulatory Problems     Past Medical History:   Diagnosis Date   • Arthritis     • Cancer (HCC) 2014   • Hyperlipidemia    • Hypertension    • Lung nodule    • Obesity      PAST SURGICAL HISTORY:   1. Hysterectomy.   2. Left knee replacement surgery in July 2012 with Dr. Wilfredo Arambula.     HEMATOLOGIC/ONCOLOGIC HISTORY:  The patient had undergone left total knee replacement in July.  She unfortunately developed a DVT in the left lower extremity after discontinuing her Coumadin prophylaxis.  At that point she was started on Lovenox and resumed Coumadin.  When her Lovenox s  h  ots were discontinued, she developed some more pain and swelling in the leg and was found to have had some extension of clot in the left leg and was readmitted to [facility].  She underwent IVC filter placement with Dr. Perry Rosario and was starte  d   on IV heparin initially.  Coumadin was continued and we felt that she was not a definite Coumadin failure but that she may have been subtherapeutic when the clot extended.  Her thrombophilia workup did reveal the presence of a lupus inhibitor and for thi  s reason we elected to increase her INR goal to between 2.5 and 3.5.          She also was anemic in the hospital and has been taking oral iron supplementation with some improvement in her hemoglobin.  Repeat iron studies were drawn today but are pending.  Her IN  R in the office today is slightly too high at 3.6 and she will receive a slight Coumadin adjustment dropping from 27 mg of Coumadin weekly down to 24 mg Coumadin weekly (3 mg 5 days a week and 4.5 mg 2 days a week).  Since leaving the hospital she has bee  n faithfully wearing her stocking and is trying to keep the leg elevated and feels that there has been improvement.        Patient seen 10/5/12 doing well.  Lupus anticoagulant and  to be repeated 12 weeks after her original positive lupus anticoagulant.  Her   IVC filter is temporary but I doubt we can remove it at this point.   is probably elevated because of her history of rheumatoid arthritis.         Repeat testing in 12/12 shows resolution of lupus anticoagulant.   is only 1:40 positive.  Plan to repeat her Doppler and assess and consider stopping at six months and switching to aspirin.        The patient was seen on 3/11/3 clinically stable, but repeat Doppler in February 2013 showed clot that looks active and therefore the patient was switched to Xarelto.          New breast cancer diagnosed mammographically in May 2014. Bridging by stopping her Xarelto 2 days before surgery, Lovenox 40 mg for 3 to 4 days postop and then resumption of Xarelto.      Plan and review her pathology reports in 3 weeks after surgery.              The patient was seen on 10/21/2014, having started Arimidex just 1 week ago. Tolerance so far is good.            Patient seen on 05/12/2015 with persistent induration involving the skin of the left breast  above and around the areola measuring 4 x 3 cm.  Ultrasound and mammogram ordered for followup.              Patient seen on 08/04/2015 doing well on Arimidex with thickening and induration. The plaque-like area in the left breast is unchanged with a negative mammogram and ultrasound in May.       9/21  he was noted to have an abnormal CT of the chest with peripheral infiltrates noted as well as questionable endobronchial lesion in the left upper lobe.  Follow-up CT scan was recommended, performed 9/17/2019.  Upon review of the chart this shows evidence of 2 tiny noncalcified pulmonary nodules in the left upper lobe, one appearing new since prior CT dated 8/5/2019.  It was recommended she undergo repeat CT scan in 6 months.  This was not done because of the coronavirus pandemic and I scheduled this last September and reviewed it and this was thankfully benign and stable        SOCIAL HISTORY: The patient is  and lives with her  in [place], [place]. She is not a smoker and does not drink alcohol.   FAMILY HISTORY: No significant family history of clotting  "disorders.     Review of Systems   Constitutional: Negative for fatigue.   Respiratory: Negative for chest tightness and shortness of breath.    Cardiovascular: Negative for chest pain.   Gastrointestinal: Negative for constipation, diarrhea, nausea and vomiting.   Genitourinary: Negative for difficulty urinating and frequency.   Neurological: Negative for dizziness and headaches.   Psychiatric/Behavioral: Negative for sleep disturbance.      A comprehensive 14 point review of systems was performed and was negative except as mentioned.    Current Outpatient Medications on File Prior to Visit   Medication Sig Dispense Refill   • amLODIPine (NORVASC) 5 MG tablet Take 1 tablet by mouth Daily.     • anastrozole (ARIMIDEX) 1 MG tablet Take 1 tablet by mouth Daily.     • atorvastatin (LIPITOR) 80 MG tablet Take 80 mg by mouth Daily.     • Calcium Carb-Cholecalciferol 600-200 MG-UNIT tablet Take  by mouth daily.     • ezetimibe (ZETIA) 10 MG tablet Take 10 mg by mouth Daily.     • Krill Oil 1000 MG capsule Take  by mouth.     • levothyroxine (SYNTHROID, LEVOTHROID) 50 MCG tablet Take 1 tablet by mouth Daily.     • metoprolol tartrate (LOPRESSOR) 25 MG tablet Take 1 tablet by mouth Every 12 (Twelve) Hours. 60 tablet 2   • rivaroxaban (XARELTO) 20 MG tablet Take  by mouth.     • [DISCONTINUED] amLODIPine (NORVASC) 10 MG tablet Take 0.5 tablets by mouth Daily. 30 tablet 2   • [DISCONTINUED] levothyroxine (SYNTHROID, LEVOTHROID) 88 MCG tablet Take 88 mcg by mouth Daily.       No current facility-administered medications on file prior to visit.         ALLERGIES:    Allergies   Allergen Reactions   • No Known Drug Allergy Unknown - Low Severity       Objective      Vitals:    09/22/22 1405   BP: (!) 189/70   Pulse: 76   Resp: 12   Temp: 96.7 °F (35.9 °C)   SpO2: 93%   Weight: 110 kg (242 lb)   Height: 165.1 cm (65\")   PainSc: 0-No pain     Current Status 9/22/2022   ECOG score 0       Physical Exam   Pulmonary/Chest:         "   GENERAL:  Well-developed, well-nourished in no acute distress.   SKIN:  Warm, dry without rashes, purpura or petechiae.  HEAD:  Normocephalic.  EYES:  Pupils equal, round and reactive to light.  EOMs intact.  Conjunctivae normal.  EARS:  Hearing intact.  NOSE:  Septum midline.  No excoriations or nasal discharge.  MOUTH:  Tongue is well-papillated; no stomatitis or ulcers.  Lips normal.  THROAT:  Oropharynx without lesions or exudates.  NECK:  Supple with good range of motion; no thyromegaly or masses, no JVD.  LYMPHATICS:  No cervical, supraclavicular, axillary or inguinal adenopathy.  CHEST:  Lungs clear to auscultation bilaterally. Good airflow.  BREASTS: Right breast is benign; left breast shows a  thickened area above the areola that is unchanged, measuring roughly 3 x 4 cm.  No erythema or tenderness to palpation.  CARDIAC:  Regular rate and rhythm without murmurs, rubs or gallops. Normal S1,S2.  ABDOMEN:  Soft, nontender with no organomegaly or masses.  EXTREMITIES:  No clubbing, cyanosis or edema.  NEUROLOGICAL:  Cranial Nerves II-XII grossly intact.  No focal neurological deficits.  PSYCHIATRIC:  Normal affect and mood.    I have reexamined the patient and the results are consistent with the previously documented exam. Michelet Agustin MD       RECENT LABS:  Results from last 7 days   Lab Units 09/22/22  1355   WBC 10*3/mm3 7.57   NEUTROS ABS 10*3/mm3 4.34   HEMOGLOBIN g/dL 12.1   HEMATOCRIT % 39.2   PLATELETS 10*3/mm3 295      Lab Results   Component Value Date    WBC 7.57 09/22/2022    HGB 12.1 09/22/2022    HCT 39.2 09/22/2022    MCV 94.5 09/22/2022     09/22/2022       Results from last 7 days   Lab Units 09/22/22  1355   SODIUM mmol/L 138   POTASSIUM mmol/L 4.4   CHLORIDE mmol/L 101   CO2 mmol/L 24.1   BUN mg/dL 21*   CREATININE mg/dL 1.24*   CALCIUM mg/dL 9.6   ALBUMIN g/dL 4.20   BILIRUBIN mg/dL 0.4   ALK PHOS U/L 147*   ALT (SGPT) U/L 12   AST (SGOT) U/L 16   GLUCOSE mg/dL 104          Assessment & Plan   1. Extensive deep vein thrombosis of the left lower extremity following knee surgery.   · Progression of clot in spite of Coumadin therapy. The patient is not felt to be a Coumadin failure but may have been subtherapeutic. Now on Xarelto.  · Lupus inhibitor identified on thrombophilia evaluation.   · IVC filter placed by Dr. Perry Rosario September 2012.   · Resolution of lupus anticoagulant 12/12.   · Persistent active clot in February 2013, switched to Xarelto lifelong.   · Continue xarelto life long    2. pT1bN0- 8 mm grade 1 lobular carcinoma of the breast postlumpectomy radiation, initiated Arimidex 7/2014.  Plan to stop at 7 years  · Arimidex stopped in 7/21  · ROCIO as of 9/22    3 Firm plaque-like area in the left breast. Mammographically negative in May 2015.  This has long remain unchanged and is felt to be an area of scarring post lumpectomy  .  4.  Hospitalization in August 2019 for sepsis.  CT scans showing peripheral infiltrates noted as well as questionable endobronchial lesion in the left upper lobe.  Follow-up CT scan was recommended, performed 9/17/2019.  Upon review of the chart this shows evidence of 2 tiny noncalcified pulmonary nodules in the left upper lobe, one appearing new since prior CT dated 8/5/2019  · Repeat CAT scan in 9/20 stable    Plan  1.  continue  Xarelto  23   return in 1 yr with mammogram July 2023 9/22/2022      CC:

## 2023-08-08 ENCOUNTER — APPOINTMENT (OUTPATIENT)
Dept: WOMENS IMAGING | Facility: HOSPITAL | Age: 83
End: 2023-08-08
Payer: MEDICARE

## 2023-08-08 PROCEDURE — 77067 SCR MAMMO BI INCL CAD: CPT | Performed by: RADIOLOGY

## 2023-08-08 PROCEDURE — 77063 BREAST TOMOSYNTHESIS BI: CPT | Performed by: RADIOLOGY

## 2023-09-28 ENCOUNTER — OFFICE VISIT (OUTPATIENT)
Dept: ONCOLOGY | Facility: CLINIC | Age: 83
End: 2023-09-28
Payer: MEDICARE

## 2023-09-28 ENCOUNTER — LAB (OUTPATIENT)
Dept: LAB | Facility: HOSPITAL | Age: 83
End: 2023-09-28
Payer: MEDICARE

## 2023-09-28 VITALS
DIASTOLIC BLOOD PRESSURE: 86 MMHG | SYSTOLIC BLOOD PRESSURE: 141 MMHG | WEIGHT: 246.3 LBS | TEMPERATURE: 98 F | BODY MASS INDEX: 40.99 KG/M2 | OXYGEN SATURATION: 95 % | RESPIRATION RATE: 16 BRPM | HEART RATE: 65 BPM

## 2023-09-28 DIAGNOSIS — Z17.0 MALIGNANT NEOPLASM OF CENTRAL PORTION OF LEFT BREAST IN FEMALE, ESTROGEN RECEPTOR POSITIVE: Primary | ICD-10-CM

## 2023-09-28 DIAGNOSIS — C50.112 MALIGNANT NEOPLASM OF CENTRAL PORTION OF LEFT BREAST IN FEMALE, ESTROGEN RECEPTOR POSITIVE: Primary | ICD-10-CM

## 2023-09-28 DIAGNOSIS — C50.112 MALIGNANT NEOPLASM OF CENTRAL PORTION OF LEFT BREAST IN FEMALE, ESTROGEN RECEPTOR POSITIVE: ICD-10-CM

## 2023-09-28 DIAGNOSIS — Z17.0 MALIGNANT NEOPLASM OF CENTRAL PORTION OF LEFT BREAST IN FEMALE, ESTROGEN RECEPTOR POSITIVE: ICD-10-CM

## 2023-09-28 LAB
ALBUMIN SERPL-MCNC: 3.9 G/DL (ref 3.5–5.2)
ALBUMIN/GLOB SERPL: 1.4 G/DL
ALP SERPL-CCNC: 102 U/L (ref 39–117)
ALT SERPL W P-5'-P-CCNC: 15 U/L (ref 1–33)
ANION GAP SERPL CALCULATED.3IONS-SCNC: 11.9 MMOL/L (ref 5–15)
AST SERPL-CCNC: 16 U/L (ref 1–32)
BASOPHILS # BLD AUTO: 0.07 10*3/MM3 (ref 0–0.2)
BASOPHILS NFR BLD AUTO: 0.8 % (ref 0–1.5)
BILIRUB SERPL-MCNC: 0.4 MG/DL (ref 0–1.2)
BUN SERPL-MCNC: 17 MG/DL (ref 8–23)
BUN/CREAT SERPL: 12.6 (ref 7–25)
CALCIUM SPEC-SCNC: 9 MG/DL (ref 8.6–10.5)
CHLORIDE SERPL-SCNC: 107 MMOL/L (ref 98–107)
CO2 SERPL-SCNC: 25.1 MMOL/L (ref 22–29)
CREAT SERPL-MCNC: 1.35 MG/DL (ref 0.6–1.1)
DEPRECATED RDW RBC AUTO: 49.5 FL (ref 37–54)
EGFRCR SERPLBLD CKD-EPI 2021: 39.6 ML/MIN/1.73
EOSINOPHIL # BLD AUTO: 0.11 10*3/MM3 (ref 0–0.4)
EOSINOPHIL NFR BLD AUTO: 1.3 % (ref 0.3–6.2)
ERYTHROCYTE [DISTWIDTH] IN BLOOD BY AUTOMATED COUNT: 14 % (ref 12.3–15.4)
GLOBULIN UR ELPH-MCNC: 2.7 GM/DL
GLUCOSE SERPL-MCNC: 89 MG/DL (ref 65–99)
HCT VFR BLD AUTO: 39.7 % (ref 34–46.6)
HGB BLD-MCNC: 12.3 G/DL (ref 12–15.9)
IMM GRANULOCYTES # BLD AUTO: 0.01 10*3/MM3 (ref 0–0.05)
IMM GRANULOCYTES NFR BLD AUTO: 0.1 % (ref 0–0.5)
LYMPHOCYTES # BLD AUTO: 1.93 10*3/MM3 (ref 0.7–3.1)
LYMPHOCYTES NFR BLD AUTO: 23.2 % (ref 19.6–45.3)
MCH RBC QN AUTO: 29.8 PG (ref 26.6–33)
MCHC RBC AUTO-ENTMCNC: 31 G/DL (ref 31.5–35.7)
MCV RBC AUTO: 96.1 FL (ref 79–97)
MONOCYTES # BLD AUTO: 0.8 10*3/MM3 (ref 0.1–0.9)
MONOCYTES NFR BLD AUTO: 9.6 % (ref 5–12)
NEUTROPHILS NFR BLD AUTO: 5.4 10*3/MM3 (ref 1.7–7)
NEUTROPHILS NFR BLD AUTO: 65 % (ref 42.7–76)
NRBC BLD AUTO-RTO: 0 /100 WBC (ref 0–0.2)
PLATELET # BLD AUTO: 294 10*3/MM3 (ref 140–450)
PMV BLD AUTO: 9.1 FL (ref 6–12)
POTASSIUM SERPL-SCNC: 4.5 MMOL/L (ref 3.5–5.2)
PROT SERPL-MCNC: 6.6 G/DL (ref 6–8.5)
RBC # BLD AUTO: 4.13 10*6/MM3 (ref 3.77–5.28)
SODIUM SERPL-SCNC: 144 MMOL/L (ref 136–145)
WBC NRBC COR # BLD: 8.32 10*3/MM3 (ref 3.4–10.8)

## 2023-09-28 PROCEDURE — 85025 COMPLETE CBC W/AUTO DIFF WBC: CPT

## 2023-09-28 PROCEDURE — 80053 COMPREHEN METABOLIC PANEL: CPT

## 2023-09-28 PROCEDURE — 36415 COLL VENOUS BLD VENIPUNCTURE: CPT

## 2023-09-28 RX ORDER — ALENDRONATE SODIUM 70 MG/1
70 TABLET ORAL
COMMUNITY

## 2023-09-28 NOTE — PROGRESS NOTES
Subjective     REASONS FOR FOLLOWUP:   Extensive deep vein thrombosis of the left lower extremity following knee surgery.   Progression of clot in spite of Coumadin therapy. The patient is not felt to be a Coumadin failure but may have been subtherapeutic.   Lupus inhibitor identified on thrombophilia evaluation.   IVC filter placed by Dr. Perry Rosario September 2012.   Resolution of lupus anticoagulant 12/12.   Persistent active clot in February 2013, switched to Xarelto lifelong.   An 8 mm grade 1 lobular carcinoma, on Arimidex for 5 years. Starting 7/2014  Firm plaque-like area in the left breast. Mammographically negative in May 2015.     History of Present Illness  Ms. Ferrer is 82-year-old lady with history of hypercoagulable state on lifelong anticoagulation and a small lobular breast cancer on the left,on Arimidex for 7 years.  She stopped this 2 years ago and doing well so far 9 years from diagnosis    She denies any changes to her breast.  She continues with longstanding area of induration at previous lumpectomy site that is nontender and unchanged.  She denies any new pain.     Mammogram in July was thankfully benign bone density shows fairly good bone density in her spine and femurs but her femoral neck shows  osteopenia which I do not think needs specific treatment    She also continues on Xarelto for history of DVT and is tolerating this well without evidence of bleeding.    She has no other concerns today.    Active Ambulatory Problems     Diagnosis Date Noted    Malignant neoplasm of central portion of left female breast 05/08/2016    DVT (deep venous thrombosis) 05/08/2016    Osteopenia of multiple sites 07/02/2019    Sick sinus syndrome with tachycardia 08/03/2019    Anemia 08/06/2019    Atrial fibrillation 09/13/2019    Long-term use of high-risk medication 09/22/2022     Resolved Ambulatory Problems     Diagnosis Date Noted    No Resolved Ambulatory Problems     Past Medical History:   Diagnosis  Date    Arthritis     Cancer 2014    Hyperlipidemia     Hypertension     Lung nodule     Obesity      PAST SURGICAL HISTORY:   Hysterectomy.   Left knee replacement surgery in July 2012 with Dr. Wilfredo Arambula.     HEMATOLOGIC/ONCOLOGIC HISTORY:  The patient had undergone left total knee replacement in July.  She unfortunately developed a DVT in the left lower extremity after discontinuing her Coumadin prophylaxis.  At that point she was started on Lovenox and resumed Coumadin.  When her Lovenox s  h  ots were discontinued, she developed some more pain and swelling in the leg and was found to have had some extension of clot in the left leg and was readmitted to [facility].  She underwent IVC filter placement with Dr. Perry Rosario and was starte  d   on IV heparin initially.  Coumadin was continued and we felt that she was not a definite Coumadin failure but that she may have been subtherapeutic when the clot extended.  Her thrombophilia workup did reveal the presence of a lupus inhibitor and for thi  s reason we elected to increase her INR goal to between 2.5 and 3.5.          She also was anemic in the hospital and has been taking oral iron supplementation with some improvement in her hemoglobin.  Repeat iron studies were drawn today but are pending.  Her IN  R in the office today is slightly too high at 3.6 and she will receive a slight Coumadin adjustment dropping from 27 mg of Coumadin weekly down to 24 mg Coumadin weekly (3 mg 5 days a week and 4.5 mg 2 days a week).  Since leaving the hospital she has bee  n faithfully wearing her stocking and is trying to keep the leg elevated and feels that there has been improvement.        Patient seen 10/5/12 doing well.  Lupus anticoagulant and  to be repeated 12 weeks after her original positive lupus anticoagulant.  Her   IVC filter is temporary but I doubt we can remove it at this point.   is probably elevated because of her history of rheumatoid  arthritis.        Repeat testing in 12/12 shows resolution of lupus anticoagulant.   is only 1:40 positive.  Plan to repeat her Doppler and assess and consider stopping at six months and switching to aspirin.        The patient was seen on 3/11/3 clinically stable, but repeat Doppler in February 2013 showed clot that looks active and therefore the patient was switched to Xarelto.          New breast cancer diagnosed mammographically in May 2014. Bridging by stopping her Xarelto 2 days before surgery, Lovenox 40 mg for 3 to 4 days postop and then resumption of Xarelto.      Plan and review her pathology reports in 3 weeks after surgery.              The patient was seen on 10/21/2014, having started Arimidex just 1 week ago. Tolerance so far is good.            Patient seen on 05/12/2015 with persistent induration involving the skin of the left breast  above and around the areola measuring 4 x 3 cm.  Ultrasound and mammogram ordered for followup.              Patient seen on 08/04/2015 doing well on Arimidex with thickening and induration. The plaque-like area in the left breast is unchanged with a negative mammogram and ultrasound in May.       9/21  he was noted to have an abnormal CT of the chest with peripheral infiltrates noted as well as questionable endobronchial lesion in the left upper lobe.  Follow-up CT scan was recommended, performed 9/17/2019.  Upon review of the chart this shows evidence of 2 tiny noncalcified pulmonary nodules in the left upper lobe, one appearing new since prior CT dated 8/5/2019.  It was recommended she undergo repeat CT scan in 6 months.  This was not done because of the coronavirus pandemic and I scheduled this last September and reviewed it and this was thankfully benign and stable        SOCIAL HISTORY: The patient is  and lives with her  in [place], [place]. She is not a smoker and does not drink alcohol.   FAMILY HISTORY: No significant family history of  clotting disorders.     Review of Systems   Constitutional:  Negative for fatigue.   Respiratory:  Negative for chest tightness and shortness of breath.    Cardiovascular:  Negative for chest pain.   Gastrointestinal:  Negative for constipation, diarrhea, nausea and vomiting.   Genitourinary:  Negative for difficulty urinating and frequency.   Neurological:  Negative for dizziness and headaches.   Psychiatric/Behavioral:  Negative for sleep disturbance.     A comprehensive 14 point review of systems was performed and was negative except as mentioned.    Current Outpatient Medications on File Prior to Visit   Medication Sig Dispense Refill    alendronate (FOSAMAX) 70 MG tablet Take 1 tablet by mouth Every 7 (Seven) Days.      amLODIPine (NORVASC) 5 MG tablet Take 1 tablet by mouth Daily.      anastrozole (ARIMIDEX) 1 MG tablet Take 1 tablet by mouth Daily.      atorvastatin (LIPITOR) 80 MG tablet Take 1 tablet by mouth Daily.      Calcium Carb-Cholecalciferol 600-200 MG-UNIT tablet Take  by mouth daily.      ezetimibe (ZETIA) 10 MG tablet Take 1 tablet by mouth Daily.      Krill Oil 1000 MG capsule Take  by mouth.      levothyroxine (SYNTHROID, LEVOTHROID) 50 MCG tablet Take 1 tablet by mouth Daily.      metoprolol tartrate (LOPRESSOR) 25 MG tablet Take 1 tablet by mouth Every 12 (Twelve) Hours. 60 tablet 2    rivaroxaban (XARELTO) 20 MG tablet Take  by mouth.       No current facility-administered medications on file prior to visit.         ALLERGIES:    Allergies   Allergen Reactions    No Known Drug Allergy Unknown - Low Severity       Objective      Vitals:    09/28/23 1320   BP: 141/86   Pulse: 65   Resp: 16   Temp: 98 °F (36.7 °C)   TempSrc: Temporal   SpO2: 95%   Weight: 112 kg (246 lb 4.8 oz)   PainSc: 0-No pain         9/28/2023     1:07 PM   Current Status   ECOG score 0       Physical Exam   Pulmonary/Chest:         GENERAL:  Well-developed, well-nourished in no acute distress.   SKIN:  Warm, dry without  rashes, purpura or petechiae.  HEAD:  Normocephalic.  EYES:  Pupils equal, round and reactive to light.  EOMs intact.  Conjunctivae normal.  EARS:  Hearing intact.  NOSE:  Septum midline.  No excoriations or nasal discharge.  MOUTH:  Tongue is well-papillated; no stomatitis or ulcers.  Lips normal.  THROAT:  Oropharynx without lesions or exudates.  NECK:  Supple with good range of motion; no thyromegaly or masses, no JVD.  LYMPHATICS:  No cervical, supraclavicular, axillary or inguinal adenopathy.  CHEST:  Lungs clear to auscultation bilaterally. Good airflow.  BREASTS: Right breast is benign; left breast shows a  thickened area above the areola that is unchanged, measuring roughly 3 x 4 cm.  No erythema or tenderness to palpation.  CARDIAC:  Regular rate and rhythm without murmurs, rubs or gallops. Normal S1,S2.  ABDOMEN:  Soft, nontender with no organomegaly or masses.  EXTREMITIES:  No clubbing, cyanosis or edema.  NEUROLOGICAL:  Cranial Nerves II-XII grossly intact.  No focal neurological deficits.  PSYCHIATRIC:  Normal affect and mood.    I have reexamined the patient and the results are consistent with the previously documented exam. Michelet Agustin MD       RECENT LABS:  Results from last 7 days   Lab Units 09/28/23  1314   WBC 10*3/mm3 8.32   NEUTROS ABS 10*3/mm3 5.40   HEMOGLOBIN g/dL 12.3   HEMATOCRIT % 39.7   PLATELETS 10*3/mm3 294      Lab Results   Component Value Date    WBC 8.32 09/28/2023    HGB 12.3 09/28/2023    HCT 39.7 09/28/2023    MCV 96.1 09/28/2023     09/28/2023       Results from last 7 days   Lab Units 09/28/23  1314   SODIUM mmol/L 144   POTASSIUM mmol/L 4.5   CHLORIDE mmol/L 107   CO2 mmol/L 25.1   BUN mg/dL 17   CREATININE mg/dL 1.35*   CALCIUM mg/dL 9.0   ALBUMIN g/dL 3.9   BILIRUBIN mg/dL 0.4   ALK PHOS U/L 102   ALT (SGPT) U/L 15   AST (SGOT) U/L 16   GLUCOSE mg/dL 89         Assessment & Plan   1. Extensive deep vein thrombosis of the left lower extremity following knee  surgery.   Progression of clot in spite of Coumadin therapy. The patient is not felt to be a Coumadin failure but may have been subtherapeutic. Now on Xarelto.  Lupus inhibitor identified on thrombophilia evaluation.   IVC filter placed by Dr. Perry Rosario September 2012.   Resolution of lupus anticoagulant 12/12.   Persistent active clot in February 2013, switched to Xarelto lifelong.   Continue xarelto life long    2. pT1bN0- 8 mm grade 1 lobular carcinoma of the breast postlumpectomy radiation, initiated Arimidex 7/2014.  Plan to stop at 7 years  Arimidex stopped in 7/21  ROCIO as of 9/22    3 Firm plaque-like area in the left breast. Mammographically negative in May 2015.  This has long remain unchanged and is felt to be an area of scarring post lumpectomy  .  4.  Hospitalization in August 2019 for sepsis.  CT scans showing peripheral infiltrates noted as well as questionable endobronchial lesion in the left upper lobe.  Follow-up CT scan was recommended, performed 9/17/2019.  Upon review of the chart this shows evidence of 2 tiny noncalcified pulmonary nodules in the left upper lobe, one appearing new since prior CT dated 8/5/2019  Repeat CAT scan in 9/20 stable    5.CKD3    Plan  1.  continue  Xarelto 20 mg daily  2.   return in 1 yr with mammogram July 2024    I will be her 10-year visit no further follow-up will be needed for the breast cancer I think we can let her family doctor continue her Xarelto        9/28/2023      CC:

## 2024-06-24 ENCOUNTER — TELEPHONE (OUTPATIENT)
Dept: ONCOLOGY | Facility: CLINIC | Age: 84
End: 2024-06-24
Payer: MEDICARE

## 2024-06-24 DIAGNOSIS — M85.89 OSTEOPENIA OF MULTIPLE SITES: Primary | ICD-10-CM

## 2024-06-24 DIAGNOSIS — Z79.899 LONG-TERM USE OF HIGH-RISK MEDICATION: ICD-10-CM

## 2024-06-24 NOTE — TELEPHONE ENCOUNTER
Caller: Dorota Ferrer    Relationship: Self    Best call back number: [phone]    What orders are you requesting (i.e. lab or imaging): BONE DENSITY SCAN       In what timeframe would the patient need to come in: 08/09/24     Where will you receive your lab/imaging services: [facility]     Additional notes:     ASKING IF WOULD FAX ORDER TO [facility] AND THEY WILL SET IT UP SAME TIME HAS MAMMOGRAM 08/09/24    ONCE ORDER IS IN PLEASE FAX [phone]         Colchicine Counseling:  Patient counseled regarding adverse effects including but not limited to stomach upset (nausea, vomiting, stomach pain, or diarrhea).  Patient instructed to limit alcohol consumption while taking this medication.  Colchicine may reduce blood counts especially with prolonged use.  The patient understands that monitoring of kidney function and blood counts may be required, especially at baseline. The patient verbalized understanding of the proper use and possible adverse effects of colchicine.  All of the patient's questions and concerns were addressed.

## 2024-08-09 ENCOUNTER — HOSPITAL ENCOUNTER (OUTPATIENT)
Dept: PET IMAGING | Facility: HOSPITAL | Age: 84
Discharge: HOME | End: 2024-08-09
Payer: MEDICARE

## 2024-08-09 ENCOUNTER — APPOINTMENT (OUTPATIENT)
Dept: WOMENS IMAGING | Facility: HOSPITAL | Age: 84
End: 2024-08-09
Payer: MEDICARE

## 2024-08-09 DIAGNOSIS — Z79.899 LONG-TERM USE OF HIGH-RISK MEDICATION: ICD-10-CM

## 2024-08-09 DIAGNOSIS — M85.89 OSTEOPENIA OF MULTIPLE SITES: ICD-10-CM

## 2024-08-09 PROCEDURE — 77067 SCR MAMMO BI INCL CAD: CPT | Performed by: RADIOLOGY

## 2024-08-09 PROCEDURE — 77063 BREAST TOMOSYNTHESIS BI: CPT | Performed by: RADIOLOGY

## 2024-08-09 PROCEDURE — 77080 DXA BONE DENSITY AXIAL: CPT

## 2024-10-02 ENCOUNTER — LAB (OUTPATIENT)
Dept: LAB | Facility: HOSPITAL | Age: 84
End: 2024-10-02
Payer: MEDICARE

## 2024-10-02 ENCOUNTER — OFFICE VISIT (OUTPATIENT)
Dept: ONCOLOGY | Facility: CLINIC | Age: 84
End: 2024-10-02
Payer: MEDICARE

## 2024-10-02 VITALS
HEART RATE: 67 BPM | BODY MASS INDEX: 40.48 KG/M2 | WEIGHT: 243 LBS | HEIGHT: 65 IN | SYSTOLIC BLOOD PRESSURE: 178 MMHG | DIASTOLIC BLOOD PRESSURE: 88 MMHG | OXYGEN SATURATION: 93 % | TEMPERATURE: 98.2 F

## 2024-10-02 DIAGNOSIS — M85.89 OSTEOPENIA OF MULTIPLE SITES: Primary | ICD-10-CM

## 2024-10-02 DIAGNOSIS — Z79.899 LONG-TERM USE OF HIGH-RISK MEDICATION: ICD-10-CM

## 2024-10-02 DIAGNOSIS — M85.89 OSTEOPENIA OF MULTIPLE SITES: ICD-10-CM

## 2024-10-02 LAB
ALBUMIN SERPL-MCNC: 4.1 G/DL (ref 3.5–5.2)
ALBUMIN/GLOB SERPL: 1.5 G/DL
ALP SERPL-CCNC: 102 U/L (ref 39–117)
ALT SERPL W P-5'-P-CCNC: 13 U/L (ref 1–33)
ANION GAP SERPL CALCULATED.3IONS-SCNC: 10.5 MMOL/L (ref 5–15)
AST SERPL-CCNC: 17 U/L (ref 1–32)
BASOPHILS # BLD AUTO: 0.05 10*3/MM3 (ref 0–0.2)
BASOPHILS NFR BLD AUTO: 0.6 % (ref 0–1.5)
BILIRUB SERPL-MCNC: 0.4 MG/DL (ref 0–1.2)
BUN SERPL-MCNC: 21 MG/DL (ref 8–23)
BUN/CREAT SERPL: 15.8 (ref 7–25)
CALCIUM SPEC-SCNC: 9.3 MG/DL (ref 8.6–10.5)
CHLORIDE SERPL-SCNC: 104 MMOL/L (ref 98–107)
CO2 SERPL-SCNC: 26.5 MMOL/L (ref 22–29)
CREAT SERPL-MCNC: 1.33 MG/DL (ref 0.57–1)
DEPRECATED RDW RBC AUTO: 51.9 FL (ref 37–54)
EGFRCR SERPLBLD CKD-EPI 2021: 40 ML/MIN/1.73
EOSINOPHIL # BLD AUTO: 0.08 10*3/MM3 (ref 0–0.4)
EOSINOPHIL NFR BLD AUTO: 0.9 % (ref 0.3–6.2)
ERYTHROCYTE [DISTWIDTH] IN BLOOD BY AUTOMATED COUNT: 14.5 % (ref 12.3–15.4)
GLOBULIN UR ELPH-MCNC: 2.8 GM/DL
GLUCOSE SERPL-MCNC: 122 MG/DL (ref 65–99)
HCT VFR BLD AUTO: 38.8 % (ref 34–46.6)
HGB BLD-MCNC: 12 G/DL (ref 12–15.9)
IMM GRANULOCYTES # BLD AUTO: 0.03 10*3/MM3 (ref 0–0.05)
IMM GRANULOCYTES NFR BLD AUTO: 0.4 % (ref 0–0.5)
LYMPHOCYTES # BLD AUTO: 1.87 10*3/MM3 (ref 0.7–3.1)
LYMPHOCYTES NFR BLD AUTO: 21.9 % (ref 19.6–45.3)
MCH RBC QN AUTO: 30.1 PG (ref 26.6–33)
MCHC RBC AUTO-ENTMCNC: 30.9 G/DL (ref 31.5–35.7)
MCV RBC AUTO: 97.2 FL (ref 79–97)
MONOCYTES # BLD AUTO: 0.74 10*3/MM3 (ref 0.1–0.9)
MONOCYTES NFR BLD AUTO: 8.7 % (ref 5–12)
NEUTROPHILS NFR BLD AUTO: 5.75 10*3/MM3 (ref 1.7–7)
NEUTROPHILS NFR BLD AUTO: 67.5 % (ref 42.7–76)
NRBC BLD AUTO-RTO: 0 /100 WBC (ref 0–0.2)
PLATELET # BLD AUTO: 272 10*3/MM3 (ref 140–450)
PMV BLD AUTO: 9.1 FL (ref 6–12)
POTASSIUM SERPL-SCNC: 4.6 MMOL/L (ref 3.5–5.2)
PROT SERPL-MCNC: 6.9 G/DL (ref 6–8.5)
RBC # BLD AUTO: 3.99 10*6/MM3 (ref 3.77–5.28)
SODIUM SERPL-SCNC: 141 MMOL/L (ref 136–145)
WBC NRBC COR # BLD AUTO: 8.52 10*3/MM3 (ref 3.4–10.8)

## 2024-10-02 PROCEDURE — 36415 COLL VENOUS BLD VENIPUNCTURE: CPT

## 2024-10-02 PROCEDURE — 85025 COMPLETE CBC W/AUTO DIFF WBC: CPT

## 2024-10-02 PROCEDURE — 80053 COMPREHEN METABOLIC PANEL: CPT

## 2024-10-02 RX ORDER — DABIGATRAN ETEXILATE 150 MG/1
150 CAPSULE ORAL
COMMUNITY
Start: 2024-05-24 | End: 2025-05-24

## 2024-10-02 RX ORDER — LEVOTHYROXINE SODIUM 137 UG/1
1 TABLET ORAL DAILY
COMMUNITY
Start: 2024-09-09

## 2024-10-02 RX ORDER — TRAZODONE HYDROCHLORIDE 100 MG/1
100-200 TABLET ORAL NIGHTLY
COMMUNITY

## 2024-10-02 NOTE — PROGRESS NOTES
Subjective     REASONS FOR FOLLOWUP:   Extensive deep vein thrombosis of the left lower extremity following knee surgery.   Progression of clot in spite of Coumadin therapy. The patient is not felt to be a Coumadin failure but may have been subtherapeutic.   Lupus inhibitor identified on thrombophilia evaluation.   IVC filter placed by Dr. Perry Rosario September 2012.   Resolution of lupus anticoagulant 12/12.   Persistent active clot in February 2013, switched to Xarelto lifelong.   An 8 mm grade 1 lobular carcinoma, on Arimidex for 5 years. Starting 7/2014  Firm plaque-like area in the left breast. Mammographically negative in May 2015.     History of Present Illness  Ms. Ferrer is 82-year-old lady with history of hypercoagulable state on lifelong anticoagulation and a small lobular breast cancer on the left,on Arimidex for 7 years.  She stopped this 3 years ago and doing well so far 10 years from diagnosis    She denies any changes to her breast.  She continues with longstanding area of induration at previous lumpectomy site that is nontender and unchanged.  She denies any new pain.   Mammogram in August was benign and bone density shows persistent osteopenia in the hips but normal spine density and she is on oral Fosamax through her family doctor    She was switched to Pradaxa because of cost issues by her family doctor and continues on this lifelong  She has no other concerns today.    Active Ambulatory Problems     Diagnosis Date Noted    Malignant neoplasm of central portion of left female breast 05/08/2016    DVT (deep venous thrombosis) 05/08/2016    Osteopenia of multiple sites 07/02/2019    Sick sinus syndrome with tachycardia 08/03/2019    Anemia 08/06/2019    Atrial fibrillation 09/13/2019    Long-term use of high-risk medication 09/22/2022     Resolved Ambulatory Problems     Diagnosis Date Noted    No Resolved Ambulatory Problems     Past Medical History:   Diagnosis Date    Arthritis     Cancer 2014     Hyperlipidemia     Hypertension     Lung nodule     Obesity      PAST SURGICAL HISTORY:   Hysterectomy.   Left knee replacement surgery in July 2012 with Dr. Wilfredo Arambula.     HEMATOLOGIC/ONCOLOGIC HISTORY:  The patient had undergone left total knee replacement in July.  She unfortunately developed a DVT in the left lower extremity after discontinuing her Coumadin prophylaxis.  At that point she was started on Lovenox and resumed Coumadin.  When her Lovenox s  h  ots were discontinued, she developed some more pain and swelling in the leg and was found to have had some extension of clot in the left leg and was readmitted to [facility], [place].  She underwent IVC filter placement with Dr. Perry Rosario and was starte  d   on IV heparin initially.  Coumadin was continued and we felt that she was not a definite Coumadin failure but that she may have been subtherapeutic when the clot extended.  Her thrombophilia workup did reveal the presence of a lupus inhibitor and for thi  s reason we elected to increase her INR goal to between 2.5 and 3.5.          She also was anemic in the hospital and has been taking oral iron supplementation with some improvement in her hemoglobin.  Repeat iron studies were drawn today but are pending.  Her IN  R in the office today is slightly too high at 3.6 and she will receive a slight Coumadin adjustment dropping from 27 mg of Coumadin weekly down to 24 mg Coumadin weekly (3 mg 5 days a week and 4.5 mg 2 days a week).  Since leaving the hospital she has bee  n faithfully wearing her stocking and is trying to keep the leg elevated and feels that there has been improvement.        Patient seen 10/5/12 doing well.  Lupus anticoagulant and  to be repeated 12 weeks after her original positive lupus anticoagulant.  Her   IVC filter is temporary but I doubt we can remove it at this point.   is probably elevated because of her history of rheumatoid arthritis.        Repeat testing in 12/12  shows resolution of lupus anticoagulant.   is only 1:40 positive.  Plan to repeat her Doppler and assess and consider stopping at six months and switching to aspirin.        The patient was seen on 3/11/3 clinically stable, but repeat Doppler in February 2013 showed clot that looks active and therefore the patient was switched to Xarelto.          New breast cancer diagnosed mammographically in May 2014. Bridging by stopping her Xarelto 2 days before surgery, Lovenox 40 mg for 3 to 4 days postop and then resumption of Xarelto.      Plan and review her pathology reports in 3 weeks after surgery.              The patient was seen on 10/21/2014, having started Arimidex just 1 week ago. Tolerance so far is good.            Patient seen on 05/12/2015 with persistent induration involving the skin of the left breast  above and around the areola measuring 4 x 3 cm.  Ultrasound and mammogram ordered for followup.              Patient seen on 08/04/2015 doing well on Arimidex with thickening and induration. The plaque-like area in the left breast is unchanged with a negative mammogram and ultrasound in May.       9/21  he was noted to have an abnormal CT of the chest with peripheral infiltrates noted as well as questionable endobronchial lesion in the left upper lobe.  Follow-up CT scan was recommended, performed 9/17/2019.  Upon review of the chart this shows evidence of 2 tiny noncalcified pulmonary nodules in the left upper lobe, one appearing new since prior CT dated 8/5/2019.  It was recommended she undergo repeat CT scan in 6 months.  This was not done because of the coronavirus pandemic and I scheduled this last September and reviewed it and this was thankfully benign and stable        SOCIAL HISTORY: The patient is  and lives with her  in [place], [place]. She is not a smoker and does not drink alcohol.   FAMILY HISTORY: No significant family history of clotting disorders.     Review of Systems  "  Constitutional:  Negative for fatigue.   Respiratory:  Negative for chest tightness and shortness of breath.    Cardiovascular:  Negative for chest pain.   Gastrointestinal:  Negative for constipation, diarrhea, nausea and vomiting.   Genitourinary:  Negative for difficulty urinating and frequency.   Neurological:  Negative for dizziness and headaches.   Psychiatric/Behavioral:  Negative for sleep disturbance.       A comprehensive 14 point review of systems was performed and was negative except as mentioned.    Current Outpatient Medications on File Prior to Visit   Medication Sig Dispense Refill    alendronate (FOSAMAX) 70 MG tablet Take 1 tablet by mouth Every 7 (Seven) Days.      amLODIPine (NORVASC) 5 MG tablet Take 1 tablet by mouth Daily.      anastrozole (ARIMIDEX) 1 MG tablet Take 1 tablet by mouth Daily.      atorvastatin (LIPITOR) 80 MG tablet Take 1 tablet by mouth Daily.      Calcium Carb-Cholecalciferol 600-200 MG-UNIT tablet Take  by mouth daily.      dabigatran etexilate (PRADAXA) 150 MG capsu Take 1 capsule by mouth.      ezetimibe (ZETIA) 10 MG tablet Take 1 tablet by mouth Daily.      Krill Oil 1000 MG capsule Take  by mouth.      levothyroxine (SYNTHROID, LEVOTHROID) 137 MCG tablet Take 1 tablet by mouth Daily.      levothyroxine (SYNTHROID, LEVOTHROID) 50 MCG tablet Take 1 tablet by mouth Daily.      metoprolol tartrate (LOPRESSOR) 25 MG tablet Take 1 tablet by mouth Every 12 (Twelve) Hours. 60 tablet 2    traZODone (DESYREL) 100 MG tablet Take 1-2 tablets by mouth Every Night.      [DISCONTINUED] rivaroxaban (XARELTO) 20 MG tablet Take  by mouth.       No current facility-administered medications on file prior to visit.         ALLERGIES:    Allergies   Allergen Reactions    No Known Drug Allergy Unknown - Low Severity       Objective      Vitals:    10/02/24 0903   BP: 178/88   Pulse: 67   Temp: 98.2 °F (36.8 °C)   SpO2: 93%   Weight: 110 kg (243 lb)   Height: 165.1 cm (65\")   PainSc: 0-No " pain         10/2/2024     9:02 AM   Current Status   ECOG score 0       Physical Exam   Pulmonary/Chest:           GENERAL:  Well-developed, well-nourished in no acute distress.   SKIN:  Warm, dry without rashes, purpura or petechiae.  HEAD:  Normocephalic.  EYES:  Pupils equal, round and reactive to light.  EOMs intact.  Conjunctivae normal.  EARS:  Hearing intact.  NOSE:  Septum midline.  No excoriations or nasal discharge.  MOUTH:  Tongue is well-papillated; no stomatitis or ulcers.  Lips normal.  THROAT:  Oropharynx without lesions or exudates.  NECK:  Supple with good range of motion; no thyromegaly or masses, no JVD.  LYMPHATICS:  No cervical, supraclavicular, axillary or inguinal adenopathy.  CHEST:  Lungs clear to auscultation bilaterally. Good airflow.  BREASTS: Right breast is benign; left breast shows a hard thickened area above the areola that is unchanged, measuring roughly 3 x 4 cm.  No erythema or tenderness to palpation.  CARDIAC:  Regular rate and rhythm without murmurs, rubs or gallops. Normal S1,S2.  ABDOMEN:  Soft, nontender with no organomegaly or masses.  EXTREMITIES:  No clubbing, cyanosis or edema.  NEUROLOGICAL:  Cranial Nerves II-XII grossly intact.  No focal neurological deficits.  PSYCHIATRIC:  Normal affect and mood.    I have reexamined the patient and the results are consistent with the previously documented exam. Michelet Agustin MD       RECENT LABS:  Results from last 7 days   Lab Units 10/02/24  0850   WBC 10*3/mm3 8.52   NEUTROS ABS 10*3/mm3 5.75   HEMOGLOBIN g/dL 12.0   HEMATOCRIT % 38.8   PLATELETS 10*3/mm3 272      Lab Results   Component Value Date    WBC 8.52 10/02/2024    HGB 12.0 10/02/2024    HCT 38.8 10/02/2024    MCV 97.2 (H) 10/02/2024     10/02/2024                 Assessment & Plan   1. Extensive deep vein thrombosis of the left lower extremity following knee surgery.   Progression of clot in spite of Coumadin therapy. The patient is not felt to be a  Coumadin failure but may have been subtherapeutic. Now on Xarelto.  Lupus inhibitor identified on thrombophilia evaluation.   IVC filter placed by Dr. Perry Rosario September 2012.   Resolution of lupus anticoagulant 12/12.   Persistent active clot in February 2013, switched to Xarelto lifelong.   Continue xarelto life long  Switch to Pradaxa due to cost issues    2. pT1bN0- 8 mm grade 1 lobular carcinoma of the breast postlumpectomy radiation, initiated Arimidex 7/2014.  Plan to stop at 7 years  Arimidex stopped in 7/21  ROCIO as of 9/22  ROCIO as of 9/24    3 Firm plaque-like area in the left breast. Mammographically negative in May 2015.  This has long remain unchanged and is felt to be an area of scarring post lumpectomy  .  4.  Hospitalization in August 2019 for sepsis.  CT scans showing peripheral infiltrates noted as well as questionable endobronchial lesion in the left upper lobe.  Follow-up CT scan was recommended, performed 9/17/2019.  Upon review of the chart this shows evidence of 2 tiny noncalcified pulmonary nodules in the left upper lobe, one appearing new since prior CT dated 8/5/2019  Repeat CAT scan in 9/20 stable    5.CKD3    Plan  1.  continue  pradaxa life long-family doctor will prescribe  2.    No follow up needed      10/2/2024      CC:

## 2025-08-21 ENCOUNTER — APPOINTMENT (OUTPATIENT)
Dept: WOMENS IMAGING | Facility: HOSPITAL | Age: 85
End: 2025-08-21
Payer: MEDICARE

## 2025-08-21 PROCEDURE — 77067 SCR MAMMO BI INCL CAD: CPT | Performed by: RADIOLOGY

## 2025-08-21 PROCEDURE — 77063 BREAST TOMOSYNTHESIS BI: CPT | Performed by: RADIOLOGY
